# Patient Record
Sex: FEMALE | Race: WHITE | NOT HISPANIC OR LATINO | Employment: PART TIME | URBAN - METROPOLITAN AREA
[De-identification: names, ages, dates, MRNs, and addresses within clinical notes are randomized per-mention and may not be internally consistent; named-entity substitution may affect disease eponyms.]

---

## 2018-01-10 NOTE — RESULT NOTES
Verified Results  (1) LIPID PANEL FASTING W DIRECT LDL REFLEX 06Apr2016 11:10AM Chanda Dre Order Number: [id number]      Triglyceride:         Normal              <150 mg/dl       Borderline High    150-199 mg/dl       High               200-499 mg/dl       Very High          >499 mg/dl  Cholesterol:         Desirable        <200 mg/dl      Borderline High  200-239 mg/dl      High             >239 mg/dl  HDL Cholesterol:        High    >59 mg/dL      Low     <41 mg/dL  LDL Cholesterol:        Optimal          <100 mg/dl         Near Optimal     100-129 mg/dl        Above Optimal          Borderline High   130-159 mg/dl          High              160-189 mg/dl          Very High        >189 mg/dl  LDL CALCULATED:    This screening LDL is a calculated result  It does not have the accuracy of the Direct Measured LDL in the monitoring of patients with hyperlipidemia and/or statin therapy  Direct Measure LDL (BQN550) must be ordered separately in these patients  Test Name Result Flag Reference   CHOLESTEROL 167 mg/dL     LDL CHOLESTEROL CALCULATED 84 mg/dL  0-100   TRIGLYCERIDES 120 mg/dL  <=150   Specimen collection should occur prior to N-Acetylcysteine or Metamizole administration due to the potential for falsely depressed results  HDL,DIRECT 59 mg/dL  40-60     (1) COMPREHENSIVE METABOLIC PANEL [id number] 14:53GC Pradipramesh Erickson Order Number: [id number]      National Kidney Disease Education Program recommendations are as follows:  GFR calculation is accurate only with a steady state creatinine  Chronic Kidney disease less than 60 ml/min/1 73 sq  meters  Kidney failure less than 15 ml/min/1 73 sq  meters  Test Name Result Flag Reference   GLUCOSE,RANDM 86 mg/dL     If the patient is fasting, the ADA then defines impaired fasting glucose as > 100 mg/dL and diabetes as > or equal to 123 mg/dL     SODIUM 141 mmol/L  136-145   POTASSIUM 4 1 mmol/L  3 5-5 3   CHLORIDE 106 mmol/L 100-108   CARBON DIOXIDE 29 mmol/L  21-32   ANION GAP (CALC) 6 mmol/L  4-13   BLOOD UREA NITROGEN 14 mg/dL  5-25   CREATININE 0 74 mg/dL  0 60-1 30   Standardized to IDMS reference method   CALCIUM 10 2 mg/dL H 8 3-10 1   BILI, TOTAL 1 00 mg/dL  0 20-1 00   ALK PHOSPHATAS 92 U/L     ALT (SGPT) 27 U/L  12-78   AST(SGOT) 16 U/L  5-45   ALBUMIN 4 1 g/dL  3 5-5 0   TOTAL PROTEIN 7 5 g/dL  6 4-8 2   eGFR Non-African American      >60 0 ml/min/1 73sq m

## 2018-01-12 NOTE — RESULT NOTES
Message   Pap and HPV negative  Will recheck in 5 years      Verified Results  (1) THIN PREP PAP WITH IMAGING [id number] 12:43PM Dinah Guerrero     Test Name Result Flag Reference   LAB AP CASE REPORT (Report)     Gynecologic Cytology Report            Case: [id number]                  Authorizing Provider: Carol Butler MD     Collected:      05/11/2016 1243        First Screen:     Michelle Walls, CT Received:      05/13/2016 1243        Specimen:  LIQUID-BASED PAP, SCREENING, Cervix   HPV HIGH RISK RESULT (Report)     HPV, High Risk: HPV NEG, HPV16 NEG, HPV18 NEG      Other High Risk HPV Negative, HPV 16 Negative, HPV 18 Negative  HPV types: 16,18,31,33,35,39,45,51,52,56,58,59,66 and 68 DNA are undetectable or below the pre-set threshold  [employer]?s FDA approved Rosalio 4800 is utilized with strict adherence to the ?s instruction  manual to test for the presence of High-Risk HPV DNA, as well as HPV 16 and HPV 18  This instrument  has been validated by our laboratory and/or by the   A negative result does not preclude the presence of HPV infection because results depend on adequate  specimen collection, absence of inhibitors and sufficient DNA to be detected  Additionally, HPV negative  results are not intended to prevent women from proceeding to colposcopy if clinically warranted  Positive HPV test results indicate the presence of any one or more of the high risk types, but since patients  are often co-infected with low-risk types it does not rule out the presence of low-risk types in patients  with mixed infections  LAB AP GYN PRIMARY INTERPRETATION      Negative for intraepithelial lesion or malignancy   LAB AP GYN SPECIMEN ADEQUACY      Satisfactory for evaluation  Endocervical/transformation zone component present     LAB AP GYN ADDITIONAL INFORMATION (Report)     [employer]'s FDA approved ,  and ThinPrep Imaging System are   utilized with strict adherence to the 's instruction manual to   prepare gynecologic and non-gynecologic cytology specimens for the   production of ThinPrep slides as well as for gynecologic ThinPrep imaging  These processes have been validated by our laboratory and/or by the     The Pap test is not a diagnostic procedure and should not be used as the   sole means to detect cervical cancer  It is only a screening procedure to   aid in the detection of cervical cancer and its precursors  Both   false-negative and false-positive results have been experienced  Your   patient's test result should be interpreted in this context together with   the history and clinical findings  LAB AP LMP          Plan  Screening for cervical cancer    · (1) THIN PREP PAP WITH IMAGING ; every 5 years;  Last [id number]; Status:Active

## 2018-02-16 ENCOUNTER — OFFICE VISIT (OUTPATIENT)
Dept: FAMILY MEDICINE CLINIC | Facility: CLINIC | Age: 63
End: 2018-02-16
Payer: COMMERCIAL

## 2018-02-16 VITALS
BODY MASS INDEX: 26.75 KG/M2 | SYSTOLIC BLOOD PRESSURE: 124 MMHG | WEIGHT: 151 LBS | OXYGEN SATURATION: 98 % | HEART RATE: 62 BPM | DIASTOLIC BLOOD PRESSURE: 84 MMHG | HEIGHT: 63 IN | TEMPERATURE: 98.9 F

## 2018-02-16 DIAGNOSIS — F17.200 CURRENT EVERY DAY SMOKER: ICD-10-CM

## 2018-02-16 DIAGNOSIS — Z91.09 ENVIRONMENTAL ALLERGIES: ICD-10-CM

## 2018-02-16 DIAGNOSIS — Z12.31 VISIT FOR SCREENING MAMMOGRAM: ICD-10-CM

## 2018-02-16 DIAGNOSIS — E78.00 HIGH CHOLESTEROL: Primary | ICD-10-CM

## 2018-02-16 PROCEDURE — 99214 OFFICE O/P EST MOD 30 MIN: CPT | Performed by: FAMILY MEDICINE

## 2018-02-16 RX ORDER — FLUTICASONE PROPIONATE 50 MCG
2 SPRAY, SUSPENSION (ML) NASAL DAILY
COMMUNITY
Start: 2015-03-23

## 2018-02-16 RX ORDER — LATANOPROST 50 UG/ML
SOLUTION/ DROPS OPHTHALMIC
COMMUNITY
Start: 2018-01-22 | End: 2018-07-25 | Stop reason: ALTCHOICE

## 2018-02-16 RX ORDER — ATORVASTATIN CALCIUM 20 MG/1
1 TABLET, FILM COATED ORAL DAILY
COMMUNITY
Start: 2014-08-26 | End: 2018-02-16 | Stop reason: SDUPTHER

## 2018-02-16 RX ORDER — ATORVASTATIN CALCIUM 20 MG/1
20 TABLET, FILM COATED ORAL DAILY
Qty: 90 TABLET | Refills: 1 | Status: SHIPPED | OUTPATIENT
Start: 2018-02-16 | End: 2018-09-12 | Stop reason: SDUPTHER

## 2018-02-16 NOTE — PATIENT INSTRUCTIONS

## 2018-02-16 NOTE — PROGRESS NOTES
Assessment/Plan:     Diagnoses and all orders for this visit:    High cholesterol  -     atorvastatin (LIPITOR) 20 mg tablet; Take 1 tablet (20 mg total) by mouth daily  -     Comprehensive metabolic panel; Future  -     Lipid Panel with Direct LDL reflex; Future    Current every day smoker    Visit for screening mammogram  -     Mammo screening bilateral w cad; Future    Environmental allergies    Other orders  -     Discontinue: atorvastatin (LIPITOR) 20 mg tablet; Take 1 tablet by mouth daily  -     fluticasone (FLONASE) 50 mcg/act nasal spray; 2 sprays into each nostril daily  -     latanoprost (XALATAN) 0 005 % ophthalmic solution;       Update labs  Mammogram ordered  Declined colonoscopy  Declined Lung cancer CT screening  Counseled to quit smoking  Discussed options  She is not ready  Follow up yearly, sooner if needed  Subjective:      Patient ID: Verenice Johns is a 63 y o  female  She is here to get her cholesterol checked  She ran out of her Atorvastatin 1 month ago  She has not had labs in almost 2 years  She is a smoker, she states she would like to quit but is under stress -  being treated for carcinoid tumor  She also would like to get a mammogram   Had cologuard 2 years ago per patient  Does not want colonoscopy  Allergies - stable on Flonase  Hyperlipidemia   This is a chronic problem  The current episode started more than 1 year ago  She has no history of diabetes  Pertinent negatives include no chest pain, myalgias or shortness of breath  The following portions of the patient's history were reviewed and updated as appropriate: allergies, current medications, past family history, past medical history, past social history, past surgical history and problem list     Review of Systems   Constitutional: Negative for activity change, appetite change, chills, fatigue, fever and unexpected weight change     HENT: Negative for congestion, ear discharge, ear pain, postnasal drip, sinus pressure and sore throat  Eyes: Negative for discharge and visual disturbance  Respiratory: Negative for cough, shortness of breath and wheezing  Cardiovascular: Negative for chest pain, palpitations and leg swelling  Gastrointestinal: Negative for abdominal pain, constipation, diarrhea, nausea and vomiting  Endocrine: Negative for cold intolerance, heat intolerance, polydipsia and polyuria  Genitourinary: Negative for difficulty urinating and frequency  Musculoskeletal: Negative for arthralgias, back pain, joint swelling and myalgias  Skin: Negative for rash  Neurological: Negative for dizziness, weakness, light-headedness, numbness and headaches  Hematological: Negative for adenopathy  Psychiatric/Behavioral: Negative for behavioral problems, confusion, dysphoric mood, sleep disturbance and suicidal ideas  The patient is not nervous/anxious  Objective:      /84   Pulse 62   Temp 98 9 °F (37 2 °C)   Ht 5' 2 5" (1 588 m)   Wt 68 5 kg (151 lb)   SpO2 98%   BMI 27 18 kg/m²          Physical Exam   Constitutional: She is oriented to person, place, and time  She appears well-developed and well-nourished  No distress  HENT:   Head: Normocephalic and atraumatic  Eyes: Conjunctivae are normal  Pupils are equal, round, and reactive to light  Neck: Neck supple  Cardiovascular: Normal rate, regular rhythm and normal heart sounds  Exam reveals no gallop and no friction rub  No murmur heard  Pulmonary/Chest: Effort normal and breath sounds normal  No respiratory distress  She has no wheezes  She has no rales  She exhibits no tenderness  Musculoskeletal: Normal range of motion  She exhibits no edema  Neurological: She is alert and oriented to person, place, and time  Skin: Skin is warm and dry  No rash noted  She is not diaphoretic  Psychiatric: She has a normal mood and affect   Her behavior is normal  Judgment and thought content normal  Nursing note and vitals reviewed

## 2018-04-19 ENCOUNTER — TELEPHONE (OUTPATIENT)
Dept: FAMILY MEDICINE CLINIC | Facility: CLINIC | Age: 63
End: 2018-04-19

## 2018-04-19 ENCOUNTER — APPOINTMENT (OUTPATIENT)
Dept: LAB | Facility: CLINIC | Age: 63
End: 2018-04-19
Payer: COMMERCIAL

## 2018-04-19 DIAGNOSIS — E83.52 HYPERCALCEMIA: ICD-10-CM

## 2018-04-19 DIAGNOSIS — E78.00 HIGH CHOLESTEROL: ICD-10-CM

## 2018-04-19 DIAGNOSIS — E34.9 ELEVATED CALCITONIN LEVEL: Primary | ICD-10-CM

## 2018-04-19 DIAGNOSIS — E83.52 HYPERCALCEMIA: Primary | ICD-10-CM

## 2018-04-19 DIAGNOSIS — R79.89 ELEVATED PTHRP LEVEL: ICD-10-CM

## 2018-04-19 LAB
ALBUMIN SERPL BCP-MCNC: 3.9 G/DL (ref 3.5–5)
ALP SERPL-CCNC: 94 U/L (ref 46–116)
ALT SERPL W P-5'-P-CCNC: 29 U/L (ref 12–78)
ANION GAP SERPL CALCULATED.3IONS-SCNC: 5 MMOL/L (ref 4–13)
AST SERPL W P-5'-P-CCNC: 19 U/L (ref 5–45)
BILIRUB SERPL-MCNC: 0.65 MG/DL (ref 0.2–1)
BUN SERPL-MCNC: 14 MG/DL (ref 5–25)
CALCIUM ALBUM COR SERPL-MCNC: 10.4 MG/DL (ref 8.3–10.1)
CALCIUM SERPL-MCNC: 10.3 MG/DL (ref 8.3–10.1)
CHLORIDE SERPL-SCNC: 109 MMOL/L (ref 100–108)
CHOLEST SERPL-MCNC: 154 MG/DL (ref 50–200)
CO2 SERPL-SCNC: 29 MMOL/L (ref 21–32)
CREAT SERPL-MCNC: 0.76 MG/DL (ref 0.6–1.3)
GFR SERPL CREATININE-BSD FRML MDRD: 86 ML/MIN/1.73SQ M
GLUCOSE P FAST SERPL-MCNC: 86 MG/DL (ref 65–99)
HDLC SERPL-MCNC: 65 MG/DL (ref 40–60)
LDLC SERPL CALC-MCNC: 71 MG/DL (ref 0–100)
POTASSIUM SERPL-SCNC: 4.4 MMOL/L (ref 3.5–5.3)
PROT SERPL-MCNC: 7.5 G/DL (ref 6.4–8.2)
PTH-INTACT SERPL-MCNC: 110 PG/ML (ref 18.4–80.1)
SODIUM SERPL-SCNC: 143 MMOL/L (ref 136–145)
TRIGL SERPL-MCNC: 90 MG/DL

## 2018-04-19 PROCEDURE — 80061 LIPID PANEL: CPT

## 2018-04-19 PROCEDURE — 83970 ASSAY OF PARATHORMONE: CPT

## 2018-04-19 PROCEDURE — 36415 COLL VENOUS BLD VENIPUNCTURE: CPT

## 2018-04-19 PROCEDURE — 80053 COMPREHEN METABOLIC PANEL: CPT

## 2018-07-25 ENCOUNTER — OFFICE VISIT (OUTPATIENT)
Dept: ENDOCRINOLOGY | Facility: CLINIC | Age: 63
End: 2018-07-25
Payer: COMMERCIAL

## 2018-07-25 VITALS
SYSTOLIC BLOOD PRESSURE: 116 MMHG | HEIGHT: 63 IN | DIASTOLIC BLOOD PRESSURE: 80 MMHG | BODY MASS INDEX: 27.64 KG/M2 | HEART RATE: 66 BPM | WEIGHT: 156 LBS

## 2018-07-25 DIAGNOSIS — E83.52 HYPERCALCEMIA: ICD-10-CM

## 2018-07-25 DIAGNOSIS — Z78.0 POST-MENOPAUSAL: ICD-10-CM

## 2018-07-25 DIAGNOSIS — R79.89 ELEVATED PTHRP LEVEL: ICD-10-CM

## 2018-07-25 DIAGNOSIS — E21.3 HYPERPARATHYROIDISM (HCC): Primary | ICD-10-CM

## 2018-07-25 DIAGNOSIS — F17.200 CURRENT EVERY DAY SMOKER: ICD-10-CM

## 2018-07-25 PROCEDURE — 99244 OFF/OP CNSLTJ NEW/EST MOD 40: CPT | Performed by: INTERNAL MEDICINE

## 2018-07-25 NOTE — ASSESSMENT & PLAN NOTE
Counseled on smoking sensation-discussed increased risk of bone loss, smoking as risk factor for osteopenia/osteoporosis

## 2018-07-25 NOTE — LETTER
July 25, 2018     Oneal Mccall MD  [address]  [address]    Patient: Toi Fitting   YOB: 1955   Date of Visit: 7/25/2018       Dear Dr Дмитрий Peacock: Thank you for referring Carinenelda Schuler to me for evaluation  Below are my notes for this consultation  If you have questions, please do not hesitate to call me  I look forward to following your patient along with you  Sincerely,        Josephine Hoover MD        CC: No Recipients  Josephine Hoover MD  7/25/2018  1:08 PM  Sign at close encounter   Toi Fitting 66 y o  female MRN: [id number]    Encounter: [id number]      Assessment/Plan     Problem List Items Addressed This Visit     Current every day smoker     Counseled on smoking sensation-discussed increased risk of bone loss, smoking as risk factor for osteopenia/osteoporosis         Hyperparathyroidism ([facility] [address] ) - Primary     Appears to be primary hyperparathyroidism as calcium has been mildly elevated since at least 2016-for now I am going to repeat calcium,, PTH, will also check fossae as well as vitamin-D 25 hydroxy and 24 hours urine calcium  I am also going to set her up for a 4 site DEXA scan    Further workup and management will depend on the results of these labs and imaging         Relevant Orders    Vitamin D 25 hydroxy Lab Collect    T4, free Lab Collect    TSH, 3rd generation Lab Collect    Basic metabolic panel Lab Collect    Phosphorus Lab Collect    Albumin Lab Collect    Calcium, urine, 24 hour Lab Collect    Creatinine, urine, 24 hour Lab Collect    DXA bone density spine hip and pelvis    Hypercalcemia     Mildly elevated since at least 2016-labs as ordered         Relevant Orders    Vitamin D 25 hydroxy Lab Collect    T4, free Lab Collect    TSH, 3rd generation Lab Collect    Basic metabolic panel Lab Collect    Phosphorus Lab Collect    Albumin Lab Collect    Calcium, urine, 24 hour Lab Collect    Creatinine, urine, 24 hour Lab Collect    DXA bone density spine hip and pelvis    Post-menopausal    Relevant Orders    DXA bone density spine hip and pelvis      Other Visit Diagnoses     Elevated PTHrP level ([facility] [address] )            CC:   Hypercalcemia/hyperparathyroidism    History of Present Illness     HPI:  71-year-old woman referred here for evaluation of hypercalcemia and hyperparathyroidism  Hypercalcemia -initially noted on labs from 2016  No history of kidney stones , no history of fragility fractures  Was on MVI - stopped a few months back - no on any vitamin D supplementations   No significantly dairy intake   No polyuria , c/o polydipsia , occasional constipation   No FH of hypercalcemia ,      Review of Systems   Constitutional: Positive for fatigue  Negative for unexpected weight change  Eyes: Negative for visual disturbance  Respiratory: Positive for cough and shortness of breath  Cardiovascular: Negative for palpitations and leg swelling  Gastrointestinal: Positive for constipation  Negative for diarrhea, nausea and vomiting  Endocrine: Positive for polydipsia  Negative for polyuria  Musculoskeletal: Positive for arthralgias  Negative for gait problem and myalgias  Skin: Negative for color change, pallor and rash  Neurological: Negative for weakness  Psychiatric/Behavioral: Positive for sleep disturbance  All other systems reviewed and are negative        Historical Information   Past Medical History:   Diagnosis Date    Hyperlipidemia     last assessed - 2016     Past Surgical History:   Procedure Laterality Date     SECTION       Social History   History   Alcohol Use No     History   Drug Use No     History   Smoking Status    Current Every Day Smoker   Smokeless Tobacco    Never Used     Family History:   Family History   Problem Relation Age of Onset    Cancer Mother     Cancer Father     Cancer Sister        Meds/Allergies   Current Outpatient Prescriptions   Medication Sig Dispense Refill    atorvastatin (LIPITOR) 20 mg tablet Take 1 tablet (20 mg total) by mouth daily 90 tablet 1    fluticasone (FLONASE) 50 mcg/act nasal spray 2 sprays into each nostril daily      Travoprost (TRAVATAN OP) Apply to eye       No current facility-administered medications for this visit  Allergies   Allergen Reactions    Sulfa Antibiotics        Objective   Vitals: Blood pressure 116/80, pulse 66, height 5' 3" (1 6 m), weight 70 8 kg (156 lb)  Physical Exam   Constitutional: She is oriented to person, place, and time  She appears well-developed and well-nourished  No distress  HENT:   Head: Normocephalic and atraumatic  Mouth/Throat: No oropharyngeal exudate  Eyes: Conjunctivae and EOM are normal  No scleral icterus  Neck: Normal range of motion  Neck supple  No thyromegaly present  Cardiovascular: Normal rate, regular rhythm and normal heart sounds  No murmur heard  Pulmonary/Chest: Effort normal and breath sounds normal  No respiratory distress  She has no wheezes  She has no rales  Abdominal: Soft  Bowel sounds are normal  She exhibits no distension  There is no tenderness  There is no rebound  Musculoskeletal: Normal range of motion  She exhibits no edema or deformity  Lymphadenopathy:     She has no cervical adenopathy  Neurological: She is alert and oriented to person, place, and time  Skin: Skin is warm and dry  No rash noted  No erythema  Psychiatric: She has a normal mood and affect  Her behavior is normal  Judgment and thought content normal        The history was obtained from the review of the chart, patient      Lab Results:   Lab Results   Component Value Date/Time    Sodium 143 04/19/2018 08:22 AM    Potassium 4 4 04/19/2018 08:22 AM    Chloride 109 (H) 04/19/2018 08:22 AM    CO2 29 04/19/2018 08:22 AM    BUN 14 04/19/2018 08:22 AM    Creatinine 0 76 04/19/2018 08:22 AM    Glucose, Fasting 86 04/19/2018 08:22 AM    Calcium 10 3 (H) 04/19/2018 08:22 AM    Anion Gap 5 04/19/2018 08:22 AM eGFR 86 04/19/2018 08:22 AM     0 (H) 04/19/2018 08:22 AM           Portions of the record may have been created with voice recognition software  Occasional wrong word or "sound a like" substitutions may have occurred due to the inherent limitations of voice recognition software  Read the chart carefully and recognize, using context, where substitutions have occurred

## 2018-07-25 NOTE — ASSESSMENT & PLAN NOTE
Appears to be primary hyperparathyroidism as calcium has been mildly elevated since at least 2016-for now I am going to repeat calcium,, PTH, will also check fossae as well as vitamin-D 25 hydroxy and 24 hours urine calcium  I am also going to set her up for a 4 site DEXA scan    Further workup and management will depend on the results of these labs and imaging

## 2018-07-25 NOTE — PROGRESS NOTES
Pedrito Matson 66 y o  female MRN: [id number]    Encounter: [id number]      Assessment/Plan     Problem List Items Addressed This Visit     Current every day smoker     Counseled on smoking sensation-discussed increased risk of bone loss, smoking as risk factor for osteopenia/osteoporosis         Hyperparathyroidism ([facility] [address] ) - Primary     Appears to be primary hyperparathyroidism as calcium has been mildly elevated since at least 2016-for now I am going to repeat calcium,, PTH, will also check fossae as well as vitamin-D 25 hydroxy and 24 hours urine calcium  I am also going to set her up for a 4 site DEXA scan  Further workup and management will depend on the results of these labs and imaging         Relevant Orders    Vitamin D 25 hydroxy Lab Collect    T4, free Lab Collect    TSH, 3rd generation Lab Collect    Basic metabolic panel Lab Collect    Phosphorus Lab Collect    Albumin Lab Collect    Calcium, urine, 24 hour Lab Collect    Creatinine, urine, 24 hour Lab Collect    DXA bone density spine hip and pelvis    Hypercalcemia     Mildly elevated since at least 2016-labs as ordered         Relevant Orders    Vitamin D 25 hydroxy Lab Collect    T4, free Lab Collect    TSH, 3rd generation Lab Collect    Basic metabolic panel Lab Collect    Phosphorus Lab Collect    Albumin Lab Collect    Calcium, urine, 24 hour Lab Collect    Creatinine, urine, 24 hour Lab Collect    DXA bone density spine hip and pelvis    Post-menopausal    Relevant Orders    DXA bone density spine hip and pelvis      Other Visit Diagnoses     Elevated PTHrP level ([facility])            CC:   Hypercalcemia/hyperparathyroidism    History of Present Illness     HPI:  82-year-old woman referred here for evaluation of hypercalcemia and hyperparathyroidism    Hypercalcemia -initially noted on labs from 2016  No history of kidney stones , no history of fragility fractures  Was on MVI - stopped a few months back - no on any vitamin D supplementations   No significantly dairy intake   No polyuria , c/o polydipsia , occasional constipation   No FH of hypercalcemia ,      Review of Systems   Constitutional: Positive for fatigue  Negative for unexpected weight change  Eyes: Negative for visual disturbance  Respiratory: Positive for cough and shortness of breath  Cardiovascular: Negative for palpitations and leg swelling  Gastrointestinal: Positive for constipation  Negative for diarrhea, nausea and vomiting  Endocrine: Positive for polydipsia  Negative for polyuria  Musculoskeletal: Positive for arthralgias  Negative for gait problem and myalgias  Skin: Negative for color change, pallor and rash  Neurological: Negative for weakness  Psychiatric/Behavioral: Positive for sleep disturbance  All other systems reviewed and are negative  Historical Information   Past Medical History:   Diagnosis Date    Hyperlipidemia     last assessed - 2016     Past Surgical History:   Procedure Laterality Date     SECTION       Social History   History   Alcohol Use No     History   Drug Use No     History   Smoking Status    Current Every Day Smoker   Smokeless Tobacco    Never Used     Family History:   Family History   Problem Relation Age of Onset    Cancer Mother     Cancer Father     Cancer Sister        Meds/Allergies   Current Outpatient Prescriptions   Medication Sig Dispense Refill    atorvastatin (LIPITOR) 20 mg tablet Take 1 tablet (20 mg total) by mouth daily 90 tablet 1    fluticasone (FLONASE) 50 mcg/act nasal spray 2 sprays into each nostril daily      Travoprost (TRAVATAN OP) Apply to eye       No current facility-administered medications for this visit  Allergies   Allergen Reactions    Sulfa Antibiotics        Objective   Vitals: Blood pressure 116/80, pulse 66, height 5' 3" (1 6 m), weight 70 8 kg (156 lb)  Physical Exam   Constitutional: She is oriented to person, place, and time   She appears well-developed and well-nourished  No distress  HENT:   Head: Normocephalic and atraumatic  Mouth/Throat: No oropharyngeal exudate  Eyes: Conjunctivae and EOM are normal  No scleral icterus  Neck: Normal range of motion  Neck supple  No thyromegaly present  Cardiovascular: Normal rate, regular rhythm and normal heart sounds  No murmur heard  Pulmonary/Chest: Effort normal and breath sounds normal  No respiratory distress  She has no wheezes  She has no rales  Abdominal: Soft  Bowel sounds are normal  She exhibits no distension  There is no tenderness  There is no rebound  Musculoskeletal: Normal range of motion  She exhibits no edema or deformity  Lymphadenopathy:     She has no cervical adenopathy  Neurological: She is alert and oriented to person, place, and time  Skin: Skin is warm and dry  No rash noted  No erythema  Psychiatric: She has a normal mood and affect  Her behavior is normal  Judgment and thought content normal        The history was obtained from the review of the chart, patient  Lab Results:   Lab Results   Component Value Date/Time    Sodium 143 04/19/2018 08:22 AM    Potassium 4 4 04/19/2018 08:22 AM    Chloride 109 (H) 04/19/2018 08:22 AM    CO2 29 04/19/2018 08:22 AM    BUN 14 04/19/2018 08:22 AM    Creatinine 0 76 04/19/2018 08:22 AM    Glucose, Fasting 86 04/19/2018 08:22 AM    Calcium 10 3 (H) 04/19/2018 08:22 AM    Anion Gap 5 04/19/2018 08:22 AM    eGFR 86 04/19/2018 08:22 AM     0 (H) 04/19/2018 08:22 AM           Portions of the record may have been created with voice recognition software  Occasional wrong word or "sound a like" substitutions may have occurred due to the inherent limitations of voice recognition software  Read the chart carefully and recognize, using context, where substitutions have occurred

## 2018-09-12 DIAGNOSIS — E78.00 HIGH CHOLESTEROL: ICD-10-CM

## 2018-09-13 RX ORDER — ATORVASTATIN CALCIUM 20 MG/1
TABLET, FILM COATED ORAL
Qty: 90 TABLET | Refills: 1 | Status: SHIPPED | OUTPATIENT
Start: 2018-09-13 | End: 2019-06-25 | Stop reason: SDUPTHER

## 2018-10-01 ENCOUNTER — HOSPITAL ENCOUNTER (OUTPATIENT)
Dept: RADIOLOGY | Facility: MEDICAL CENTER | Age: 63
Discharge: HOME | End: 2018-10-01
Payer: COMMERCIAL

## 2018-10-01 DIAGNOSIS — E83.52 HYPERCALCEMIA: ICD-10-CM

## 2018-10-01 DIAGNOSIS — E21.3 HYPERPARATHYROIDISM (HCC): ICD-10-CM

## 2018-10-01 DIAGNOSIS — Z78.0 POST-MENOPAUSAL: ICD-10-CM

## 2018-10-01 PROCEDURE — 77080 DXA BONE DENSITY AXIAL: CPT

## 2018-10-02 ENCOUNTER — TELEPHONE (OUTPATIENT)
Dept: ENDOCRINOLOGY | Facility: CLINIC | Age: 63
End: 2018-10-02

## 2018-10-12 ENCOUNTER — LAB (OUTPATIENT)
Dept: LAB | Facility: CLINIC | Age: 63
End: 2018-10-12
Payer: COMMERCIAL

## 2018-10-12 DIAGNOSIS — E83.52 HYPERCALCEMIA: ICD-10-CM

## 2018-10-12 DIAGNOSIS — E21.3 HYPERPARATHYROIDISM (HCC): ICD-10-CM

## 2018-10-12 LAB
25(OH)D3 SERPL-MCNC: 28 NG/ML (ref 30–100)
ALBUMIN SERPL BCP-MCNC: 3.7 G/DL (ref 3.5–5)
ANION GAP SERPL CALCULATED.3IONS-SCNC: 4 MMOL/L (ref 4–13)
BUN SERPL-MCNC: 16 MG/DL (ref 5–25)
CALCIUM SERPL-MCNC: 9.7 MG/DL (ref 8.3–10.1)
CHLORIDE SERPL-SCNC: 107 MMOL/L (ref 100–108)
CO2 SERPL-SCNC: 30 MMOL/L (ref 21–32)
CREAT SERPL-MCNC: 0.73 MG/DL (ref 0.6–1.3)
GFR SERPL CREATININE-BSD FRML MDRD: 89 ML/MIN/1.73SQ M
GLUCOSE P FAST SERPL-MCNC: 69 MG/DL (ref 65–99)
PHOSPHATE SERPL-MCNC: 2.9 MG/DL (ref 2.3–4.1)
POTASSIUM SERPL-SCNC: 4.2 MMOL/L (ref 3.5–5.3)
SODIUM SERPL-SCNC: 141 MMOL/L (ref 136–145)
T4 FREE SERPL-MCNC: 1.02 NG/DL (ref 0.76–1.46)
TSH SERPL DL<=0.05 MIU/L-ACNC: 0.64 UIU/ML (ref 0.36–3.74)

## 2018-10-12 PROCEDURE — 84100 ASSAY OF PHOSPHORUS: CPT

## 2018-10-12 PROCEDURE — 83970 ASSAY OF PARATHORMONE: CPT

## 2018-10-12 PROCEDURE — 82040 ASSAY OF SERUM ALBUMIN: CPT

## 2018-10-12 PROCEDURE — 36415 COLL VENOUS BLD VENIPUNCTURE: CPT

## 2018-10-12 PROCEDURE — 84443 ASSAY THYROID STIM HORMONE: CPT

## 2018-10-12 PROCEDURE — 80048 BASIC METABOLIC PNL TOTAL CA: CPT

## 2018-10-12 PROCEDURE — 84439 ASSAY OF FREE THYROXINE: CPT

## 2018-10-12 PROCEDURE — 82306 VITAMIN D 25 HYDROXY: CPT

## 2018-10-15 ENCOUNTER — TELEPHONE (OUTPATIENT)
Dept: ENDOCRINOLOGY | Facility: CLINIC | Age: 63
End: 2018-10-15

## 2018-10-15 DIAGNOSIS — E21.3 HYPERPARATHYROIDISM (HCC): Primary | ICD-10-CM

## 2018-10-15 LAB — PTH-INTACT SERPL-MCNC: 73.8 PG/ML (ref 18.4–80.1)

## 2018-10-15 NOTE — TELEPHONE ENCOUNTER
----- Message from Blanca Villasenor MD sent at 10/14/2018  1:31 PM EDT -----  Please check with lab if they can add PTH to the labs - will discuss the labs on upcoming visit

## 2018-10-19 ENCOUNTER — APPOINTMENT (OUTPATIENT)
Dept: LAB | Facility: CLINIC | Age: 63
End: 2018-10-19
Payer: COMMERCIAL

## 2018-10-19 DIAGNOSIS — E83.52 HYPERCALCEMIA: ICD-10-CM

## 2018-10-19 DIAGNOSIS — E21.3 HYPERPARATHYROIDISM (HCC): ICD-10-CM

## 2018-10-19 PROCEDURE — 82340 ASSAY OF CALCIUM IN URINE: CPT

## 2018-10-19 PROCEDURE — 82570 ASSAY OF URINE CREATININE: CPT

## 2018-10-20 LAB
CALCIUM 24H UR-MCNC: 144.5 MG/24 HRS (ref 42–353)
CREAT 24H UR-MRATE: 1 G/24HR (ref 0.6–1.8)
SPECIMEN VOL UR: 1700 ML
SPECIMEN VOL UR: 1700 ML

## 2018-11-09 ENCOUNTER — OFFICE VISIT (OUTPATIENT)
Dept: ENDOCRINOLOGY | Facility: CLINIC | Age: 63
End: 2018-11-09
Payer: COMMERCIAL

## 2018-11-09 VITALS
WEIGHT: 159.4 LBS | HEART RATE: 57 BPM | DIASTOLIC BLOOD PRESSURE: 80 MMHG | SYSTOLIC BLOOD PRESSURE: 130 MMHG | BODY MASS INDEX: 28.24 KG/M2

## 2018-11-09 DIAGNOSIS — E83.52 HYPERCALCEMIA: ICD-10-CM

## 2018-11-09 DIAGNOSIS — M85.88 OSTEOPENIA OF SPINE: ICD-10-CM

## 2018-11-09 DIAGNOSIS — E21.3 HYPERPARATHYROIDISM (HCC): Primary | ICD-10-CM

## 2018-11-09 PROBLEM — M85.80 OSTEOPENIA: Status: ACTIVE | Noted: 2018-11-09

## 2018-11-09 PROCEDURE — 99213 OFFICE O/P EST LOW 20 MIN: CPT | Performed by: NURSE PRACTITIONER

## 2018-11-09 RX ORDER — TIMOLOL MALEATE 5 MG/ML
SOLUTION/ DROPS OPHTHALMIC
COMMUNITY
Start: 2018-10-25

## 2018-11-09 NOTE — PATIENT INSTRUCTIONS
Vitamin D3 2,000 unit daily         Osteopenia   WHAT YOU NEED TO KNOW:   What is osteopenia? Osteopenia is a condition that causes bone loss and low bone mineral density (BMD)  Low BMD can weaken your bones and increase your risk for fractures  Osteopenia does not cause signs or symptoms  You may not know you have it until you break a bone  Osteopenia must be managed or treated so it does not worsen and become osteoporosis  Osteoporosis is a serious condition that causes bones to become weak, brittle, and easily fractured  What increases my risk for osteopenia? Your risk increases as you age and lose bone  The following may also increase your risk:  · Lack of weight-bearing exercise, or being bedridden    · Loss of testosterone (in men), or loss of estrogen, such as from menopause (in women)    · Family history of osteopenia or osteoporosis    · Alcohol abuse or smoking cigarettes    · Long-term use of a steroid medicine or an anticonvulsant medicine    · An eating disorder, such as anorexia    · Not enough calcium and vitamin D  How is osteopenia diagnosed and treated? · A bone scan  may be used to measure your bone density (thickness) and bone mass (amount)  You may need a bone scan if you are older than 65 years or you are in menopause  A bone scan may be used if you are younger than 72 years and at increased risk for fractures  A dual-energy x-ray absorptiometry (DXA) is a type of bone scan that measures the mineral content in your spine, hip, or forearm  DXA will give a number, called a T-score, based on how much bone mineral you have  The T-score shows your risk for fracture  · Treatment  depends on your risk for fracture  If your risk is low, you may only need to make exercise, nutrition, and other lifestyle changes to manage osteopenia  The changes can help prevent more bone mineral loss and reduce your risk for fractures   If your risk is high, you may be given medicines to help strengthen your bones, prevent bone breakdown, or increase bone formation  What can I do to manage or prevent osteopenia? · Exercise as directed  Do weight-bearing exercises, such as brisk walking, dancing, or yoga  Weight-bearing exercises help build or maintain bone  Exercises such as swimming and bike riding are non-weight-bearing and will not build or maintain bone  Weightlifting also helps strengthen bones and build muscle  Extra muscle can help protect your bones  Your healthcare provider may recommend weightlifting 3 times per week as part of your exercise routine  · Increase calcium and vitamin D as directed  Calcium and vitamin D work together to help build bone  The body deposits calcium into the bones until about age 27  You will then need to get enough calcium and vitamin D to maintain what your bones are storing  Your healthcare provider may tell you to eat more dairy products, such as milk and cheese, for calcium  Spinach, salmon, and dried beans are also good sources of calcium  Cereal, bread, and orange juice may be fortified with vitamin D  You also get vitamin D from exposure to sunlight  Your healthcare provider may also suggest a calcium or vitamin D supplement  Do not take supplements unless directed  · Limit or do not drink alcohol as directed  Alcohol can take calcium from your bones and increase your risk for fractures  Ask your healthcare provider if it is safe for you to drink alcohol  Women should limit alcohol to 1 drink per day  Men should limit alcohol to 2 drinks per day  A drink of alcohol is 12 ounces of beer, 5 ounces of wine, or 1½ ounces of liquor  · Do not smoke  Nicotine can damage blood vessels and make it more difficult to manage your osteopenia  Smoking also affects bone density and increases your risk for bone fractures  Do not use e-cigarettes or smokeless tobacco in place of cigarettes or to help you quit  They still contain nicotine   Ask your healthcare provider for information if you currently smoke and need help quitting  Ask your healthcare provider for information if you need help quitting  · Prevent falls  Decrease your risk for falling by removing items from the floor and removing loose carpets  Turn the lights on where you will be walking  CARE AGREEMENT:   You have the right to help plan your care  Learn about your health condition and how it may be treated  Discuss treatment options with your caregivers to decide what care you want to receive  You always have the right to refuse treatment  The above information is an  only  It is not intended as medical advice for individual conditions or treatments  Talk to your doctor, nurse or pharmacist before following any medical regimen to see if it is safe and effective for you  © 2017 [facility] Information is for End User's use only and may not be sold, redistributed or otherwise used for commercial purposes  All illustrations and images included in CareNotes® are the copyrighted property of [employer]  or [employer]

## 2018-11-09 NOTE — PROGRESS NOTES
Established Patient Progress Note       Chief Complaint   Patient presents with    Hyperparathyroidism        History of Present Illness:     Danielle Gonzales is a 66 y o  female with a history of hyperparathyroidism, hypercalcemia, and osteopenia  For the hyperparathyroidism she recently had further workup for primary hyperparathyroidism and hypercalcemia  Her most recent PTH has normalized to 73 8 and correct calcium has improved to 9 94  Phosphorus was normal and slightly low vitamin d at 28  Her dexa scan showed osteopenia in the left hip and lumbar spine  She denies history of fragility fracture, nephrolithiasis, n/v, constipation, or peptic ulcer  She is not currently vitamin and obtains calcium through her diet  Patient Active Problem List   Diagnosis    High cholesterol    Current every day smoker    Environmental allergies    Visit for screening mammogram    [facility]par[facility]roidism ([facility] [address] )    Hypercalcemia    Post-menopausal    Osteopenia      Past Medical History:   Diagnosis Date    Hyperlipidemia     last assessed - 2016      Past Surgical History:   Procedure Laterality Date     SECTION        Family History   Problem Relation Age of Onset    Cancer Mother     Cancer Father     Cancer Sister      Social History   Substance Use Topics    Smoking status: Current Every Day Smoker    Smokeless tobacco: Never Used    Alcohol use No     Allergies   Allergen Reactions    Sulfa Antibiotics        Current Outpatient Prescriptions:     atorvastatin (LIPITOR) 20 mg tablet, TAKE ONE TABLET BY MOUTH EVERY DAY, Disp: 90 tablet, Rfl: 1    fluticasone (FLONASE) 50 mcg/act nasal spray, 2 sprays into each nostril daily, Disp: , Rfl:     timolol (TIMOPTIC) 0 5 % ophthalmic solution, , Disp: , Rfl:     Review of Systems   Constitutional: Negative for chills and fever  HENT: Negative for sore throat and trouble swallowing  Eyes: Negative for visual disturbance     Respiratory: Negative for shortness of breath  Cardiovascular: Negative for chest pain and palpitations  Gastrointestinal: Negative for abdominal pain, constipation and diarrhea  Endocrine: Negative for cold intolerance, heat intolerance, polydipsia, polyphagia and polyuria  Genitourinary: Negative for frequency  Musculoskeletal: Negative for arthralgias and myalgias  Skin: Negative for rash  Neurological: Negative for dizziness and tremors  Hematological: Negative for adenopathy  Psychiatric/Behavioral: Negative for sleep disturbance  All other systems reviewed and are negative  Physical Exam:  Body mass index is 28 24 kg/m²  /80   Pulse 57   Wt 72 3 kg (159 lb 6 4 oz)   BMI 28 24 kg/m²    Wt Readings from Last 3 Encounters:   11/09/18 72 3 kg (159 lb 6 4 oz)   07/25/18 70 8 kg (156 lb)   02/16/18 68 5 kg (151 lb)       Physical Exam   Constitutional: She is oriented to person, place, and time  She appears well-developed and well-nourished  No distress  HENT:   Head: Normocephalic and atraumatic  Eyes: Pupils are equal, round, and reactive to light  Conjunctivae are normal    Neck: Normal range of motion  Neck supple  No thyromegaly present  Cardiovascular: Normal rate, regular rhythm and normal heart sounds  Pulmonary/Chest: Effort normal and breath sounds normal  No respiratory distress  She has no wheezes  She has no rales  Abdominal: Soft  Bowel sounds are normal  She exhibits no distension  There is no tenderness  Musculoskeletal: Normal range of motion  She exhibits no edema  Neurological: She is alert and oriented to person, place, and time  Skin: Skin is warm and dry  Psychiatric: She has a normal mood and affect  Vitals reviewed        Labs:     Component      Latest Ref Rng & Units 10/19/2018   24 HR URINE VOLUME      mL 1,700   CALCIUM 24 HOUR URINE      42 - 353 mg/24 hrs 144 5   CREATININE 24 HOUR UR      0 6 - 1 8 g/24Hr 1 0   TOTAL URINE VOLUME      ml 1,700 Component      Latest Ref Rng & Units 4/19/2018 10/12/2018   Sodium      136 - 145 mmol/L 143 141   Potassium      3 5 - 5 3 mmol/L 4 4 4 2   Chloride      100 - 108 mmol/L 109 (H) 107   CO2      21 - 32 mmol/L 29 30   Anion Gap      4 - 13 mmol/L 5 4   BUN      5 - 25 mg/dL 14 16   Creatinine      0 60 - 1 30 mg/dL 0 76 0 73   GLUCOSE FASTING      65 - 99 mg/dL 86 69   Calcium      8 3 - 10 1 mg/dL 10 3 (H) 9 7   CORRECTED CALCIUM      8 3 - 10 1 mg/dL 10 4 (H)    AST      5 - 45 U/L 19    ALT      12 - 78 U/L 29    Alkaline Phosphatase      46 - 116 U/L 94    Total Protein      6 4 - 8 2 g/dL 7 5    Albumin      3 5 - 5 0 g/dL 3 9 3 7   TOTAL BILIRUBIN      0 20 - 1 00 mg/dL 0 65    eGFR      ml/min/1 73sq m 86 89   PARATHYROID HORMONE      18 4 - 80 1 pg/mL 110 0 (H) 73 8   Vit D, 25-Hydroxy      30 0 - 100 0 ng/mL  28 0 (L)   Phosphorus      2 3 - 4 1 mg/dL  2 9       CENTRAL  DXA SCAN     CLINICAL HISTORY:   66year old post-menopausal  female risk factors include previous smoking  Hyperparathyroidism  Osteoporosis screening      TECHNIQUE: Bone densitometry was performed using a Horizon A bone densitometer  Regions of interest appear properly placed  There are no obvious fractures or other confounding variables which could limit the study  Degenerative changes of the lumbar   spine and hip  This will falsely elevate the bone mineral densities in these regions       COMPARISON:  None      RESULTS:   LUMBAR SPINE:  L1-L4:  BMD 0 806 gm/cm2  T-score -2 2  Z-score -0 7     LUMBAR SPINE L1, L2 and L4 (average) :   BMD 0 768 gm/sq-cm  T-score is -2 4   Z-score is -0 9            LEFT TOTAL HIP:  BMD 0 897 gm/cm2  T-score -0 4  Z-score 0 7     LEFT FEMORAL NECK:  BMD 0 742 gm/cm2  T-score -1 0  Z-score 0 4     LEFT FOREARM :  BMD 0 652 gm/sq-cm,  T-score is  -0 7  Z-score is  0 7          IMPRESSION:  1    Based on the [facility] classification, the T-score of -2 4 in the lumbar spine is consistent with low bone mineral density  2   The 10 year risk of hip fracture is 1 % with the 10 year risk of major osteoporotic fracture being 7  % as calculated by the WHO fracture risk assessment tool (FRAX)  The current NOF guidelines recommend treating patients with FRAX 10 year risk   score of >3% for hip fracture and >20% for major osteoporotic fracture  3   Any secondary causes of low bone mineral density should be excluded prior to treatment, if clinically indicated  4   A daily intake of at least 1200 mg calcium and 800 - 1000 IU of Vitamin D, as well as weight bearing and muscle strengthening exercise, fall prevention and avoidance of tobacco and excessive alcohol intake as basic preventive measures are suggested  Impression & Plan:    Problem List Items Addressed This Visit     Hyperparathyroidism ([facility] [address] ) - Primary     PTH, calcium, and phosphorus normal  Vitamin d slightly low  Will continue to monitor for now  Relevant Orders    PTH, intact Lab Collect Lab Collect    Vitamin D 25 hydroxy Lab Collect    Phosphorus Lab Collect    Calcium Lab Collect    Albumin Lab Collect    Hypercalcemia     Calcium normal, will continue to monitor for now         Relevant Orders    Calcium Lab Collect    Albumin Lab Collect    Osteopenia     DEXA scan showed osteopenia of left hip and lumbar spine  Vitamin d low  Start vitamin d3 2,000 units daily  Repeat dexa scan in 2 years and vitamin d in 6 months  Educated on falls prevention and importance of weight bearing exercise                  Orders Placed This Encounter   Procedures    PTH, intact Lab Collect Lab Collect     Standing Status:   Future     Standing Expiration Date:   11/9/2019    Vitamin D 25 hydroxy Lab Collect     Standing Status:   Future     Standing Expiration Date:   11/9/2019    Phosphorus Lab Collect     Standing Status:   Future     Standing Expiration Date:   11/9/2019    Calcium Lab Collect     Standing Status:   Future     Standing Expiration Date:   11/9/2019    Albumin Lab Collect     Standing Status:   Future     Standing Expiration Date:   11/9/2019       Patient Instructions   Vitamin D3 2,000 unit daily         Osteopenia   WHAT YOU NEED TO KNOW:   What is osteopenia? Osteopenia is a condition that causes bone loss and low bone mineral density (BMD)  Low BMD can weaken your bones and increase your risk for fractures  Osteopenia does not cause signs or symptoms  You may not know you have it until you break a bone  Osteopenia must be managed or treated so it does not worsen and become osteoporosis  Osteoporosis is a serious condition that causes bones to become weak, brittle, and easily fractured  What increases my risk for osteopenia? Your risk increases as you age and lose bone  The following may also increase your risk:  · Lack of weight-bearing exercise, or being bedridden    · Loss of testosterone (in men), or loss of estrogen, such as from menopause (in women)    · Family history of osteopenia or osteoporosis    · Alcohol abuse or smoking cigarettes    · Long-term use of a steroid medicine or an anticonvulsant medicine    · An eating disorder, such as anorexia    · Not enough calcium and vitamin D  How is osteopenia diagnosed and treated? · A bone scan  may be used to measure your bone density (thickness) and bone mass (amount)  You may need a bone scan if you are older than 65 years or you are in menopause  A bone scan may be used if you are younger than 72 years and at increased risk for fractures  A dual-energy x-ray absorptiometry (DXA) is a type of bone scan that measures the mineral content in your spine, hip, or forearm  DXA will give a number, called a T-score, based on how much bone mineral you have  The T-score shows your risk for fracture  · Treatment  depends on your risk for fracture  If your risk is low, you may only need to make exercise, nutrition, and other lifestyle changes to manage osteopenia   The changes can help prevent more bone mineral loss and reduce your risk for fractures  If your risk is high, you may be given medicines to help strengthen your bones, prevent bone breakdown, or increase bone formation  What can I do to manage or prevent osteopenia? · Exercise as directed  Do weight-bearing exercises, such as brisk walking, dancing, or yoga  Weight-bearing exercises help build or maintain bone  Exercises such as swimming and bike riding are non-weight-bearing and will not build or maintain bone  Weightlifting also helps strengthen bones and build muscle  Extra muscle can help protect your bones  Your healthcare provider may recommend weightlifting 3 times per week as part of your exercise routine  · Increase calcium and vitamin D as directed  Calcium and vitamin D work together to help build bone  The body deposits calcium into the bones until about age 27  You will then need to get enough calcium and vitamin D to maintain what your bones are storing  Your healthcare provider may tell you to eat more dairy products, such as milk and cheese, for calcium  Spinach, salmon, and dried beans are also good sources of calcium  Cereal, bread, and orange juice may be fortified with vitamin D  You also get vitamin D from exposure to sunlight  Your healthcare provider may also suggest a calcium or vitamin D supplement  Do not take supplements unless directed  · Limit or do not drink alcohol as directed  Alcohol can take calcium from your bones and increase your risk for fractures  Ask your healthcare provider if it is safe for you to drink alcohol  Women should limit alcohol to 1 drink per day  Men should limit alcohol to 2 drinks per day  A drink of alcohol is 12 ounces of beer, 5 ounces of wine, or 1½ ounces of liquor  · Do not smoke  Nicotine can damage blood vessels and make it more difficult to manage your osteopenia  Smoking also affects bone density and increases your risk for bone fractures   Do not use e-cigarettes or smokeless tobacco in place of cigarettes or to help you quit  They still contain nicotine  Ask your healthcare provider for information if you currently smoke and need help quitting  Ask your healthcare provider for information if you need help quitting  · Prevent falls  Decrease your risk for falling by removing items from the floor and removing loose carpets  Turn the lights on where you will be walking  CARE AGREEMENT:   You have the right to help plan your care  Learn about your health condition and how it may be treated  Discuss treatment options with your caregivers to decide what care you want to receive  You always have the right to refuse treatment  The above information is an  only  It is not intended as medical advice for individual conditions or treatments  Talk to your doctor, nurse or pharmacist before following any medical regimen to see if it is safe and effective for you  © 2017 [address] Information is for End User's use only and may not be sold, redistributed or otherwise used for commercial purposes  All illustrations and images included in CareNotes® are the copyrighted property of A [employer] A M [employer]  or Pavan Arboleda  Discussed with the patient and all questioned fully answered  She will call me if any problems arise  Follow-up appointment in 6 months       Counseled patient on diagnostic results, prognosis, risk and benefit of treatment options, instruction for management, importance of treatment compliance, Risk  factor reduction and impressions      Nisreen Randhawa [address]

## 2018-11-12 NOTE — ASSESSMENT & PLAN NOTE
DEXA scan showed osteopenia of left hip and lumbar spine  Vitamin d low  Start vitamin d3 2,000 units daily  Repeat dexa scan in 2 years and vitamin d in 6 months  Educated on falls prevention and importance of weight bearing exercise

## 2019-03-21 ENCOUNTER — OFFICE VISIT (OUTPATIENT)
Dept: FAMILY MEDICINE CLINIC | Facility: CLINIC | Age: 64
End: 2019-03-21
Payer: COMMERCIAL

## 2019-03-21 VITALS
HEIGHT: 63 IN | OXYGEN SATURATION: 97 % | SYSTOLIC BLOOD PRESSURE: 120 MMHG | TEMPERATURE: 97.9 F | DIASTOLIC BLOOD PRESSURE: 76 MMHG | WEIGHT: 158 LBS | HEART RATE: 73 BPM | BODY MASS INDEX: 28 KG/M2

## 2019-03-21 DIAGNOSIS — J01.00 ACUTE NON-RECURRENT MAXILLARY SINUSITIS: Primary | ICD-10-CM

## 2019-03-21 PROCEDURE — 3008F BODY MASS INDEX DOCD: CPT | Performed by: FAMILY MEDICINE

## 2019-03-21 PROCEDURE — 99213 OFFICE O/P EST LOW 20 MIN: CPT | Performed by: FAMILY MEDICINE

## 2019-03-21 RX ORDER — BROMPHENIRAMINE MALEATE, PSEUDOEPHEDRINE HYDROCHLORIDE, AND DEXTROMETHORPHAN HYDROBROMIDE 2; 30; 10 MG/5ML; MG/5ML; MG/5ML
5 SYRUP ORAL 4 TIMES DAILY PRN
Qty: 120 ML | Refills: 0 | Status: SHIPPED | OUTPATIENT
Start: 2019-03-21 | End: 2020-02-20 | Stop reason: ALTCHOICE

## 2019-03-21 RX ORDER — AZITHROMYCIN 250 MG/1
TABLET, FILM COATED ORAL
Qty: 6 TABLET | Refills: 0 | Status: SHIPPED | OUTPATIENT
Start: 2019-03-21 | End: 2019-03-25

## 2019-03-21 NOTE — PROGRESS NOTES
Irasema Tiwari 1955 female MRN: [id number]    Acute Visit        ASSESSMENT/PLAN  Diagnoses and all orders for this visit:    Acute non-recurrent maxillary sinusitis  -     azithromycin (ZITHROMAX) 250 mg tablet; Take 2 tablets today then 1 tablet daily x 4 days  -     brompheniramine-pseudoephedrine-DM 30-2-10 MG/5ML syrup; Take 5 mL by mouth 4 (four) times a day as needed for congestion or cough              No future appointments  SUBJECTIVE  CC: URI (Patient seen in office today for cold symptoms - started a few days ok but last night it "hit patient hard" per patient  Symptoms inculde); Cough; Earache; Nasal Drainage; and Generalized Body Aches       Cough, congestion, sore throat x 1 week  Taking Mucinex and Flonase with minimal improvement  No fevers  Irasema Tiwari is a 66 y o  female who presented for an acute visit complaining of  Review of Systems   Constitutional: Negative for activity change, appetite change, chills and fever  HENT: Positive for congestion, postnasal drip, sinus pressure, sinus pain and sore throat  Eyes: Negative  Respiratory: Positive for cough  Negative for shortness of breath and wheezing          Historical Information   The patient history was reviewed as follows:  Past Medical History:   Diagnosis Date    Hyperlipidemia     last assessed - 2016     Past Surgical History:   Procedure Laterality Date     SECTION       Family History   Problem Relation Age of Onset    Cancer Mother     Cancer Father     Cancer Sister       Social History   Social History     Substance and Sexual Activity   Alcohol Use No     Social History     Substance and Sexual Activity   Drug Use No     Social History     Tobacco Use   Smoking Status Current Every Day Smoker   Smokeless Tobacco Never Used       Medications:   Meds/Allergies   Current Outpatient Medications   Medication Sig Dispense Refill    atorvastatin (LIPITOR) 20 mg tablet TAKE ONE TABLET BY MOUTH EVERY DAY 90 tablet 1    fluticasone (FLONASE) 50 mcg/act nasal spray 2 sprays into each nostril daily      timolol (TIMOPTIC) 0 5 % ophthalmic solution       azithromycin (ZITHROMAX) 250 mg tablet Take 2 tablets today then 1 tablet daily x 4 days 6 tablet 0    brompheniramine-pseudoephedrine-DM 30-2-10 MG/5ML syrup Take 5 mL by mouth 4 (four) times a day as needed for congestion or cough 120 mL 0     No current facility-administered medications for this visit  Allergies   Allergen Reactions    Sulfa Antibiotics        OBJECTIVE  Vitals:   Vitals:    03/21/19 1456   BP: 120/76   Pulse: 73   Temp: 97 9 °F (36 6 °C)   SpO2: 97%   Weight: 71 7 kg (158 lb)   Height: 5' 3" (1 6 m)       Invasive Devices          None          Physical Exam   Constitutional: She is oriented to person, place, and time  She appears well-developed and well-nourished  No distress  HENT:   Right Ear: Hearing, tympanic membrane, external ear and ear canal normal    Left Ear: Hearing, tympanic membrane, external ear and ear canal normal    Nose: Mucosal edema present  Right sinus exhibits maxillary sinus tenderness  Left sinus exhibits maxillary sinus tenderness  Mouth/Throat: Uvula is midline  No oropharyngeal exudate or posterior oropharyngeal erythema  PNgtt   Eyes: Pupils are equal, round, and reactive to light  Conjunctivae are normal    Cardiovascular: Normal rate and normal heart sounds  Exam reveals no gallop and no friction rub  No murmur heard  Pulmonary/Chest: Effort normal and breath sounds normal  No respiratory distress  She has no wheezes  She has no rales  Lymphadenopathy:     She has no cervical adenopathy  Neurological: She is alert and oriented to person, place, and time  Skin: Skin is warm  No rash noted  Psychiatric: She has a normal mood and affect  Her behavior is normal  Judgment and thought content normal    Nursing note and vitals reviewed         Lab:  I have personally reviewed all pertinent results

## 2019-06-25 DIAGNOSIS — E78.00 HIGH CHOLESTEROL: ICD-10-CM

## 2019-06-25 RX ORDER — ATORVASTATIN CALCIUM 20 MG/1
TABLET, FILM COATED ORAL
Qty: 90 TABLET | Refills: 1 | Status: SHIPPED | OUTPATIENT
Start: 2019-06-25 | End: 2020-02-10

## 2019-07-22 ENCOUNTER — OFFICE VISIT (OUTPATIENT)
Dept: FAMILY MEDICINE CLINIC | Facility: CLINIC | Age: 64
End: 2019-07-22
Payer: COMMERCIAL

## 2019-07-22 VITALS
HEIGHT: 63 IN | HEART RATE: 82 BPM | TEMPERATURE: 98.6 F | WEIGHT: 158.6 LBS | BODY MASS INDEX: 28.1 KG/M2 | RESPIRATION RATE: 18 BRPM | OXYGEN SATURATION: 97 % | SYSTOLIC BLOOD PRESSURE: 116 MMHG | DIASTOLIC BLOOD PRESSURE: 74 MMHG

## 2019-07-22 DIAGNOSIS — H02.843 SWELLING OF RIGHT EYELID: Primary | ICD-10-CM

## 2019-07-22 PROBLEM — Z12.31 VISIT FOR SCREENING MAMMOGRAM: Status: RESOLVED | Noted: 2018-02-16 | Resolved: 2019-07-22

## 2019-07-22 PROBLEM — J01.00 ACUTE NON-RECURRENT MAXILLARY SINUSITIS: Status: RESOLVED | Noted: 2019-03-21 | Resolved: 2019-07-22

## 2019-07-22 PROCEDURE — 99213 OFFICE O/P EST LOW 20 MIN: CPT | Performed by: NURSE PRACTITIONER

## 2019-07-22 PROCEDURE — 3008F BODY MASS INDEX DOCD: CPT | Performed by: NURSE PRACTITIONER

## 2019-07-22 RX ORDER — METHYLPREDNISOLONE 4 MG/1
TABLET ORAL
Qty: 21 EACH | Refills: 0 | Status: SHIPPED | OUTPATIENT
Start: 2019-07-22 | End: 2020-02-10 | Stop reason: ALTCHOICE

## 2019-07-22 RX ORDER — CEPHALEXIN 500 MG/1
500 CAPSULE ORAL EVERY 8 HOURS SCHEDULED
Qty: 15 CAPSULE | Refills: 0 | Status: SHIPPED | OUTPATIENT
Start: 2019-07-22 | End: 2019-07-27

## 2019-07-22 NOTE — PROGRESS NOTES
Assessment/Plan:    No problem-specific Assessment & Plan notes found for this encounter  Diagnoses and all orders for this visit:    Swelling of right eyelid  Comments: Will give medrol dose pack and also cover for cellulitis with keflex x 5 days   Orders:  -     cephalexin (KEFLEX) 500 mg capsule; Take 1 capsule (500 mg total) by mouth every 8 (eight) hours for 5 days  -     methylPREDNISolone 4 MG tablet therapy pack; Use as directed on package    Other orders  -     Cholecalciferol (EQL VITAMIN D3) 2000 units CAPS; Take by mouth daily          Subjective:      Patient ID: Jay Haines is a 60 y o  female  Pt here today with complaints of R eye pain and swelling and redness  This started Saturday and has become worse  Her eyelid is swollen, worse on the upper eyelid  She was working in her garden and not sure if she was working around [employer] [employer]  It is not her eyeball, it is her eyelid  No drainage, no foreign body sensation, no vision changes, no pain with eye movements  She states the eyelid is itchy and painful  The following portions of the patient's history were reviewed and updated as appropriate:   She  has a past medical history of Hyperlipidemia  She   Patient Active Problem List    Diagnosis Date Noted    Swelling of right eyelid 2019    Osteopenia 2018    Hyperparathyroidism ([facility] [address] ) 2018    Hypercalcemia 2018    Post-menopausal 2018    Current every day smoker 2018    Environmental allergies 2018    High cholesterol 2014     She  has a past surgical history that includes  section  Her family history includes Cancer in her father, mother, and sister  She  reports that she has been smoking  She has never used smokeless tobacco  She reports that she does not drink alcohol or use drugs    Current Outpatient Medications on File Prior to Visit   Medication Sig    atorvastatin (LIPITOR) 20 mg tablet TAKE ONE TABLET BY MOUTH EVERY DAY    Cholecalciferol (EQL VITAMIN D3) 2000 units CAPS Take by mouth daily    fluticasone (FLONASE) 50 mcg/act nasal spray 2 sprays into each nostril daily    timolol (TIMOPTIC) 0 5 % ophthalmic solution     brompheniramine-pseudoephedrine-DM 30-2-10 MG/5ML syrup Take 5 mL by mouth 4 (four) times a day as needed for congestion or cough (Patient not taking: Reported on 7/22/2019)     No current facility-administered medications on file prior to visit  She is allergic to sulfa antibiotics       Review of Systems   Constitutional: Negative for activity change, appetite change, chills, diaphoresis, fatigue, fever and unexpected weight change  HENT: Negative for congestion, ear pain, hearing loss, postnasal drip, sinus pressure, sinus pain, sneezing and sore throat  Eyes: Positive for pain  Negative for redness and visual disturbance  Respiratory: Negative for cough and shortness of breath  Cardiovascular: Negative for chest pain and leg swelling  Gastrointestinal: Negative for abdominal pain, diarrhea, nausea and vomiting  Musculoskeletal: Negative for arthralgias  Neurological: Negative for dizziness and light-headedness  Psychiatric/Behavioral: Negative for behavioral problems and dysphoric mood  Objective:      /74 (BP Location: Left arm, Patient Position: Sitting, Cuff Size: Adult)   Pulse 82   Temp 98 6 °F (37 °C) (Tympanic)   Resp 18   Ht 5' 3" (1 6 m)   Wt 71 9 kg (158 lb 9 6 oz)   SpO2 97%   BMI 28 09 kg/m²          Physical Exam   Constitutional: She is oriented to person, place, and time  Vital signs are normal  She appears well-developed and well-nourished  No distress  HENT:   Head: Normocephalic and atraumatic  Eyes: Pupils are equal, round, and reactive to light  Conjunctivae and EOM are normal    R upper eyelid swelling, erythema and tenderness  No vesicles or pustules  Neck: Normal range of motion  No thyromegaly present     Cardiovascular: Normal rate, regular rhythm, normal heart sounds and intact distal pulses  No murmur heard  Pulmonary/Chest: Effort normal and breath sounds normal  No respiratory distress  She has no wheezes  Abdominal: Soft  Bowel sounds are normal    Musculoskeletal: Normal range of motion  Neurological: She is alert and oriented to person, place, and time  Skin: Skin is warm and dry  Psychiatric: She has a normal mood and affect  BMI Counseling: Body mass index is 28 09 kg/m²  Discussed the patient's BMI with her  The BMI is above average  BMI counseling and education was provided to the patient  Nutrition recommendations include 3-5 servings of fruits/vegetables daily  Exercise recommendations include exercising 3-5 times per week

## 2019-07-22 NOTE — PROGRESS NOTES
Assessment/Plan:    No problem-specific Assessment & Plan notes found for this encounter  {Assess/PlanSmartLinks:52624}      Subjective:      Patient ID: Rg Julio is a 60 y o  female  HPI    {Common ambulatory SmartLinks:45523}    Review of Systems      Objective: There were no vitals taken for this visit           Physical Exam

## 2020-01-31 ENCOUNTER — TELEPHONE (OUTPATIENT)
Dept: FAMILY MEDICINE CLINIC | Facility: CLINIC | Age: 65
End: 2020-01-31

## 2020-01-31 DIAGNOSIS — Z12.31 ENCOUNTER FOR SCREENING MAMMOGRAM FOR BREAST CANCER: Primary | ICD-10-CM

## 2020-02-08 DIAGNOSIS — E78.00 HIGH CHOLESTEROL: ICD-10-CM

## 2020-02-10 DIAGNOSIS — E78.00 HIGH CHOLESTEROL: Primary | ICD-10-CM

## 2020-02-10 RX ORDER — ATORVASTATIN CALCIUM 20 MG/1
TABLET, FILM COATED ORAL
Qty: 90 TABLET | Refills: 0 | Status: SHIPPED | OUTPATIENT
Start: 2020-02-10 | End: 2020-05-21

## 2020-02-20 ENCOUNTER — OFFICE VISIT (OUTPATIENT)
Dept: FAMILY MEDICINE CLINIC | Facility: CLINIC | Age: 65
End: 2020-02-20
Payer: COMMERCIAL

## 2020-02-20 DIAGNOSIS — F43.21 GRIEF: Primary | ICD-10-CM

## 2020-02-20 DIAGNOSIS — F41.9 ANXIETY: ICD-10-CM

## 2020-02-20 PROBLEM — H02.843 SWELLING OF RIGHT EYELID: Status: RESOLVED | Noted: 2019-07-22 | Resolved: 2020-02-20

## 2020-02-20 PROCEDURE — 99213 OFFICE O/P EST LOW 20 MIN: CPT | Performed by: FAMILY MEDICINE

## 2020-02-20 RX ORDER — ESCITALOPRAM OXALATE 5 MG/1
5 TABLET ORAL DAILY
Qty: 30 TABLET | Refills: 5 | Status: SHIPPED | OUTPATIENT
Start: 2020-02-20 | End: 2020-03-19 | Stop reason: SDUPTHER

## 2020-02-20 RX ORDER — LORAZEPAM 0.5 MG/1
0.5 TABLET ORAL EVERY 8 HOURS PRN
Qty: 30 TABLET | Refills: 0 | Status: SHIPPED | OUTPATIENT
Start: 2020-02-20 | End: 2020-04-22 | Stop reason: SDUPTHER

## 2020-02-20 NOTE — PROGRESS NOTES
Assessment/Plan:       Problem List Items Addressed This Visit        Other    Grief - Primary    Relevant Medications    escitalopram (LEXAPRO) 5 mg tablet    LORazepam (ATIVAN) 0 5 mg tablet    Anxiety    Relevant Medications    escitalopram (LEXAPRO) 5 mg tablet    LORazepam (ATIVAN) 0 5 mg tablet          Long discussion about grief and depression  Discussed normal stages of grief  She wants to start taking medication  Start Lexapro 5 mg daily  We discussed this can take several weeks to start working  She can use Ativan as needed to help her sleep or when feeling anxious  We discussed this is not intended for long term use  Discussed it can be habit forming and addictive  She was advised to seek counseling  Sh worries about the co-pay for a therapist  I advised her to call her insurance company mental health # and find out the cost  She will continue following up with the therapist with hospice and also with her   Subjective:      Patient ID: Emanuel Medrano is a 60 y o  female  60year old here for grief  She lost her  2 weeks ago from carcinoid tumor  He was on hospice after he was hospitalized  She says he had cancer for 12 years and she was his primary caretaker  She is struggling with grief, feeling lonely, crying  She says she tried to clean out his stuff and couldn't do it  She says she wakes up feeling sad because he is not there  She says she is having trouble sleeping  Says there is a hole that he left in her life  She has 2 sons, 1 lives with her  The other son is going through a separation  She works at [employer] but is on a break from work until the end of the month  She is following up with a counselor through hospice  She is also planning to talk with her   The following portions of the patient's history were reviewed and updated as appropriate:   She  has a past medical history of Depression and Hyperlipidemia    She   Patient Active Problem List Diagnosis Date Noted    Grief 2020    Anxiety 2020    Osteopenia 2018    Hyperpar[facility]roidism ([facility] [address] ) 2018    Hypercalcemia 2018    Post-menopausal 2018    Current every day smoker 2018    Environmental allergies 2018    High cholesterol 2014     She  has a past surgical history that includes  section  Her family history includes Cancer in her father, mother, and sister  She  reports that she has been smoking  She has been smoking about 0 50 packs per day  She has never used smokeless tobacco  She reports that she does not drink alcohol or use drugs  Current Outpatient Medications   Medication Sig Dispense Refill    atorvastatin (LIPITOR) 20 mg tablet TAKE ONE TABLET BY MOUTH EVERY DAY 90 tablet 0    Cholecalciferol (EQL VITAMIN D3) 2000 units CAPS Take by mouth daily      escitalopram (LEXAPRO) 5 mg tablet Take 1 tablet (5 mg total) by mouth daily 30 tablet 5    fluticasone (FLONASE) 50 mcg/act nasal spray 2 sprays into each nostril daily      LORazepam (ATIVAN) 0 5 mg tablet Take 1 tablet (0 5 mg total) by mouth every 8 (eight) hours as needed for anxiety 30 tablet 0    timolol (TIMOPTIC) 0 5 % ophthalmic solution        No current facility-administered medications for this visit  Current Outpatient Medications on File Prior to Visit   Medication Sig    atorvastatin (LIPITOR) 20 mg tablet TAKE ONE TABLET BY MOUTH EVERY DAY    Cholecalciferol (EQL VITAMIN D3) 2000 units CAPS Take by mouth daily    fluticasone (FLONASE) 50 mcg/act nasal spray 2 sprays into each nostril daily    timolol (TIMOPTIC) 0 5 % ophthalmic solution     [DISCONTINUED] brompheniramine-pseudoephedrine-DM 30-2-10 MG/5ML syrup Take 5 mL by mouth 4 (four) times a day as needed for congestion or cough (Patient not taking: Reported on 2019)     No current facility-administered medications on file prior to visit        She is allergic to sulfa antibiotics       Review of Systems   Respiratory: Negative  Cardiovascular: Negative  Neurological: Negative  Psychiatric/Behavioral: Positive for dysphoric mood and sleep disturbance  Negative for suicidal ideas  All other systems reviewed and are negative  Objective: There were no vitals taken for this visit  Physical Exam   Constitutional: She is oriented to person, place, and time  She appears well-developed and well-nourished  HENT:   Head: Normocephalic and atraumatic  Pulmonary/Chest: Effort normal  No respiratory distress  Neurological: She is oriented to person, place, and time  Psychiatric: Her behavior is normal  Judgment and thought content normal  She exhibits a depressed mood  tearful   Nursing note and vitals reviewed

## 2020-03-11 ENCOUNTER — APPOINTMENT (OUTPATIENT)
Dept: LAB | Facility: CLINIC | Age: 65
End: 2020-03-11
Payer: COMMERCIAL

## 2020-03-11 DIAGNOSIS — E78.00 HIGH CHOLESTEROL: ICD-10-CM

## 2020-03-11 LAB
ALBUMIN SERPL BCP-MCNC: 3.8 G/DL (ref 3.5–5)
ALP SERPL-CCNC: 97 U/L (ref 46–116)
ALT SERPL W P-5'-P-CCNC: 22 U/L (ref 12–78)
ANION GAP SERPL CALCULATED.3IONS-SCNC: 2 MMOL/L (ref 4–13)
AST SERPL W P-5'-P-CCNC: 12 U/L (ref 5–45)
BILIRUB SERPL-MCNC: 0.71 MG/DL (ref 0.2–1)
BUN SERPL-MCNC: 16 MG/DL (ref 5–25)
CALCIUM ALBUM COR SERPL-MCNC: 10.6 MG/DL (ref 8.3–10.1)
CALCIUM SERPL-MCNC: 10.4 MG/DL (ref 8.3–10.1)
CHLORIDE SERPL-SCNC: 109 MMOL/L (ref 100–108)
CHOLEST SERPL-MCNC: 169 MG/DL (ref 50–200)
CO2 SERPL-SCNC: 30 MMOL/L (ref 21–32)
CREAT SERPL-MCNC: 0.75 MG/DL (ref 0.6–1.3)
GFR SERPL CREATININE-BSD FRML MDRD: 86 ML/MIN/1.73SQ M
GLUCOSE P FAST SERPL-MCNC: 96 MG/DL (ref 65–99)
HDLC SERPL-MCNC: 57 MG/DL
LDLC SERPL CALC-MCNC: 84 MG/DL (ref 0–100)
POTASSIUM SERPL-SCNC: 4.3 MMOL/L (ref 3.5–5.3)
PROT SERPL-MCNC: 7.2 G/DL (ref 6.4–8.2)
SODIUM SERPL-SCNC: 141 MMOL/L (ref 136–145)
TRIGL SERPL-MCNC: 142 MG/DL

## 2020-03-11 PROCEDURE — 80061 LIPID PANEL: CPT

## 2020-03-11 PROCEDURE — 36415 COLL VENOUS BLD VENIPUNCTURE: CPT

## 2020-03-11 PROCEDURE — 80053 COMPREHEN METABOLIC PANEL: CPT

## 2020-03-12 DIAGNOSIS — E83.52 SERUM CALCIUM ELEVATED: Primary | ICD-10-CM

## 2020-03-17 ENCOUNTER — APPOINTMENT (OUTPATIENT)
Dept: LAB | Facility: CLINIC | Age: 65
End: 2020-03-17
Payer: COMMERCIAL

## 2020-03-17 DIAGNOSIS — E83.52 SERUM CALCIUM ELEVATED: ICD-10-CM

## 2020-03-17 LAB
CA-I BLD-SCNC: 1.37 MMOL/L (ref 1.12–1.32)
PTH-INTACT SERPL-MCNC: 88 PG/ML (ref 18.4–80.1)

## 2020-03-17 PROCEDURE — 36415 COLL VENOUS BLD VENIPUNCTURE: CPT

## 2020-03-17 PROCEDURE — 82330 ASSAY OF CALCIUM: CPT

## 2020-03-17 PROCEDURE — 83970 ASSAY OF PARATHORMONE: CPT

## 2020-03-19 ENCOUNTER — OFFICE VISIT (OUTPATIENT)
Dept: FAMILY MEDICINE CLINIC | Facility: CLINIC | Age: 65
End: 2020-03-19
Payer: COMMERCIAL

## 2020-03-19 VITALS
OXYGEN SATURATION: 94 % | BODY MASS INDEX: 27.54 KG/M2 | HEIGHT: 63 IN | HEART RATE: 70 BPM | WEIGHT: 155.4 LBS | SYSTOLIC BLOOD PRESSURE: 122 MMHG | TEMPERATURE: 98.6 F | DIASTOLIC BLOOD PRESSURE: 80 MMHG

## 2020-03-19 DIAGNOSIS — F43.21 GRIEF: ICD-10-CM

## 2020-03-19 DIAGNOSIS — E83.52 HYPERCALCEMIA: ICD-10-CM

## 2020-03-19 DIAGNOSIS — F41.9 ANXIETY: ICD-10-CM

## 2020-03-19 DIAGNOSIS — Z12.39 SCREENING FOR BREAST CANCER: Primary | ICD-10-CM

## 2020-03-19 DIAGNOSIS — E21.3 HYPERPARATHYROIDISM (HCC): ICD-10-CM

## 2020-03-19 PROCEDURE — 99214 OFFICE O/P EST MOD 30 MIN: CPT | Performed by: FAMILY MEDICINE

## 2020-03-19 PROCEDURE — 3008F BODY MASS INDEX DOCD: CPT | Performed by: FAMILY MEDICINE

## 2020-03-19 RX ORDER — ESCITALOPRAM OXALATE 10 MG/1
10 TABLET ORAL DAILY
Qty: 30 TABLET | Refills: 5 | Status: SHIPPED | OUTPATIENT
Start: 2020-03-19 | End: 2020-04-22 | Stop reason: ALTCHOICE

## 2020-03-19 NOTE — ASSESSMENT & PLAN NOTE
Increase Lexapro to 10 mg daily  Continue p r n  Ativan  Discussed this is not intended for daily use    Will do a follow-up phone call in 1 month

## 2020-03-19 NOTE — PROGRESS NOTES
Assessment/Plan:       Problem List Items Addressed This Visit        Endocrine    Hyperparathyroidism ([address] )       Other    Hypercalcemia    Grief    Relevant Medications    escitalopram (LEXAPRO) 10 mg tablet    Anxiety     Increase Lexapro to 10 mg daily  Continue p r n  Ativan  Discussed this is not intended for daily use  Will do a follow-up phone call in 1 month         Relevant Medications    escitalopram (LEXAPRO) 10 mg tablet      Other Visit Diagnoses     Screening for breast cancer    -  Primary    Relevant Orders    Mammo diagnostic bilateral w cad            Subjective:      Patient ID: Hermila Dempsey is a 60 y o  female  She is here for follow up on anxiety  She was started on Lexapro 5 mg, tolerating it well  She does not think this is helping  She is taking Ativan about 3 times per week  She is still grieving her  who passed away 5 weeks ago  She is lonely  She is talking to a hospice nurse weekly  She declines therapy  She had labs done which showed an elevated calcium level  She has had this since at least 5 years  She had seen endocrinology in the past, most recently in 2018  Her notes were reviewed  She did have a DEXA scan  She had no follow-up planned  She does not take any calcium supplements  She denies any history of kidney stones  The following portions of the patient's history were reviewed and updated as appropriate:   She  has a past medical history of Depression and Hyperlipidemia  She   Patient Active Problem List    Diagnosis Date Noted    Grief 2020    Anxiety 2020    Osteopenia 2018    Hyperparathyroidism ([address] ) 2018    Hypercalcemia 2018    Post-menopausal 2018    Current every day smoker 2018    Environmental allergies 2018    High cholesterol 2014     She  has a past surgical history that includes  section    Her family history includes Cancer in her father, mother, and sister  She  reports that she has been smoking  She has been smoking about 0 50 packs per day  She has never used smokeless tobacco  She reports that she does not drink alcohol or use drugs  Current Outpatient Medications   Medication Sig Dispense Refill    atorvastatin (LIPITOR) 20 mg tablet TAKE ONE TABLET BY MOUTH EVERY DAY 90 tablet 0    Cholecalciferol (EQL VITAMIN D3) 2000 units CAPS Take by mouth daily      escitalopram (LEXAPRO) 10 mg tablet Take 1 tablet (10 mg total) by mouth daily 30 tablet 5    LORazepam (ATIVAN) 0 5 mg tablet Take 1 tablet (0 5 mg total) by mouth every 8 (eight) hours as needed for anxiety 30 tablet 0    timolol (TIMOPTIC) 0 5 % ophthalmic solution       fluticasone (FLONASE) 50 mcg/act nasal spray 2 sprays into each nostril daily       No current facility-administered medications for this visit  Current Outpatient Medications on File Prior to Visit   Medication Sig    atorvastatin (LIPITOR) 20 mg tablet TAKE ONE TABLET BY MOUTH EVERY DAY    Cholecalciferol (EQL VITAMIN D3) 2000 units CAPS Take by mouth daily    LORazepam (ATIVAN) 0 5 mg tablet Take 1 tablet (0 5 mg total) by mouth every 8 (eight) hours as needed for anxiety    timolol (TIMOPTIC) 0 5 % ophthalmic solution     [DISCONTINUED] escitalopram (LEXAPRO) 5 mg tablet Take 1 tablet (5 mg total) by mouth daily    fluticasone (FLONASE) 50 mcg/act nasal spray 2 sprays into each nostril daily     No current facility-administered medications on file prior to visit  She is allergic to sulfa antibiotics       Review of Systems   Constitutional: Negative  Negative for activity change  Respiratory: Negative for chest tightness and shortness of breath  Cardiovascular: Negative for chest pain and leg swelling  Neurological: Negative for headaches  Psychiatric/Behavioral: Positive for dysphoric mood and sleep disturbance  The patient is nervous/anxious            Objective:      /80   Pulse 70   Temp 98 6 °F (37 °C) (Tympanic)   Ht 5' 3" (1 6 m)   Wt 70 5 kg (155 lb 6 4 oz)   SpO2 94%   BMI 27 53 kg/m²          Physical Exam   Constitutional: She is oriented to person, place, and time  She appears well-developed and well-nourished  No distress  HENT:   Head: Normocephalic and atraumatic  Eyes: Pupils are equal, round, and reactive to light  Conjunctivae are normal    Neck: Neck supple  Cardiovascular: Normal rate, regular rhythm and normal heart sounds  Exam reveals no gallop and no friction rub  No murmur heard  Pulmonary/Chest: Effort normal and breath sounds normal  No respiratory distress  She has no wheezes  She has no rales  She exhibits no tenderness  Musculoskeletal: Normal range of motion  She exhibits no edema  Neurological: She is alert and oriented to person, place, and time  Skin: Skin is warm and dry  No rash noted  She is not diaphoretic  Psychiatric: Her behavior is normal  Judgment and thought content normal  She exhibits a depressed mood  tearful   Nursing note and vitals reviewed

## 2020-04-22 ENCOUNTER — TELEPHONE (OUTPATIENT)
Dept: FAMILY MEDICINE CLINIC | Facility: CLINIC | Age: 65
End: 2020-04-22

## 2020-04-22 DIAGNOSIS — F41.9 ANXIETY: ICD-10-CM

## 2020-04-22 DIAGNOSIS — F43.21 GRIEF: ICD-10-CM

## 2020-04-22 RX ORDER — LORAZEPAM 0.5 MG/1
0.5 TABLET ORAL EVERY 8 HOURS PRN
Qty: 30 TABLET | Refills: 0 | Status: SHIPPED | OUTPATIENT
Start: 2020-04-22 | End: 2020-04-22 | Stop reason: SDUPTHER

## 2020-04-22 RX ORDER — LORAZEPAM 0.5 MG/1
0.5 TABLET ORAL EVERY 8 HOURS PRN
Qty: 30 TABLET | Refills: 0 | Status: SHIPPED | OUTPATIENT
Start: 2020-04-22 | End: 2020-07-13 | Stop reason: SDUPTHER

## 2020-04-23 RX ORDER — LORAZEPAM 0.5 MG/1
0.5 TABLET ORAL EVERY 8 HOURS PRN
Qty: 30 TABLET | Refills: 0 | OUTPATIENT
Start: 2020-04-23

## 2020-05-21 DIAGNOSIS — E78.00 HIGH CHOLESTEROL: ICD-10-CM

## 2020-05-21 RX ORDER — ATORVASTATIN CALCIUM 20 MG/1
TABLET, FILM COATED ORAL
Qty: 90 TABLET | Refills: 0 | Status: SHIPPED | OUTPATIENT
Start: 2020-05-21 | End: 2020-08-27

## 2020-07-13 DIAGNOSIS — F41.9 ANXIETY: ICD-10-CM

## 2020-07-13 DIAGNOSIS — F43.21 GRIEF: ICD-10-CM

## 2020-07-13 RX ORDER — LORAZEPAM 0.5 MG/1
0.5 TABLET ORAL EVERY 8 HOURS PRN
Qty: 30 TABLET | Refills: 0 | Status: SHIPPED | OUTPATIENT
Start: 2020-07-13 | End: 2020-10-19 | Stop reason: SDUPTHER

## 2020-08-27 DIAGNOSIS — E78.00 HIGH CHOLESTEROL: ICD-10-CM

## 2020-08-27 RX ORDER — ATORVASTATIN CALCIUM 20 MG/1
TABLET, FILM COATED ORAL
Qty: 90 TABLET | Refills: 0 | Status: SHIPPED | OUTPATIENT
Start: 2020-08-27 | End: 2020-11-27

## 2020-10-19 DIAGNOSIS — F43.21 GRIEF: ICD-10-CM

## 2020-10-19 DIAGNOSIS — F41.9 ANXIETY: ICD-10-CM

## 2020-10-19 RX ORDER — LORAZEPAM 0.5 MG/1
0.5 TABLET ORAL EVERY 8 HOURS PRN
Qty: 30 TABLET | Refills: 0 | Status: SHIPPED | OUTPATIENT
Start: 2020-10-19 | End: 2021-01-06

## 2020-11-27 DIAGNOSIS — E78.00 HIGH CHOLESTEROL: ICD-10-CM

## 2020-11-27 RX ORDER — ATORVASTATIN CALCIUM 20 MG/1
TABLET, FILM COATED ORAL
Qty: 90 TABLET | Refills: 0 | Status: SHIPPED | OUTPATIENT
Start: 2020-11-27 | End: 2021-02-25

## 2020-12-26 ENCOUNTER — OFFICE VISIT (OUTPATIENT)
Dept: URGENT CARE | Facility: CLINIC | Age: 65
End: 2020-12-26
Payer: COMMERCIAL

## 2020-12-26 ENCOUNTER — APPOINTMENT (OUTPATIENT)
Dept: RADIOLOGY | Facility: CLINIC | Age: 65
End: 2020-12-26
Payer: COMMERCIAL

## 2020-12-26 VITALS
OXYGEN SATURATION: 98 % | RESPIRATION RATE: 18 BRPM | HEART RATE: 55 BPM | BODY MASS INDEX: 26.58 KG/M2 | DIASTOLIC BLOOD PRESSURE: 82 MMHG | HEIGHT: 63 IN | SYSTOLIC BLOOD PRESSURE: 146 MMHG | TEMPERATURE: 97.8 F | WEIGHT: 150 LBS

## 2020-12-26 DIAGNOSIS — M25.551 RIGHT HIP PAIN: Primary | ICD-10-CM

## 2020-12-26 DIAGNOSIS — M25.551 RIGHT HIP PAIN: ICD-10-CM

## 2020-12-26 PROCEDURE — 99203 OFFICE O/P NEW LOW 30 MIN: CPT | Performed by: FAMILY MEDICINE

## 2020-12-26 PROCEDURE — S9088 SERVICES PROVIDED IN URGENT: HCPCS | Performed by: FAMILY MEDICINE

## 2020-12-26 PROCEDURE — 73502 X-RAY EXAM HIP UNI 2-3 VIEWS: CPT

## 2020-12-29 ENCOUNTER — APPOINTMENT (OUTPATIENT)
Dept: RADIOLOGY | Facility: CLINIC | Age: 65
End: 2020-12-29
Payer: COMMERCIAL

## 2020-12-29 ENCOUNTER — OFFICE VISIT (OUTPATIENT)
Dept: OBGYN CLINIC | Facility: CLINIC | Age: 65
End: 2020-12-29
Payer: COMMERCIAL

## 2020-12-29 VITALS
HEIGHT: 63 IN | DIASTOLIC BLOOD PRESSURE: 72 MMHG | WEIGHT: 151 LBS | BODY MASS INDEX: 26.75 KG/M2 | SYSTOLIC BLOOD PRESSURE: 112 MMHG

## 2020-12-29 DIAGNOSIS — M16.11 PRIMARY OSTEOARTHRITIS OF ONE HIP, RIGHT: Primary | ICD-10-CM

## 2020-12-29 DIAGNOSIS — M54.9 BACK PAIN, UNSPECIFIED BACK LOCATION, UNSPECIFIED BACK PAIN LATERALITY, UNSPECIFIED CHRONICITY: ICD-10-CM

## 2020-12-29 DIAGNOSIS — M25.551 RIGHT HIP PAIN: ICD-10-CM

## 2020-12-29 DIAGNOSIS — M25.551 PAIN IN RIGHT HIP: ICD-10-CM

## 2020-12-29 PROCEDURE — 3008F BODY MASS INDEX DOCD: CPT | Performed by: ORTHOPAEDIC SURGERY

## 2020-12-29 PROCEDURE — 99203 OFFICE O/P NEW LOW 30 MIN: CPT | Performed by: ORTHOPAEDIC SURGERY

## 2020-12-29 PROCEDURE — 72100 X-RAY EXAM L-S SPINE 2/3 VWS: CPT

## 2020-12-29 RX ORDER — MELOXICAM 15 MG/1
15 TABLET ORAL DAILY
Qty: 30 TABLET | Refills: 1 | Status: SHIPPED | OUTPATIENT
Start: 2020-12-29 | End: 2021-01-26 | Stop reason: SDUPTHER

## 2021-01-06 ENCOUNTER — EVALUATION (OUTPATIENT)
Dept: PHYSICAL THERAPY | Age: 66
End: 2021-01-06
Payer: COMMERCIAL

## 2021-01-06 DIAGNOSIS — F43.21 GRIEF: ICD-10-CM

## 2021-01-06 DIAGNOSIS — M25.551 PAIN IN RIGHT HIP: Primary | ICD-10-CM

## 2021-01-06 DIAGNOSIS — F41.9 ANXIETY: ICD-10-CM

## 2021-01-06 DIAGNOSIS — M16.11 PRIMARY OSTEOARTHRITIS OF ONE HIP, RIGHT: ICD-10-CM

## 2021-01-06 PROCEDURE — 97110 THERAPEUTIC EXERCISES: CPT | Performed by: PHYSICAL THERAPIST

## 2021-01-06 PROCEDURE — 97140 MANUAL THERAPY 1/> REGIONS: CPT | Performed by: PHYSICAL THERAPIST

## 2021-01-06 PROCEDURE — 97161 PT EVAL LOW COMPLEX 20 MIN: CPT | Performed by: PHYSICAL THERAPIST

## 2021-01-06 RX ORDER — LORAZEPAM 0.5 MG/1
TABLET ORAL
Qty: 30 TABLET | Refills: 0 | Status: SHIPPED | OUTPATIENT
Start: 2021-01-06 | End: 2021-03-25

## 2021-01-06 NOTE — PROGRESS NOTES
PT Evaluation     Today's date: 2021  Patient name: Kerline Beckman  : 1955  MRN: [id number]  Referring provider: Ronaldo Martínez DO  Dx:   Encounter Diagnosis     ICD-10-CM    1  Pain in right hip  M25 551 Ambulatory referral to Physical Therapy   2  Primary osteoarthritis of one hip, right  M16 11 Ambulatory referral to Physical Therapy       Start Time: 1445  Stop Time: 1545  Total time in clinic (min): 60 minutes    Assessment  Assessment details: Kerline Beckman is a 63 y o  female who presents with pain, decreased strength, decreased ROM, decreased joint mobility, ambulatory dysfunction and postural  dysfunction  Due to these impairments, Patient has difficulty performing a/iadls and work-related activities  Patient's clinical presentation is consistent with their referring diagnosis of right hip OA  Patient would benefit from skilled physical therapy to address their aforementioned impairments, improve their level of function and to improve their overall quality of life  Impairments: abnormal gait, abnormal muscle tone, abnormal or restricted ROM, abnormal movement, activity intolerance, impaired balance, impaired physical strength, lacks appropriate home exercise program, pain with function, weight-bearing intolerance, poor posture  and poor body mechanics  Understanding of Dx/Px/POC: good   Prognosis: good    Goals  ST-3 WEEKS  1  Decrease pain by 2 points on VAS at its worst   2   Increase ROM by > 5 deg in all deficients planes  3   Increase LE by 1/2 MMT grade in all deficient planes  LT-6 WEEKS  1  Patient to be independent with a/iadls increased standing and walking tolerance and pain free work  2  Increase functional activities for leisure and home activities to previous LOF    3  Independent with HEP and/or fitness program     Plan  Patient would benefit from: skilled physical therapy  Planned modality interventions: cryotherapy, electrical stimulation/Russian stimulation, thermotherapy: hydrocollator packs and unattended electrical stimulation  Planned therapy interventions: activity modification, behavior modification, body mechanics training, aquatic therapy, flexibility, functional ROM exercises, home exercise program, IADL retraining, joint mobilization, manual therapy, neuromuscular re-education, patient education, postural training, strengthening, stretching, therapeutic activities and therapeutic exercise  Frequency: 2x week (2-3x week)  Duration in weeks: 12  Plan of Care beginning date: 2021  Plan of Care expiration date: 2021  Treatment plan discussed with: patient        Subjective Evaluation    History of Present Illness  Date of onset: 2020  Mechanism of injury: Right hip pain secondary to unknown, complains of right hip pain xrays show OA to right hip, complains of right hip stiffness and pain using Mobic, increased pain after working all day at [employer]          Not a recurrent problem   Quality of life: good    Pain  Current pain ratin  At best pain ratin  At worst pain ratin  Quality: throbbing and radiating  Relieving factors: medications  Aggravating factors: standing, walking and stair climbing  Progression: improved    Social Support  Steps to enter house: yes  Stairs in house: yes   L[facility] in: [facility]  Lives with: adult children      Diagnostic Tests  X-ray: abnormal  Patient Goals  Patient goals for therapy: decreased pain, increased motion, increased strength and independence with ADLs/IADLs          Objective     Static Posture     Thoracic Spine  Hyperkyphosis      Active Range of Motion   Cervical/Thoracic Spine       Thoracic    Extension:  Restriction level: moderate    Lumbar   Extension: 20 degrees   Left Hip   Flexion: 20 degrees   External rotation (90/90): 45 degrees   Internal rotation (90/90): 30 degrees     Right Hip   Flexion: 110 degrees   External rotation (90/90): 35 degrees   Internal rotation (90/90): 15 degrees Strength/Myotome Testing     Right Hip   Planes of Motion   Flexion: 4  Extension: 4  Abduction: 4  Adduction: 4+  External rotation: 4-  Internal rotation: 4-    Tests     Right Hip   Positive AVERY and LULY  Modified Steven: Positive  Ambulation     Observational Gait   Gait: antalgic   Decreased walking speed and stride length       Functional Assessment        Single Leg Stance   Left: 10 seconds  Right: 9 seconds      Flowsheet Rows      Most Recent Value   PT/OT G-Codes   Current Score  59   Projected Score  74   Assessment Type  Evaluation   G code set  Mobility: Walking & Moving Around   Mobility: Walking and Moving Around Current Status ()  CJ   Mobility: Walking and Moving Around Goal Status ()  CI             Precautions: depression    Manuals 1//6                         MT right hip 15                                      Neuro Re-Ed                                                                                                        Ther Ex             NU STEP 5 min            Bridge  20x            Hip add ball 20x            Hip abd band 20x            Ball slides 20x            LAQ 4/30            Stand hip abd 20x            HEP 5 min            Ther Activity                                       Gait Training                                       Modalities

## 2021-01-13 ENCOUNTER — OFFICE VISIT (OUTPATIENT)
Dept: PHYSICAL THERAPY | Age: 66
End: 2021-01-13
Payer: COMMERCIAL

## 2021-01-13 DIAGNOSIS — M25.551 PAIN IN RIGHT HIP: Primary | ICD-10-CM

## 2021-01-13 DIAGNOSIS — M16.11 PRIMARY OSTEOARTHRITIS OF ONE HIP, RIGHT: ICD-10-CM

## 2021-01-13 PROCEDURE — 97110 THERAPEUTIC EXERCISES: CPT

## 2021-01-13 NOTE — PROGRESS NOTES
Daily Note     Today's date: 2021  Patient name: Soto Willis  : 1955  MRN: [id number]  Referring provider: Clyde Brandt DO  Dx:   Encounter Diagnosis     ICD-10-CM    1  Pain in right hip  M25 551    2  Primary osteoarthritis of one hip, right  M16 11        Start Time: 1430  Stop Time: 1515  Total time in clinic (min): 45 minutes    Subjective: Reports 4/10 pain at her R hip  At end of session patient expressed concerns with stress at home and having to do a lot of physical work to keep everything in order since her  has passed  Patient did not exhibit signs of self harm, just grief  Did recommend reaching out to her PCP for recommendations for possible counseling and requested she contact us if she needed help doing so  Patient states she was in a support group that had closed down secondary to COVID-19 restrictions  Recommended patient reach out for possible virtual support groups and patient agreed she would if she felt she needed to do so  Also reports being able to get help from neighbors and her sisters  Objective: See treatment diary below      Assessment: Tolerated treatment well  Provided patient with additional exercises to perform at home secondary to a high copay  Recommended patient try the exercises and if anything gives her increased pain to hold on those activities  Specific instructions for SLR were given to perform only 5 reps at a time and to perform in a pain free range as she has expressed difficulty lifting her leg  Suggested patient try the HEP for two weeks and then return for a f/u with her primary PT to ensure she is performing her exercises properly and to make modifications or progressions as needed  Patient was in agreement  Patient would benefit from continued PT  Plan: Continue per plan of care        Precautions: depression    Manuals                         MT right hip 15 15'                                     Neuro Re-Ed Ther Ex             NU STEP 5 min 8 min           Bridge  20x 30x           Hip add ball 20x 20x           Hip abd band 20x 20x           Ball slides 20x 20x           SLR  x5           LAQ 4/30 4# 30x           Stand hip abd 20x 20x           Stand hip ext   20x           HR   20x           Stand marching  20x                                                  HEP 5 min            Ther Activity                                       Gait Training                                       Modalities

## 2021-01-26 ENCOUNTER — OFFICE VISIT (OUTPATIENT)
Dept: OBGYN CLINIC | Facility: CLINIC | Age: 66
End: 2021-01-26
Payer: COMMERCIAL

## 2021-01-26 VITALS
DIASTOLIC BLOOD PRESSURE: 83 MMHG | HEIGHT: 63 IN | SYSTOLIC BLOOD PRESSURE: 121 MMHG | BODY MASS INDEX: 26.58 KG/M2 | WEIGHT: 150 LBS | HEART RATE: 72 BPM

## 2021-01-26 DIAGNOSIS — M25.551 PAIN IN RIGHT HIP: ICD-10-CM

## 2021-01-26 DIAGNOSIS — M16.11 PRIMARY OSTEOARTHRITIS OF ONE HIP, RIGHT: ICD-10-CM

## 2021-01-26 PROCEDURE — 99213 OFFICE O/P EST LOW 20 MIN: CPT | Performed by: ORTHOPAEDIC SURGERY

## 2021-01-26 PROCEDURE — 3008F BODY MASS INDEX DOCD: CPT | Performed by: ORTHOPAEDIC SURGERY

## 2021-01-26 RX ORDER — MELOXICAM 15 MG/1
15 TABLET ORAL DAILY
Qty: 30 TABLET | Refills: 0 | Status: SHIPPED | OUTPATIENT
Start: 2021-01-26 | End: 2021-04-01

## 2021-01-26 NOTE — PROGRESS NOTES
Patient Name:  Ethan Nassar  MRN:  [id number]    Assessment & Plan     1  Pain in right hip  -     meloxicam (MOBIC) 15 mg tablet; Take 1 tablet (15 mg total) by mouth daily    2  Primary osteoarthritis of one hip, right  -     meloxicam (MOBIC) 15 mg tablet; Take 1 tablet (15 mg total) by mouth daily        26-year-old female with symptomatic right hip osteoarthritis  Patient has had significant improvement with physical therapy and adherence to home exercise with administration of meloxicam 15 mg daily  Patient would like to continue physical therapy once a month that she has a high co-pay  She does have significant amount of exercises at home to perform  New prescription of Mobic has been sent to patient's pharmacy and instructed to discontinue any additional NSAIDs with administration of Mobic  At this time, patient can be seen on an as-needed basis secondary to significant improvement  If in the future patient continues to have right hip/groin pain, can consider a fluoroscopic guided cortisone injection at that time  History of the Present Illness   Ethan Nassar is a 63 y o  female with right hip osteoarthritis  Patient has been attending physical therapy and performing home exercises as prescribed  The patient reports that approximately 80-90% improvement of right hip pain, range of motion and strength  She also has been taking meloxicam 15 mg daily without additional and said administration  Patient is overall happy with outcome of physical therapy  She does still experience some pain at night with right side-lying position  Review of Systems     Review of Systems   Constitutional: Negative for chills and fever  HENT: Negative for congestion  Respiratory: Negative for cough, chest tightness and shortness of breath  Cardiovascular: Negative for chest pain and palpitations  Gastrointestinal: Negative for abdominal pain     Endocrine: Negative for cold intolerance and heat intolerance  Musculoskeletal: Positive for arthralgias and back pain  Neurological: Negative for syncope  Psychiatric/Behavioral: Negative for confusion  The patient is nervous/anxious  Physical Exam     /83   Pulse 72   Ht 5' 3" (1 6 m)   Wt 68 kg (150 lb)   BMI 26 57 kg/m²       Right hip:  Passive range of motion to 100 degrees flexion, 50 abduction, 30 external rotation, 15 internal rotation  Patient can perform straight leg raise with minimal groin pain  Negative logroll, equivocal Stinchfield, negative AVERY, negative FADDIR  Full sensation present with light touch throughout right lower extremity, extremity warm and well perfused  Data Review     I have personally reviewed pertinent films in PACS, and my interpretation follows      No new imaging needed    Social History     Tobacco Use    Smoking status: Current Every Day Smoker     Packs/day: 0 50    Smokeless tobacco: Never Used   Substance Use Topics    Alcohol use: No    Drug use: No           Procedures   None    Nas Early PA-C

## 2021-01-27 ENCOUNTER — OFFICE VISIT (OUTPATIENT)
Dept: PHYSICAL THERAPY | Age: 66
End: 2021-01-27
Payer: COMMERCIAL

## 2021-01-27 DIAGNOSIS — M16.11 PRIMARY OSTEOARTHRITIS OF ONE HIP, RIGHT: ICD-10-CM

## 2021-01-27 DIAGNOSIS — M25.551 PAIN IN RIGHT HIP: Primary | ICD-10-CM

## 2021-01-27 PROCEDURE — 97110 THERAPEUTIC EXERCISES: CPT | Performed by: PHYSICAL THERAPIST

## 2021-01-27 NOTE — PROGRESS NOTES
Daily Note     Today's date: 2021  Patient name: Emanuel Medrano  : 1955  MRN: [id number]  Referring provider: Maribell Merida DO  Dx:   Encounter Diagnosis     ICD-10-CM    1  Pain in right hip  M25 551    2  Primary osteoarthritis of one hip, right  M16 11        Start Time: 1510  Stop Time: 1535  Total time in clinic (min): 25 minutes    Subjective: walking much better      Objective: See treatment diary below      Assessment: Tolerated treatment well  Patient demonstrated fatigue post treatment, exhibited good technique with therapeutic exercises and would benefit from continued PT, increased HEP, still with decreased hip IR/ER      Plan: Progress treatment as tolerated    Wean towards HEP     Precautions: depression    Manuals                        MT right hip 15 15' 15'                                    Neuro Re-Ed                                                                                                        Ther Ex             NU STEP 5 min 8 min 8 min          Bridge  20x 30x 30x          Hip add ball 20x 20x 30x          Hip abd band 20x 20x 30x          Ball slides 20x 20x 30x          SLR  x5 10x          LAQ 4/30 4# 30x 4/30          Stand hip abd 20x 20x 3/30          Stand hip ext   20x 3/30          HR   20x 30x          Stand marching  20x 3/30                                                 HEP 5 min            Ther Activity                                       Gait Training                                       Modalities

## 2021-02-25 DIAGNOSIS — E78.00 HIGH CHOLESTEROL: ICD-10-CM

## 2021-02-25 RX ORDER — ATORVASTATIN CALCIUM 20 MG/1
TABLET, FILM COATED ORAL
Qty: 90 TABLET | Refills: 0 | Status: SHIPPED | OUTPATIENT
Start: 2021-02-25 | End: 2021-05-12 | Stop reason: SDUPTHER

## 2021-03-24 DIAGNOSIS — F41.9 ANXIETY: ICD-10-CM

## 2021-03-24 DIAGNOSIS — F43.21 GRIEF: ICD-10-CM

## 2021-03-25 RX ORDER — LORAZEPAM 0.5 MG/1
TABLET ORAL
Qty: 30 TABLET | Refills: 0 | Status: SHIPPED | OUTPATIENT
Start: 2021-03-25 | End: 2021-09-13 | Stop reason: SDUPTHER

## 2021-04-01 ENCOUNTER — OFFICE VISIT (OUTPATIENT)
Dept: FAMILY MEDICINE CLINIC | Facility: CLINIC | Age: 66
End: 2021-04-01
Payer: COMMERCIAL

## 2021-04-01 VITALS
DIASTOLIC BLOOD PRESSURE: 82 MMHG | WEIGHT: 146.4 LBS | BODY MASS INDEX: 25.94 KG/M2 | TEMPERATURE: 97.3 F | HEIGHT: 63 IN | HEART RATE: 68 BPM | OXYGEN SATURATION: 97 % | SYSTOLIC BLOOD PRESSURE: 132 MMHG

## 2021-04-01 DIAGNOSIS — F41.9 ANXIETY: ICD-10-CM

## 2021-04-01 DIAGNOSIS — Z79.899 BENZODIAZEPINE CONTRACT EXISTS: ICD-10-CM

## 2021-04-01 DIAGNOSIS — F17.200 CURRENT EVERY DAY SMOKER: ICD-10-CM

## 2021-04-01 DIAGNOSIS — F33.1 MODERATE EPISODE OF RECURRENT MAJOR DEPRESSIVE DISORDER (HCC): Primary | ICD-10-CM

## 2021-04-01 PROBLEM — F43.21 GRIEF: Status: RESOLVED | Noted: 2020-02-20 | Resolved: 2021-04-01

## 2021-04-01 PROCEDURE — 3725F SCREEN DEPRESSION PERFORMED: CPT | Performed by: FAMILY MEDICINE

## 2021-04-01 PROCEDURE — 3008F BODY MASS INDEX DOCD: CPT | Performed by: FAMILY MEDICINE

## 2021-04-01 PROCEDURE — 99214 OFFICE O/P EST MOD 30 MIN: CPT | Performed by: FAMILY MEDICINE

## 2021-04-01 RX ORDER — SERTRALINE HYDROCHLORIDE 25 MG/1
25 TABLET, FILM COATED ORAL DAILY
Qty: 30 TABLET | Refills: 1 | Status: SHIPPED | OUTPATIENT
Start: 2021-04-01 | End: 2021-05-12 | Stop reason: SDUPTHER

## 2021-04-01 NOTE — PROGRESS NOTES
Assessment/Plan:       Problem List Items Addressed This Visit        Other    Current every day smoker    Anxiety    Relevant Orders    Ambulatory referral to [facility]    Benzodiazepine contract exists      Other Visit Diagnoses     Moderate episode of recurrent major depressive disorder (HCC)    -  Primary    Relevant Medications    sertraline (ZOLOFT) 25 mg tablet    Other Relevant Orders    Ambulatory referral to [facility] discussed with patient regarding depression/anxiety  Agrees to start Zoloft 25 mg daily  Discussed common side effects  Discussed risks of Ativan use  PDMP verified  Signed contract, UDS collected  Refer to counseling  F/U 4 weeks    Subjective:      Patient ID: Janusz Lombardo is a 63 y o  female  63year old here for a check up  She has not been seen in a year  She has anxiety related to multiple life stressors  She lost her  a year ago  Her son is going through a divorce, her daughter is having work related issues  She is working which says is going well  She is very tearful today in the office  She has a high score on her depression screen, 19 pts    She is currently only on Ativan  She says she takes it at night to help her sleep when she has to go to work the next day  She also takes it during the day sometimes and feeling panicked  She says she does not take it every day  We had tried her on Lexapro last year but she said it did not help  She has not been in counseling  She previously was seeing a counselor through hospice after her  passed  She exercises everyday - for PT  She says she tries to eat healthy    She is a current smoker  Says she has been smoking more lately due to the anxiety  She says it is the only way she can cope  Denies alcohol  The following portions of the patient's history were reviewed and updated as appropriate:   She  has a past medical history of Depression and Hyperlipidemia    She   Patient Active Problem List    Diagnosis Date Noted    Benzodiazepine contract exists 2021    Anxiety 2020    Osteopenia 2018    Hyperparathyroidism ([facility] [address] ) 2018    Hypercalcemia 2018    Post-menopausal 2018    Current every day smoker 2018    Environmental allergies 2018    High cholesterol 2014     She  has a past surgical history that includes  section  Her family history includes Cancer in her father, mother, and sister  She  reports that she has been smoking  She has been smoking about 0 50 packs per day  She has never used smokeless tobacco  She reports that she does not drink alcohol or use drugs  Current Outpatient Medications   Medication Sig Dispense Refill    atorvastatin (LIPITOR) 20 mg tablet TAKE ONE TABLET BY MOUTH EVERY DAY 90 tablet 0    Cholecalciferol (EQL VITAMIN D3) 2000 units CAPS Take by mouth daily      fluticasone (FLONASE) 50 mcg/act nasal spray 2 sprays into each nostril daily      LORazepam (ATIVAN) 0 5 mg tablet TAKE ONE TABLET BY MOUTH EVERY 8 HOURS AS NEEDED FOR ANXIETY 30 tablet 0    sertraline (ZOLOFT) 25 mg tablet Take 1 tablet (25 mg total) by mouth daily 30 tablet 1    timolol (TIMOPTIC) 0 5 % ophthalmic solution        No current facility-administered medications for this visit  Current Outpatient Medications on File Prior to Visit   Medication Sig    atorvastatin (LIPITOR) 20 mg tablet TAKE ONE TABLET BY MOUTH EVERY DAY    Cholecalciferol (EQL VITAMIN D3) 2000 units CAPS Take by mouth daily    fluticasone (FLONASE) 50 mcg/act nasal spray 2 sprays into each nostril daily    LORazepam (ATIVAN) 0 5 mg tablet TAKE ONE TABLET BY MOUTH EVERY 8 HOURS AS NEEDED FOR ANXIETY    timolol (TIMOPTIC) 0 5 % ophthalmic solution     [DISCONTINUED] meloxicam (MOBIC) 15 mg tablet Take 1 tablet (15 mg total) by mouth daily     No current facility-administered medications on file prior to visit        She is allergic to sulfa antibiotics       Review of Systems   Constitutional: Negative  Negative for activity change, appetite change, chills, fatigue, fever and unexpected weight change  HENT: Negative for congestion, ear discharge, ear pain, postnasal drip, sinus pressure and sore throat  Eyes: Negative for discharge and visual disturbance  Respiratory: Negative  Negative for cough, shortness of breath and wheezing  Cardiovascular: Negative  Negative for chest pain, palpitations and leg swelling  Gastrointestinal: Negative for abdominal pain, constipation, diarrhea, nausea and vomiting  Endocrine: Negative for cold intolerance, heat intolerance, polydipsia and polyuria  Genitourinary: Negative for difficulty urinating and frequency  Musculoskeletal: Negative for arthralgias, back pain, joint swelling and myalgias  Skin: Negative for rash  Neurological: Negative for dizziness, weakness, light-headedness, numbness and headaches  Hematological: Negative for adenopathy  Psychiatric/Behavioral: Positive for dysphoric mood and sleep disturbance  Negative for behavioral problems, confusion, self-injury and suicidal ideas  The patient is nervous/anxious  Objective:      /82   Pulse 68   Temp (!) 97 3 °F (36 3 °C)   Ht 5' 3" (1 6 m)   Wt 66 4 kg (146 lb 6 4 oz)   SpO2 97%   BMI 25 93 kg/m²          Physical Exam  Vitals signs and nursing note reviewed  Constitutional:       General: She is not in acute distress  Appearance: Normal appearance  She is not ill-appearing, toxic-appearing or diaphoretic  HENT:      Head: Normocephalic and atraumatic  Right Ear: External ear normal       Left Ear: External ear normal    Cardiovascular:      Rate and Rhythm: Normal rate and regular rhythm  Heart sounds: No murmur  No friction rub  Pulmonary:      Effort: Pulmonary effort is normal  No respiratory distress  Breath sounds: Normal breath sounds  No stridor   No wheezing, rhonchi or rales  Musculoskeletal:         General: No swelling  Right lower leg: No edema  Left lower leg: No edema  Neurological:      General: No focal deficit present  Mental Status: She is alert  Mental status is at baseline  Psychiatric:         Attention and Perception: Attention normal          Mood and Affect: Mood normal          Speech: Speech normal          Behavior: Behavior normal          Thought Content:  Thought content normal          Judgment: Judgment normal

## 2021-05-12 ENCOUNTER — OFFICE VISIT (OUTPATIENT)
Dept: FAMILY MEDICINE CLINIC | Facility: CLINIC | Age: 66
End: 2021-05-12
Payer: COMMERCIAL

## 2021-05-12 VITALS
OXYGEN SATURATION: 97 % | BODY MASS INDEX: 26.51 KG/M2 | TEMPERATURE: 97.8 F | SYSTOLIC BLOOD PRESSURE: 124 MMHG | HEART RATE: 62 BPM | WEIGHT: 149.6 LBS | DIASTOLIC BLOOD PRESSURE: 82 MMHG | HEIGHT: 63 IN

## 2021-05-12 DIAGNOSIS — F33.1 MODERATE EPISODE OF RECURRENT MAJOR DEPRESSIVE DISORDER (HCC): ICD-10-CM

## 2021-05-12 DIAGNOSIS — Z11.59 ENCOUNTER FOR HEPATITIS C SCREENING TEST FOR LOW RISK PATIENT: Primary | ICD-10-CM

## 2021-05-12 DIAGNOSIS — E78.00 HIGH CHOLESTEROL: ICD-10-CM

## 2021-05-12 PROCEDURE — 99214 OFFICE O/P EST MOD 30 MIN: CPT | Performed by: FAMILY MEDICINE

## 2021-05-12 RX ORDER — ATORVASTATIN CALCIUM 20 MG/1
20 TABLET, FILM COATED ORAL DAILY
Qty: 90 TABLET | Refills: 3 | Status: SHIPPED | OUTPATIENT
Start: 2021-05-12 | End: 2021-09-13 | Stop reason: SDUPTHER

## 2021-05-12 NOTE — PROGRESS NOTES
Assessment/Plan:       Problem List Items Addressed This Visit        Other    High cholesterol    Relevant Medications    atorvastatin (LIPITOR) 20 mg tablet    Other Relevant Orders    Lipid Panel with Direct LDL reflex    Comprehensive metabolic panel      Other Visit Diagnoses     Encounter for hepatitis C screening test for low risk patient    -  Primary    Relevant Orders    Hepatitis C antibody    Moderate episode of recurrent major depressive disorder (HCC)        Relevant Medications    sertraline (ZOLOFT) 50 mg tablet          Increase Zoloft to 50 mg daily  Has appt with therapist tomorrow  Depression Screening Follow-up Plan: Patient's depression screening was positive with a PHQ-2 score of 6  Their PHQ-9 score was 16  Continue regular follow-up with their psychologist/therapist/psychiatrist who is managing their mental health condition(s)  Update labs     Subjective:      Patient ID: Jerrell Price is a 61 y o  female  61year old here for a recheck on depression  She has been depressed since losing her  about a year ago  She was started on Sertraline 25 mg daily  She is doing well on this has noticed improvement but still has quite a bit of symptoms  She has multiple life stressors  She is tearful in the office today  She is asking "isn't depression normal?"  PHQ 9 score was 19, now 16  She also wants to get a refill on her cholesterol medication  She is due for labs  She is on atorvastatin 20 mg daily      The following portions of the patient's history were reviewed and updated as appropriate:   She  has a past medical history of Depression and Hyperlipidemia    She   Patient Active Problem List    Diagnosis Date Noted    Benzodiazepine contract exists 04/01/2021    Anxiety 02/20/2020    Osteopenia 11/09/2018    Hyperparathyroidism ([address] ) 07/25/2018    Hypercalcemia 07/25/2018    Post-menopausal 07/25/2018    Current every day smoker 02/16/2018    Environmental allergies 2018    High cholesterol 2014     She  has a past surgical history that includes  section  Her family history includes Cancer in her father, mother, and sister  She  reports that she has been smoking  She has been smoking about 0 50 packs per day  She has never used smokeless tobacco  She reports that she does not drink alcohol or use drugs  Current Outpatient Medications   Medication Sig Dispense Refill    atorvastatin (LIPITOR) 20 mg tablet Take 1 tablet (20 mg total) by mouth daily 90 tablet 3    Cholecalciferol (EQL VITAMIN D3) 2000 units CAPS Take by mouth daily      fluticasone (FLONASE) 50 mcg/act nasal spray 2 sprays into each nostril daily      LORazepam (ATIVAN) 0 5 mg tablet TAKE ONE TABLET BY MOUTH EVERY 8 HOURS AS NEEDED FOR ANXIETY 30 tablet 0    sertraline (ZOLOFT) 50 mg tablet Take 1 tablet (50 mg total) by mouth daily 90 tablet 0    timolol (TIMOPTIC) 0 5 % ophthalmic solution        No current facility-administered medications for this visit  Current Outpatient Medications on File Prior to Visit   Medication Sig    Cholecalciferol (EQL VITAMIN D3) 2000 units CAPS Take by mouth daily    fluticasone (FLONASE) 50 mcg/act nasal spray 2 sprays into each nostril daily    LORazepam (ATIVAN) 0 5 mg tablet TAKE ONE TABLET BY MOUTH EVERY 8 HOURS AS NEEDED FOR ANXIETY    timolol (TIMOPTIC) 0 5 % ophthalmic solution     [DISCONTINUED] atorvastatin (LIPITOR) 20 mg tablet TAKE ONE TABLET BY MOUTH EVERY DAY    [DISCONTINUED] sertraline (ZOLOFT) 25 mg tablet Take 1 tablet (25 mg total) by mouth daily     No current facility-administered medications on file prior to visit  She is allergic to sulfa antibiotics       Review of Systems   Psychiatric/Behavioral: Positive for decreased concentration, dysphoric mood and sleep disturbance  Negative for agitation, behavioral problems, confusion, hallucinations, self-injury and suicidal ideas   The patient is not nervous/anxious and is not hyperactive  Objective:      /82   Pulse 62   Temp 97 8 °F (36 6 °C) (Temporal)   Ht 5' 3" (1 6 m)   Wt 67 9 kg (149 lb 9 6 oz)   SpO2 97%   BMI 26 50 kg/m²          Physical Exam  Vitals signs and nursing note reviewed  Constitutional:       General: She is not in acute distress  Appearance: Normal appearance  She is well-developed and normal weight  She is not ill-appearing, toxic-appearing or diaphoretic  HENT:      Head: Normocephalic and atraumatic  Right Ear: External ear normal       Left Ear: External ear normal    Pulmonary:      Effort: Pulmonary effort is normal  No respiratory distress  Neurological:      General: No focal deficit present  Mental Status: She is alert  Mental status is at baseline  Cranial Nerves: No cranial nerve deficit  Psychiatric:         Mood and Affect: Mood is depressed  Affect is tearful  Behavior: Behavior normal          Thought Content:  Thought content normal          Judgment: Judgment normal

## 2021-05-13 ENCOUNTER — SOCIAL WORK (OUTPATIENT)
Dept: BEHAVIORAL/MENTAL HEALTH CLINIC | Facility: CLINIC | Age: 66
End: 2021-05-13
Payer: COMMERCIAL

## 2021-05-13 DIAGNOSIS — F41.9 ANXIETY: Primary | ICD-10-CM

## 2021-05-13 DIAGNOSIS — F43.29 COMPLEX GRIEF DISORDER LASTING LONGER THAN 12 MONTHS: ICD-10-CM

## 2021-05-13 PROCEDURE — 90834 PSYTX W PT 45 MINUTES: CPT | Performed by: SOCIAL WORKER

## 2021-05-13 NOTE — PSYCH
Start time 1:00PM-1:45PM  Assessment/Plan: Initial visit for therapy for treatment of Anxiety and Grief     There are no diagnoses linked to this encounter  Subjective:  Maritza Santacruz shared that her  past away a year ago and she cannot get over it,  He passed from a 12 year mahan with cancer  Her younger son lives with her and her older son is going through a divorce  Her son was running around on his wife and he finally left her and is living with his girlfriend  His kids finally met his girlfriend last weekend  The kids are with her every Tuesday and Thursday and every other weekend  She states that her younger son out of no where flipped out on her ("he had a break down") and since then he has been okay  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  She works part time, she worked full time and since she went on widows social security she can only work part time  Her  was employed by the [employer]  She has met her son's girlfriend  Her daughter in law worked for her [employer] and she embezzled $68,000 and her sisters kid (nephew) and they pressed charges  Her daughter in law is with the  of her son's girlfriend  Discussion of coping skills she used during her husbands 12 year illness  She states her marriage was a loving marriage  He would cook for her and make her coffee for her so she would have it in the morning  They talked a lot  CBT was used to challenge thought distortions about her feeling bad about what her daughter in law did and did not talk to her sister  Covid has negatively impacted her daily functioning and her grieving process  She states she is lonely  When her son has the girls, he stays at her house but is going to start taking them to his apartment when he lives with his girlfriend  She wants to get out and do things  When asked what she wants to do she spoke of what she has been doing  JANUARY-7=17  PHQ-2= 8    PHQ-9=19  She sates the medicine her anxiety has lessened  She states that she was always there for her  and now no one is there for her  She is appropriately dressed in a casual manner and well groomed  Her mood is depressed as evidenced by her crying with her affect not being able to be assessed due to Covid masking  Thought process is logical and speech is coherent  Patient ID: Jose Davenport is a 61 y o  female  HPI    Review of Tomeka Stain reports that she is taking her medications as prescribed  Objective:  Junior Balderas appears to be suffering from prolonged grief which has been exacerbated by Covid  She has her kids as a support as well as her grandchildren  Her feeling responsible for the actions of others is an area of concern  Physical Exam  Mental Health: Junior Balderas denies any SI/HI or AH/VH

## 2021-06-03 ENCOUNTER — APPOINTMENT (OUTPATIENT)
Dept: LAB | Facility: CLINIC | Age: 66
End: 2021-06-03
Payer: COMMERCIAL

## 2021-06-03 DIAGNOSIS — Z11.59 ENCOUNTER FOR HEPATITIS C SCREENING TEST FOR LOW RISK PATIENT: ICD-10-CM

## 2021-06-03 DIAGNOSIS — E83.52 SERUM CALCIUM ELEVATED: ICD-10-CM

## 2021-06-03 DIAGNOSIS — E78.00 HIGH CHOLESTEROL: ICD-10-CM

## 2021-06-03 LAB
ALBUMIN SERPL BCP-MCNC: 4.1 G/DL (ref 3.5–5)
ALP SERPL-CCNC: 115 U/L (ref 46–116)
ALT SERPL W P-5'-P-CCNC: 30 U/L (ref 12–78)
ANION GAP SERPL CALCULATED.3IONS-SCNC: 3 MMOL/L (ref 4–13)
AST SERPL W P-5'-P-CCNC: 18 U/L (ref 5–45)
BILIRUB SERPL-MCNC: 0.66 MG/DL (ref 0.2–1)
BUN SERPL-MCNC: 13 MG/DL (ref 5–25)
CALCIUM SERPL-MCNC: 11.2 MG/DL (ref 8.3–10.1)
CHLORIDE SERPL-SCNC: 109 MMOL/L (ref 100–108)
CHOLEST SERPL-MCNC: 185 MG/DL (ref 50–200)
CO2 SERPL-SCNC: 29 MMOL/L (ref 21–32)
CREAT SERPL-MCNC: 0.73 MG/DL (ref 0.6–1.3)
GFR SERPL CREATININE-BSD FRML MDRD: 87 ML/MIN/1.73SQ M
GLUCOSE P FAST SERPL-MCNC: 82 MG/DL (ref 65–99)
HCV AB SER QL: NORMAL
HDLC SERPL-MCNC: 62 MG/DL
LDLC SERPL CALC-MCNC: 97 MG/DL (ref 0–100)
POTASSIUM SERPL-SCNC: 4.4 MMOL/L (ref 3.5–5.3)
PROT SERPL-MCNC: 7.6 G/DL (ref 6.4–8.2)
SODIUM SERPL-SCNC: 141 MMOL/L (ref 136–145)
TRIGL SERPL-MCNC: 130 MG/DL

## 2021-06-03 PROCEDURE — 83970 ASSAY OF PARATHORMONE: CPT

## 2021-06-03 PROCEDURE — 36415 COLL VENOUS BLD VENIPUNCTURE: CPT

## 2021-06-03 PROCEDURE — 80053 COMPREHEN METABOLIC PANEL: CPT

## 2021-06-03 PROCEDURE — 86803 HEPATITIS C AB TEST: CPT

## 2021-06-03 PROCEDURE — 80061 LIPID PANEL: CPT

## 2021-06-04 DIAGNOSIS — E83.52 SERUM CALCIUM ELEVATED: Primary | ICD-10-CM

## 2021-06-04 LAB — PTH-INTACT SERPL-MCNC: 85.3 PG/ML (ref 18.4–80.1)

## 2021-06-09 ENCOUNTER — OFFICE VISIT (OUTPATIENT)
Dept: FAMILY MEDICINE CLINIC | Facility: CLINIC | Age: 66
End: 2021-06-09
Payer: COMMERCIAL

## 2021-06-09 VITALS
TEMPERATURE: 97.3 F | DIASTOLIC BLOOD PRESSURE: 74 MMHG | HEIGHT: 63 IN | BODY MASS INDEX: 26.44 KG/M2 | OXYGEN SATURATION: 95 % | WEIGHT: 149.2 LBS | SYSTOLIC BLOOD PRESSURE: 118 MMHG | HEART RATE: 75 BPM

## 2021-06-09 DIAGNOSIS — F41.9 ANXIETY: Primary | ICD-10-CM

## 2021-06-09 DIAGNOSIS — F33.1 MODERATE EPISODE OF RECURRENT MAJOR DEPRESSIVE DISORDER (HCC): ICD-10-CM

## 2021-06-09 DIAGNOSIS — E21.3 HYPERPARATHYROIDISM (HCC): ICD-10-CM

## 2021-06-09 PROCEDURE — 3725F SCREEN DEPRESSION PERFORMED: CPT | Performed by: FAMILY MEDICINE

## 2021-06-09 PROCEDURE — 3008F BODY MASS INDEX DOCD: CPT | Performed by: FAMILY MEDICINE

## 2021-06-09 PROCEDURE — 99214 OFFICE O/P EST MOD 30 MIN: CPT | Performed by: FAMILY MEDICINE

## 2021-06-09 NOTE — PROGRESS NOTES
Assessment/Plan:       Problem List Items Addressed This Visit        Endocrine    Hyperparathyroidism ([facility] )     Refer to endo         Relevant Orders    Ambulatory referral to Endocrinology       Other    Anxiety - Primary    Moderate episode of recurrent major depressive disorder ([facility] )     She is improving  Stay on Zoloft 50 mg  Continue with therapy                 Depression Screening Follow-up Plan: Patient's depression screening was positive with a PHQ-2 score of   Their PHQ-9 score was   Continue regular follow-up with their psychologist/therapist/psychiatrist who is managing their mental health condition(s)  Subjective:      Patient ID: Viktoriya Mondragon is a 61 y o  female  61 yr old here for recheck on depression  She is on Zoloft 50 mg daily  She reports improvement in her symptoms  She denies any side effects  She is in therapy  She scored 10 points on the PHQ 9, this improved from 16 points, and originally was 19  Not suicidal     She had labs done  Her calcium was 11 2, PTH elevated  Has been elevated in the past going back to at least   She denies ever seeing an endocrinologist   Other labs reviewed and are WNL  She is on a statin & her lipids are well controlled      The following portions of the patient's history were reviewed and updated as appropriate:   She  has a past medical history of Depression and Hyperlipidemia  She   Patient Active Problem List    Diagnosis Date Noted    Moderate episode of recurrent major depressive disorder ([place] [id number] ) 2021    Benzodiazepine contract exists 2021    Anxiety 2020    Osteopenia 2018    Hyperparathyroidism ([facility] ) 2018    Hypercalcemia 2018    Post-menopausal 2018    Current every day smoker 2018    Environmental allergies 2018    High cholesterol 2014     She  has a past surgical history that includes  section    Her family history includes Cancer in her father, mother, and sister  She  reports that she has been smoking  She has been smoking about 0 50 packs per day  She has never used smokeless tobacco  She reports that she does not drink alcohol or use drugs  Current Outpatient Medications   Medication Sig Dispense Refill    atorvastatin (LIPITOR) 20 mg tablet Take 1 tablet (20 mg total) by mouth daily 90 tablet 3    Cholecalciferol (EQL VITAMIN D3) 2000 units CAPS Take by mouth daily      fluticasone (FLONASE) 50 mcg/act nasal spray 2 sprays into each nostril daily      LORazepam (ATIVAN) 0 5 mg tablet TAKE ONE TABLET BY MOUTH EVERY 8 HOURS AS NEEDED FOR ANXIETY 30 tablet 0    sertraline (ZOLOFT) 50 mg tablet Take 1 tablet (50 mg total) by mouth daily 90 tablet 0    timolol (TIMOPTIC) 0 5 % ophthalmic solution        No current facility-administered medications for this visit  Current Outpatient Medications on File Prior to Visit   Medication Sig    atorvastatin (LIPITOR) 20 mg tablet Take 1 tablet (20 mg total) by mouth daily    Cholecalciferol (EQL VITAMIN D3) 2000 units CAPS Take by mouth daily    fluticasone (FLONASE) 50 mcg/act nasal spray 2 sprays into each nostril daily    LORazepam (ATIVAN) 0 5 mg tablet TAKE ONE TABLET BY MOUTH EVERY 8 HOURS AS NEEDED FOR ANXIETY    sertraline (ZOLOFT) 50 mg tablet Take 1 tablet (50 mg total) by mouth daily    timolol (TIMOPTIC) 0 5 % ophthalmic solution      No current facility-administered medications on file prior to visit  She is allergic to sulfa antibiotics       Review of Systems   Constitutional: Negative for activity change  Respiratory: Negative for chest tightness and shortness of breath  Cardiovascular: Negative for chest pain and leg swelling  Neurological: Negative for headaches  Psychiatric/Behavioral: Positive for dysphoric mood and sleep disturbance  Negative for suicidal ideas  The patient is not nervous/anxious            Objective:      /74   Pulse 75   Temp (!) 97 3 °F (36 3 °C) (Temporal)   Ht 5' 3" (1 6 m)   Wt 67 7 kg (149 lb 3 2 oz)   SpO2 95%   BMI 26 43 kg/m²          Physical Exam  Vitals signs and nursing note reviewed  Constitutional:       General: She is not in acute distress  Appearance: Normal appearance  She is not ill-appearing, toxic-appearing or diaphoretic  HENT:      Head: Normocephalic and atraumatic  Right Ear: External ear normal       Left Ear: External ear normal    Cardiovascular:      Rate and Rhythm: Normal rate and regular rhythm  Heart sounds: No murmur  No friction rub  Pulmonary:      Effort: Pulmonary effort is normal  No respiratory distress  Breath sounds: Normal breath sounds  No stridor  No wheezing, rhonchi or rales  Musculoskeletal:         General: No swelling  Right lower leg: No edema  Left lower leg: No edema  Neurological:      General: No focal deficit present  Mental Status: She is alert  Mental status is at baseline  Psychiatric:         Attention and Perception: Attention normal          Mood and Affect: Mood normal          Speech: Speech normal          Behavior: Behavior normal          Thought Content:  Thought content normal          Judgment: Judgment normal

## 2021-06-11 ENCOUNTER — SOCIAL WORK (OUTPATIENT)
Dept: BEHAVIORAL/MENTAL HEALTH CLINIC | Facility: CLINIC | Age: 66
End: 2021-06-11
Payer: COMMERCIAL

## 2021-06-11 DIAGNOSIS — F33.1 MDD (MAJOR DEPRESSIVE DISORDER), RECURRENT EPISODE, MODERATE (HCC): Primary | ICD-10-CM

## 2021-06-11 PROCEDURE — 90834 PSYTX W PT 45 MINUTES: CPT | Performed by: SOCIAL WORKER
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
## 2021-07-09 ENCOUNTER — SOCIAL WORK (OUTPATIENT)
Dept: BEHAVIORAL/MENTAL HEALTH CLINIC | Facility: CLINIC | Age: 66
End: 2021-07-09
Payer: COMMERCIAL

## 2021-07-09 DIAGNOSIS — F33.1 MDD (MAJOR DEPRESSIVE DISORDER), RECURRENT EPISODE, MODERATE (HCC): Primary | ICD-10-CM

## 2021-07-09 PROCEDURE — 90834 PSYTX W PT 45 MINUTES: CPT | Performed by: SOCIAL WORKER
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
## 2021-07-20 ENCOUNTER — VBI (OUTPATIENT)
Dept: ADMINISTRATIVE | Facility: OTHER | Age: 66
End: 2021-07-20

## 2021-08-11 ENCOUNTER — CONSULT (OUTPATIENT)
Dept: ENDOCRINOLOGY | Facility: CLINIC | Age: 66
End: 2021-08-11
Payer: COMMERCIAL

## 2021-08-11 VITALS
HEART RATE: 59 BPM | WEIGHT: 149.6 LBS | SYSTOLIC BLOOD PRESSURE: 110 MMHG | OXYGEN SATURATION: 99 % | BODY MASS INDEX: 26.51 KG/M2 | HEIGHT: 63 IN | DIASTOLIC BLOOD PRESSURE: 70 MMHG

## 2021-08-11 DIAGNOSIS — E21.3 HYPERPARATHYROIDISM (HCC): Primary | ICD-10-CM

## 2021-08-11 DIAGNOSIS — M85.88 OSTEOPENIA OF SPINE: ICD-10-CM

## 2021-08-11 PROCEDURE — 3008F BODY MASS INDEX DOCD: CPT | Performed by: NURSE PRACTITIONER

## 2021-08-11 PROCEDURE — 99244 OFF/OP CNSLTJ NEW/EST MOD 40: CPT | Performed by: NURSE PRACTITIONER

## 2021-08-11 NOTE — PATIENT INSTRUCTIONS
1  Drink more water  2  Get lab work done in the next few weeks  3  Get DEXA scan done  4  I'll let you know how everything looks and if we need to discuss treatment for osteoporosis

## 2021-08-11 NOTE — PROGRESS NOTES
New Patient Progress Note       Chief Complaint   Patient presents with    Hyperparathyroidism      Referred by Dr Brandi Bui MD     History of Present Illness:     Chas Webb is a 61 y o  female with HLD, Vit D deficiency, and hyperparathyroidism presenting to the office today to re-establish care  Patient had been seen by endocrinology in 2018 for a workup of hyperparathyroidism  At that time, she was found to be deficient in Vit D and DEXA scan demonstrated low bone density  24 hour urine was unremarkable  PTH and serum calcium normalized for a time  On last lab work from 6/3/2021, serum Ca+ was noted to be elevated to 11 2 mg/dL  Component      Latest Ref Rng & Units 2018 10/12/2018 3/11/2020 6/3/2021   Calcium      8 3 - 10 1 mg/dL 10 3 (H) 9 7 10 4 (H) 11 2 (H)     Symptoms of hypercalcemia:  Polydipsia/polyuria: -  Fatigue: +  H/o kidney stones: -  Flank pain/dysuria: -  Decreased appetite: -  Constipation: -  Bone pain: -  Fragility fractures: -  Osteoporosis: Low bone density    Ca+ consumption:  Supplements: Taking 2,000 iu of Vit D daily  Diet: Does not consume dairy  Does eat vegetables, legumes, and fish  Denies use of Calcium containing antacids    Patient states that she makes an effort to remain well hydrated but has not been eating or drinking as much lately due to depression  Her  passed away one year ago  She is seeing a counselor and taking an SSRI at present      Patient Active Problem List   Diagnosis    High cholesterol    Current every day smoker    Environmental allergies    [facility]    Hypercalcemia    Post-menopausal    Osteopenia    Anxiety    Benzodiazepine contract exists    Moderate episode of recurrent major depressive disorder ([facility] [address] )      Past Medical History:   Diagnosis Date    Depression     Hyperlipidemia     last assessed -       Past Surgical History:   Procedure Laterality Date     SECTION Family History   Problem Relation Age of Onset    Cancer Mother     Cancer Father     Cancer Sister      Social History     Tobacco Use    Smoking status: Current Every Day Smoker     Packs/day: 0 50    Smokeless tobacco: Never Used   Substance Use Topics    Alcohol use: No     Allergies   Allergen Reactions    Sulfa Antibiotics        Current Outpatient Medications:     atorvastatin (LIPITOR) 20 mg tablet, Take 1 tablet (20 mg total) by mouth daily, Disp: 90 tablet, Rfl: 3    Cholecalciferol (EQL VITAMIN D3) 2000 units CAPS, Take by mouth daily, Disp: , Rfl:     fluticasone (FLONASE) 50 mcg/act nasal spray, 2 sprays into each nostril daily, Disp: , Rfl:     LORazepam (ATIVAN) 0 5 mg tablet, TAKE ONE TABLET BY MOUTH EVERY 8 HOURS AS NEEDED FOR ANXIETY, Disp: 30 tablet, Rfl: 0    sertraline (ZOLOFT) 50 mg tablet, Take 1 tablet (50 mg total) by mouth daily, Disp: 90 tablet, Rfl: 0    timolol (TIMOPTIC) 0 5 % ophthalmic solution, , Disp: , Rfl:     Review of Systems   Constitutional: Positive for fatigue  Negative for activity change, appetite change and unexpected weight change  HENT: Negative for sore throat, trouble swallowing and voice change  Eyes: Negative for visual disturbance  Respiratory: Negative for cough, chest tightness and shortness of breath  Cardiovascular: Negative for chest pain, palpitations and leg swelling  Gastrointestinal: Negative for constipation, diarrhea, nausea and vomiting  Endocrine: Negative for cold intolerance, heat intolerance, polydipsia and polyuria  Genitourinary: Negative for flank pain, frequency and hematuria  Musculoskeletal: Negative for arthralgias, back pain, gait problem, joint swelling and myalgias  Skin: Negative for wound  Allergic/Immunologic: Positive for environmental allergies  Negative for food allergies  Neurological: Negative for dizziness, weakness, light-headedness, numbness and headaches     Hematological: Does not bruise/bleed easily  Psychiatric/Behavioral: Positive for dysphoric mood  Negative for confusion, decreased concentration and sleep disturbance  The patient is nervous/anxious  Physical Exam:  Body mass index is 26 5 kg/m²  /70 (BP Location: Left arm, Cuff Size: Adult)   Pulse 59   Ht 5' 3" (1 6 m)   Wt 67 9 kg (149 lb 9 6 oz)   SpO2 99%   BMI 26 50 kg/m²    Wt Readings from Last 3 Encounters:   08/11/21 67 9 kg (149 lb 9 6 oz)   06/09/21 67 7 kg (149 lb 3 2 oz)   05/12/21 67 9 kg (149 lb 9 6 oz)       Physical Exam  Vitals reviewed  Constitutional:       General: She is not in acute distress  Appearance: She is well-developed  She is not ill-appearing  HENT:      Head: Normocephalic and atraumatic  Comments: Mask in place  Eyes:      Pupils: Pupils are equal, round, and reactive to light  Neck:      Thyroid: No thyromegaly  Cardiovascular:      Rate and Rhythm: Normal rate and regular rhythm  Pulses: Normal pulses  Heart sounds: Normal heart sounds  Pulmonary:      Effort: Pulmonary effort is normal       Breath sounds: Normal breath sounds  Abdominal:      General: Bowel sounds are normal  There is no distension  Palpations: Abdomen is soft  Tenderness: There is no abdominal tenderness  Musculoskeletal:         General: No tenderness or signs of injury  Cervical back: Normal range of motion and neck supple  Right lower leg: No edema  Left lower leg: No edema  Lymphadenopathy:      Cervical: No cervical adenopathy  Skin:     General: Skin is warm and dry  Capillary Refill: Capillary refill takes less than 2 seconds  Neurological:      Mental Status: She is alert and oriented to person, place, and time        Gait: Gait normal    Psychiatric:      Comments: Tearful, anxious         Labs:       Lab Results   Component Value Date    CREATININE 0 73 06/03/2021    CREATININE 0 75 03/11/2020    CREATININE 0 73 10/12/2018    BUN 13 06/03/2021     11/28/2014    K 4 4 06/03/2021     (H) 06/03/2021    CO2 29 06/03/2021     eGFR   Date Value Ref Range Status   06/03/2021 87 ml/min/1 73sq m Final       Lab Results   Component Value Date    CHOL 162 11/28/2014    HDL 62 06/03/2021    TRIG 130 06/03/2021       Lab Results   Component Value Date    ALT 30 06/03/2021    AST 18 06/03/2021    ALKPHOS 115 06/03/2021    BILITOT 0 80 11/28/2014       Lab Results   Component Value Date    FREET4 1 02 10/12/2018         Impression & Plan:    Problem List Items Addressed This Visit        Endocrine    Hyperparathyroidism ([facility] [address] ) - Primary     Reviewed pathophysiology of hyperparathyroidism  Reviewed various treatment options including surveillance, medication, and surgery  At this time, given that patient is asymptomatic, recommend continued surveillance  Impressed upon the patient the importance of remaining well hydrated  Repeat CMP, PTH, and Vit D  Check DEXA scan  Relevant Orders    Comprehensive metabolic panel Lab Collect    PTH, intact Lab Collect Lab Collect    Vitamin D 25 hydroxy Lab Collect    DXA bone density spine hip and pelvis       Musculoskeletal and Integument    Osteopenia     Check DEXA scan  Briefly discussed treatment options for osteoporosis  Should medication be warranted, we will discuss again after DEXA scan is reviewed  In the meantime, reviewed the importance of weight bearing exercise and fall prevention  Orders Placed This Encounter   Procedures    DXA bone density spine hip and pelvis     L forearm, please     Standing Status:   Future     Standing Expiration Date:   8/11/2025     Scheduling Instructions:      Please wear comfortable clothing with no metal buttons, zippers, snaps or an underwire bra  Do not take any calcium supplements 24 hours prior to your test  Please bring your insurance cards, a form of photo ID and a list of your medications with you   Arrive 5-10 minutes prior to your appointment time in order to register  Your study cannot be performed if you take your calcium supplement 24 hours before the scheduled Dexa scan examination  To schedule this appointment, please contact Central Scheduling at [phone]  Order Specific Question:   Reason for Exam:     Answer:   hypeparathyroidism, postmenopausal, smoker    Comprehensive metabolic panel Lab Collect     This is a patient instruction: Patient fasting for 8 hours or longer recommended  Standing Status:   Future     Standing Expiration Date:   8/11/2022    PTH, intact Lab Collect Lab Collect     Standing Status:   Future     Standing Expiration Date:   8/11/2022    Vitamin D 25 hydroxy Lab Collect     Standing Status:   Future     Standing Expiration Date:   8/11/2022       Patient Instructions   1  Drink more water  2  Get lab work done in the next few weeks  3  Get DEXA scan done  4  I'll let you know how everything looks and if we need to discuss treatment for osteoporosis  Discussed with the patient and all questioned fully answered  She will call me if any problems arise  Follow-up appointment in 6 months       Counseled patient on diagnostic results, prognosis, risk and benefit of treatment options, instruction for management, importance of treatment compliance, Risk  factor reduction and impressions      Jahaira Olivarez, ARMANDONP

## 2021-08-11 NOTE — ASSESSMENT & PLAN NOTE
Check DEXA scan  Briefly discussed treatment options for osteoporosis  Should medication be warranted, we will discuss again after DEXA scan is reviewed  In the meantime, reviewed the importance of weight bearing exercise and fall prevention

## 2021-08-11 NOTE — ASSESSMENT & PLAN NOTE
Reviewed pathophysiology of hyperparathyroidism  Reviewed various treatment options including surveillance, medication, and surgery  At this time, given that patient is asymptomatic, recommend continued surveillance  Impressed upon the patient the importance of remaining well hydrated  Repeat CMP, PTH, and Vit D  Check DEXA scan

## 2021-08-19 ENCOUNTER — SOCIAL WORK (OUTPATIENT)
Dept: BEHAVIORAL/MENTAL HEALTH CLINIC | Facility: CLINIC | Age: 66
End: 2021-08-19
Payer: COMMERCIAL

## 2021-08-19 DIAGNOSIS — F33.1 MODERATE EPISODE OF RECURRENT MAJOR DEPRESSIVE DISORDER (HCC): ICD-10-CM

## 2021-08-19 DIAGNOSIS — F33.1 MDD (MAJOR DEPRESSIVE DISORDER), RECURRENT EPISODE, MODERATE (HCC): Primary | ICD-10-CM

## 2021-08-19 PROCEDURE — 90834 PSYTX W PT 45 MINUTES: CPT | Performed by: SOCIAL WORKER
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
## 2021-09-13 ENCOUNTER — OFFICE VISIT (OUTPATIENT)
Dept: FAMILY MEDICINE CLINIC | Facility: CLINIC | Age: 66
End: 2021-09-13
Payer: COMMERCIAL

## 2021-09-13 VITALS
HEART RATE: 70 BPM | WEIGHT: 146 LBS | SYSTOLIC BLOOD PRESSURE: 118 MMHG | TEMPERATURE: 97.9 F | BODY MASS INDEX: 25.87 KG/M2 | DIASTOLIC BLOOD PRESSURE: 83 MMHG | HEIGHT: 63 IN | OXYGEN SATURATION: 98 %

## 2021-09-13 DIAGNOSIS — Z12.31 ENCOUNTER FOR SCREENING MAMMOGRAM FOR MALIGNANT NEOPLASM OF BREAST: ICD-10-CM

## 2021-09-13 DIAGNOSIS — E78.00 HIGH CHOLESTEROL: ICD-10-CM

## 2021-09-13 DIAGNOSIS — F33.1 MODERATE EPISODE OF RECURRENT MAJOR DEPRESSIVE DISORDER (HCC): ICD-10-CM

## 2021-09-13 DIAGNOSIS — F41.9 ANXIETY: ICD-10-CM

## 2021-09-13 DIAGNOSIS — Z00.00 ANNUAL PHYSICAL EXAM: Primary | ICD-10-CM

## 2021-09-13 PROCEDURE — 4004F PT TOBACCO SCREEN RCVD TLK: CPT | Performed by: FAMILY MEDICINE

## 2021-09-13 PROCEDURE — 3725F SCREEN DEPRESSION PERFORMED: CPT | Performed by: FAMILY MEDICINE

## 2021-09-13 PROCEDURE — 3008F BODY MASS INDEX DOCD: CPT | Performed by: FAMILY MEDICINE

## 2021-09-13 PROCEDURE — 99396 PREV VISIT EST AGE 40-64: CPT | Performed by: FAMILY MEDICINE

## 2021-09-13 RX ORDER — ATORVASTATIN CALCIUM 20 MG/1
20 TABLET, FILM COATED ORAL DAILY
Qty: 90 TABLET | Refills: 3 | Status: SHIPPED | OUTPATIENT
Start: 2021-09-13

## 2021-09-13 RX ORDER — LORAZEPAM 0.5 MG/1
0.5 TABLET ORAL EVERY 8 HOURS PRN
Qty: 30 TABLET | Refills: 0 | Status: SHIPPED | OUTPATIENT
Start: 2021-09-13

## 2021-09-13 NOTE — PROGRESS NOTES
[id number] [address] [place] [address]    NAME: Simon Major  AGE: 61 y o  SEX: female  : 1955     DATE: 2021     Assessment and Plan:     Problem List Items Addressed This Visit        Other    High cholesterol    Relevant Medications    atorvastatin (LIPITOR) 20 mg tablet    Anxiety    Relevant Medications    LORazepam (ATIVAN) 0 5 mg tablet    Moderate episode of recurrent major depressive disorder (HCC)    Relevant Medications    LORazepam (ATIVAN) 0 5 mg tablet      Other Visit Diagnoses     Annual physical exam    -  Primary    Encounter for screening mammogram for malignant neoplasm of breast        Relevant Orders    Mammo screening bilateral w cad          Immunizations and preventive care screenings were discussed with patient today  Appropriate education was printed on patient's after visit summary  Counseling:  Alcohol/drug use: discussed moderation in alcohol intake, the recommendations for healthy alcohol use, and avoidance of illicit drug use  Dental Health: discussed importance of regular tooth brushing, flossing, and dental visits  · Exercise: the importance of regular exercise/physical activity was discussed  Recommend exercise 3-5 times per week for at least 30 minutes  BMI Counseling: Body mass index is 25 86 kg/m²  The BMI is above normal  Nutrition recommendations include encouraging healthy choices of fruits and vegetables  Exercise recommendations include exercising 3-5 times per week  Return in about 6 months (around 3/13/2022) for Recheck  Chief Complaint:     Chief Complaint   Patient presents with    Annual Exam      History of Present Illness:     Adult Annual Physical   Patient here for a comprehensive physical exam  The patient reports no problems  Needs medication refills  Anxiety - on Sertraline and taking Lorazepam as needed  She is in therapy   She has a contract for Lorazepam  [facility] reviewed  Diet and Physical Activity  · Diet/Nutrition: well balanced diet  Fruits/veggies  · Exercise: walking  Depression Screening  PHQ-9 Depression Screening    PHQ-9:   Frequency of the following problems over the past two weeks:      Little interest or pleasure in doing things: 0 - not at all  Feeling down, depressed, or hopeless: 0 - not at all  Trouble falling or staying asleep, or sleeping too much: 0 - not at all  Feeling tired or having little energy: 0 - not at all  Poor appetite or overeatin - not at all  Feeling bad about yourself - or that you are a failure or have let yourself or your family down: 0 - not at all  Trouble concentrating on things, such as reading the newspaper or watching television: 0 - not at all  Moving or speaking so slowly that other people could have noticed  Or the opposite - being so fidgety or restless that you have been moving around a lot more than usual: 0 - not at all  Thoughts that you would be better off dead, or of hurting yourself in some way: 0 - not at all  PHQ-2 Score: 0  PHQ-9 Score: 0       General Health  · Sleep: sleeps poorly  Wakes up frequently  · Hearing: no issues  · Vision: goes for regular eye exams and wears glasses  · Dental: regular dental visits and brushes teeth twice daily  /GYN Health  · Patient is: postmenopausal  · Mammogram is due     CRC screening - FIT test sent to her by insurance      Review of Systems:     Review of Systems   Constitutional: Negative for activity change, appetite change, chills, fatigue, fever and unexpected weight change  HENT: Negative for congestion, ear discharge, ear pain, postnasal drip, sinus pressure and sore throat  Eyes: Negative for discharge and visual disturbance  Respiratory: Negative for cough, shortness of breath and wheezing  Cardiovascular: Negative for chest pain, palpitations and leg swelling     Gastrointestinal: Negative for abdominal pain, constipation, diarrhea, nausea and vomiting  Endocrine: Negative for cold intolerance, heat intolerance, polydipsia and polyuria  Genitourinary: Negative for difficulty urinating and frequency  Musculoskeletal: Negative for arthralgias, back pain, joint swelling and myalgias  Skin: Negative for rash  Neurological: Negative for dizziness, weakness, light-headedness, numbness and headaches  Hematological: Negative for adenopathy  Psychiatric/Behavioral: Positive for dysphoric mood and sleep disturbance  Negative for behavioral problems, confusion and suicidal ideas  The patient is nervous/anxious  Past Medical History:     Past Medical History:   Diagnosis Date    Depression     Hyperlipidemia     last assessed - 2016      Past Surgical History:     Past Surgical History:   Procedure Laterality Date     SECTION        Social History:     Social History     Socioeconomic History    Marital status: /Civil Union     Spouse name: None    Number of children: None    Years of education: None    Highest education level: None   Occupational History    None   Tobacco Use    Smoking status: Current Every Day Smoker     Packs/day: 0 50     Years: 30 00     Pack years: 15 00     Types: Cigarettes    Smokeless tobacco: Never Used   Vaping Use    Vaping Use: Never used   Substance and Sexual Activity    Alcohol use: No    Drug use: No    Sexual activity: None   Other Topics Concern    None   Social History Narrative    Stress at home     Social Determinants of Health     Financial Resource Strain:     Difficulty of Paying Living Expenses:    Food Insecurity:     Worried About Running Out of Food in the Last Year:     Ran Out of Food in the Last Year:    Transportation Needs:     Lack of Transportation (Medical):      Lack of Transportation (Non-Medical):    Physical Activity:     Days of Exercise per Week:     Minutes of Exercise per Session:    Stress:     Feeling of Stress :    Social Connections:     Frequency of Communication with Friends and Family:     Frequency of Social Gatherings with Friends and Family:     Attends Sabianism Services:     Active Member of Clubs or Organizations:     Attends Club or Organization Meetings:     Marital Status:    Intimate Partner Violence:     Fear of Current or Ex-Partner:     Emotionally Abused:     Physically Abused:     Sexually Abused:       Family History:     Family History   Problem Relation Age of Onset    Cancer Mother     Cancer Father     Cancer Sister       Current Medications:     Current Outpatient Medications   Medication Sig Dispense Refill    atorvastatin (LIPITOR) 20 mg tablet Take 1 tablet (20 mg total) by mouth daily 90 tablet 3    Cholecalciferol (EQL VITAMIN D3) 2000 units CAPS Take by mouth daily      fluticasone (FLONASE) 50 mcg/act nasal spray 2 sprays into each nostril daily      LORazepam (ATIVAN) 0 5 mg tablet Take 1 tablet (0 5 mg total) by mouth every 8 (eight) hours as needed for anxiety 30 tablet 0    sertraline (ZOLOFT) 50 mg tablet Take 1 tablet (50 mg total) by mouth daily 90 tablet 0    timolol (TIMOPTIC) 0 5 % ophthalmic solution        No current facility-administered medications for this visit  Allergies: Allergies   Allergen Reactions    Sulfa Antibiotics       Physical Exam:     /83   Pulse 70   Temp 97 9 °F (36 6 °C)   Ht 5' 3" (1 6 m)   Wt 66 2 kg (146 lb)   SpO2 98%   BMI 25 86 kg/m²     Physical Exam  Constitutional:       General: She is not in acute distress  Appearance: Normal appearance  She is well-developed  She is not ill-appearing, toxic-appearing or diaphoretic  HENT:      Head: Normocephalic and atraumatic  Right Ear: Tympanic membrane, ear canal and external ear normal       Left Ear: Tympanic membrane, ear canal and external ear normal       Mouth/Throat:      Pharynx: No oropharyngeal exudate  Eyes:      General: No scleral icterus          Right eye: No discharge  Left eye: No discharge  Conjunctiva/sclera: Conjunctivae normal       Pupils: Pupils are equal, round, and reactive to light  Neck:      Thyroid: No thyromegaly  Cardiovascular:      Rate and Rhythm: Normal rate and regular rhythm  Heart sounds: Normal heart sounds  No murmur heard  No friction rub  No gallop  Pulmonary:      Effort: Pulmonary effort is normal  No respiratory distress  Breath sounds: Normal breath sounds  No wheezing or rales  Chest:      Chest wall: No tenderness  Musculoskeletal:         General: No swelling  Lymphadenopathy:      Cervical: No cervical adenopathy  Skin:     General: Skin is warm  Findings: No rash  Neurological:      General: No focal deficit present  Mental Status: She is alert and oriented to person, place, and time  Mental status is at baseline  Cranial Nerves: No cranial nerve deficit  Psychiatric:         Mood and Affect: Mood is depressed  Affect is tearful  Behavior: Behavior normal          Thought Content:  Thought content normal          Judgment: Judgment normal           Aziza Heart MD  [address] [place] [address] 15

## 2021-09-13 NOTE — PATIENT INSTRUCTIONS

## 2021-09-16 ENCOUNTER — SOCIAL WORK (OUTPATIENT)
Dept: BEHAVIORAL/MENTAL HEALTH CLINIC | Facility: CLINIC | Age: 66
End: 2021-09-16
Payer: COMMERCIAL

## 2021-09-16 DIAGNOSIS — F41.9 ANXIETY: Primary | ICD-10-CM

## 2021-09-16 DIAGNOSIS — F33.1 MDD (MAJOR DEPRESSIVE DISORDER), RECURRENT EPISODE, MODERATE (HCC): ICD-10-CM

## 2021-09-16 PROCEDURE — 90834 PSYTX W PT 45 MINUTES: CPT | Performed by: SOCIAL WORKER

## 2021-09-16 NOTE — PSYCH
Start Time 1:00PM-1:45PM  Assessment/Plan: Therapy for moderate episode of recurrent major depressive disorder     There are no diagnoses linked to this encounter  Subjective:  Keith Chavez reports that she has been going shopping and eating out and has not met any people  She is trying to meet someone to do things with  She spoke of going to a fair with her son and granddaughters  She is going to go away with her sisters But there is still tension due to her daughter in law  She then stated that her brother passed away 08/31/2021, her brother was a year younger than her  She reports that she only takes her Ativan at night, as she needs to go to sleep as she still works part time  She feels that going to bed at night is the hardest thing as he is not there  She has tried using a body pillow and she hugs his urn every night and she said someone at work told her not to do that  Keith Chavez is appropriately dressed and groomed  Her mood is anxious and her affect cannot be assessed due to Covid masking  Her thought process is normal and speech is coherent and logical   She cried when she said that "life went so fast "  She treats everyone like they are her mother as she went for her CNA after her mother passed from cancer  She is getting out and she states that the [employer] runs half price on Wednesday's so she is getting out of the house, as she also like to go with her friend to the [employer]  She relived her 's illness  He did have a 12 year mahan with cancer  She was told by his doctor that it was not the cancer that killed him, it was the treatment  She was able to discuss that she is trying to go through things and get rid of things  She agrees to go to a [employer] dinner or breakfast where she can mingle and meet people  Support was offered throughout the session  Patient ID: Amarjit Baird is a 61 y o  female      HPI    Review of Giuliano Stevens reports taking her medications as prescribed,    Objective:  Jennifer Davis appears to be making some progress as evidenced by her getting out of the house  She is still grieving and it has been a year and a half  Mixed messages are sent as she kisses his urn every night but wants to meet someone else  Her kids and sisters are supportive       Physical Exam  Mental Health[de-identified] Jennifer Davis denies any SI/HI or AH/VH

## 2021-10-14 ENCOUNTER — SOCIAL WORK (OUTPATIENT)
Dept: BEHAVIORAL/MENTAL HEALTH CLINIC | Facility: CLINIC | Age: 66
End: 2021-10-14
Payer: COMMERCIAL

## 2021-10-14 DIAGNOSIS — F33.1 MDD (MAJOR DEPRESSIVE DISORDER), RECURRENT EPISODE, MODERATE (HCC): Primary | ICD-10-CM

## 2021-10-14 PROCEDURE — 90834 PSYTX W PT 45 MINUTES: CPT | Performed by: SOCIAL WORKER

## 2021-11-11 ENCOUNTER — TELEPHONE (OUTPATIENT)
Dept: ADMINISTRATIVE | Facility: OTHER | Age: 66
End: 2021-11-11

## 2021-11-11 ENCOUNTER — SOCIAL WORK (OUTPATIENT)
Dept: BEHAVIORAL/MENTAL HEALTH CLINIC | Facility: CLINIC | Age: 66
End: 2021-11-11
Payer: COMMERCIAL

## 2021-11-11 DIAGNOSIS — F41.9 ANXIETY: ICD-10-CM

## 2021-11-11 DIAGNOSIS — F33.1 MODERATE EPISODE OF RECURRENT MAJOR DEPRESSIVE DISORDER (HCC): ICD-10-CM

## 2021-11-11 DIAGNOSIS — F43.29 PROLONGED GRIEF REACTION: ICD-10-CM

## 2021-11-11 DIAGNOSIS — F33.1 MDD (MAJOR DEPRESSIVE DISORDER), RECURRENT EPISODE, MODERATE (HCC): Primary | ICD-10-CM

## 2021-11-11 PROCEDURE — 90834 PSYTX W PT 45 MINUTES: CPT | Performed by: SOCIAL WORKER

## 2021-11-15 ENCOUNTER — HOSPITAL ENCOUNTER (OUTPATIENT)
Dept: BONE DENSITY | Facility: HOSPITAL | Age: 66
Discharge: HOME | End: 2021-11-15
Payer: COMMERCIAL

## 2021-11-15 DIAGNOSIS — E21.3 HYPERPARATHYROIDISM (HCC): ICD-10-CM

## 2021-11-15 PROCEDURE — 77080 DXA BONE DENSITY AXIAL: CPT

## 2021-11-17 DIAGNOSIS — M81.8 OTHER OSTEOPOROSIS WITHOUT CURRENT PATHOLOGICAL FRACTURE: Primary | ICD-10-CM

## 2021-11-17 RX ORDER — ALENDRONATE SODIUM 70 MG/1
70 TABLET ORAL
Qty: 12 TABLET | Refills: 0 | Status: SHIPPED | OUTPATIENT
Start: 2021-11-17 | End: 2022-02-17 | Stop reason: SDUPTHER

## 2021-11-24 ENCOUNTER — APPOINTMENT (OUTPATIENT)
Dept: LAB | Facility: CLINIC | Age: 66
End: 2021-11-24
Payer: COMMERCIAL

## 2021-11-24 DIAGNOSIS — E21.3 HYPERPARATHYROIDISM (HCC): ICD-10-CM

## 2021-11-24 LAB
25(OH)D3 SERPL-MCNC: 38.9 NG/ML (ref 30–100)
ALBUMIN SERPL BCP-MCNC: 3.8 G/DL (ref 3.5–5)
ALP SERPL-CCNC: 104 U/L (ref 46–116)
ALT SERPL W P-5'-P-CCNC: 30 U/L (ref 12–78)
ANION GAP SERPL CALCULATED.3IONS-SCNC: 4 MMOL/L (ref 4–13)
AST SERPL W P-5'-P-CCNC: 17 U/L (ref 5–45)
BILIRUB SERPL-MCNC: 0.81 MG/DL (ref 0.2–1)
BUN SERPL-MCNC: 16 MG/DL (ref 5–25)
CALCIUM SERPL-MCNC: 11 MG/DL (ref 8.3–10.1)
CHLORIDE SERPL-SCNC: 109 MMOL/L (ref 100–108)
CO2 SERPL-SCNC: 27 MMOL/L (ref 21–32)
CREAT SERPL-MCNC: 0.72 MG/DL (ref 0.6–1.3)
GFR SERPL CREATININE-BSD FRML MDRD: 89 ML/MIN/1.73SQ M
GLUCOSE P FAST SERPL-MCNC: 83 MG/DL (ref 65–99)
POTASSIUM SERPL-SCNC: 4.1 MMOL/L (ref 3.5–5.3)
PROT SERPL-MCNC: 7.2 G/DL (ref 6.4–8.2)
PTH-INTACT SERPL-MCNC: 103.7 PG/ML (ref 18.4–80.1)
SODIUM SERPL-SCNC: 140 MMOL/L (ref 136–145)

## 2021-11-24 PROCEDURE — 82306 VITAMIN D 25 HYDROXY: CPT

## 2021-11-24 PROCEDURE — 36415 COLL VENOUS BLD VENIPUNCTURE: CPT

## 2021-11-24 PROCEDURE — 80053 COMPREHEN METABOLIC PANEL: CPT

## 2021-11-24 PROCEDURE — 83970 ASSAY OF PARATHORMONE: CPT

## 2021-11-29 ENCOUNTER — HOSPITAL ENCOUNTER (OUTPATIENT)
Dept: MAMMOGRAPHY | Facility: HOSPITAL | Age: 66
Discharge: HOME | End: 2021-11-29
Payer: COMMERCIAL

## 2021-11-29 VITALS — HEIGHT: 63 IN | WEIGHT: 146 LBS | BODY MASS INDEX: 25.87 KG/M2

## 2021-11-29 DIAGNOSIS — Z12.31 ENCOUNTER FOR SCREENING MAMMOGRAM FOR MALIGNANT NEOPLASM OF BREAST: ICD-10-CM

## 2021-11-29 PROCEDURE — 77067 SCR MAMMO BI INCL CAD: CPT

## 2021-11-29 PROCEDURE — 77063 BREAST TOMOSYNTHESIS BI: CPT

## 2021-12-16 ENCOUNTER — SOCIAL WORK (OUTPATIENT)
Dept: BEHAVIORAL/MENTAL HEALTH CLINIC | Facility: CLINIC | Age: 66
End: 2021-12-16
Payer: COMMERCIAL

## 2021-12-16 DIAGNOSIS — F43.29 GRIEF REACTION WITH PROLONGED BEREAVEMENT: ICD-10-CM

## 2021-12-16 DIAGNOSIS — F33.1 MODERATE EPISODE OF RECURRENT MAJOR DEPRESSIVE DISORDER (HCC): Primary | ICD-10-CM

## 2021-12-16 PROCEDURE — 90834 PSYTX W PT 45 MINUTES: CPT | Performed by: SOCIAL WORKER

## 2022-01-14 ENCOUNTER — VBI (OUTPATIENT)
Dept: ADMINISTRATIVE | Facility: OTHER | Age: 67
End: 2022-01-14

## 2022-01-20 ENCOUNTER — SOCIAL WORK (OUTPATIENT)
Dept: BEHAVIORAL/MENTAL HEALTH CLINIC | Facility: CLINIC | Age: 67
End: 2022-01-20
Payer: COMMERCIAL

## 2022-01-20 DIAGNOSIS — F41.9 ANXIETY: Primary | ICD-10-CM

## 2022-01-20 DIAGNOSIS — F33.1 MODERATE EPISODE OF RECURRENT MAJOR DEPRESSIVE DISORDER (HCC): ICD-10-CM

## 2022-01-20 PROCEDURE — 90834 PSYTX W PT 45 MINUTES: CPT | Performed by: SOCIAL WORKER

## 2022-01-20 NOTE — PSYCH
Start Time 2:00PM-2:45PM  Psychotherapy Provided: Individual Psychotherapy 45 minutes     Length of time in session: 45 minutes, follow up in  4  week    No diagnosis found  Anxiety and depression  Goals addressed in session: Goal 1       Pain:  N/A    none  0    Current suicide risk : Low     D: Jacquie Russell is worried about her granddaughter who is going to be 15 and she is going through a lot with the divorce  Her mother is facing charges from [address] from Debbi's nephew who she worked for  She has been seeing her granddaughters a little more often due to the younger one having basketball  She is missing her  and described the weather keeping her inside and isolated  She states that going to [employer] has been getting her out as well as her work  She says sometimes she gets overwhelmed at work and she is old school and she goes there to work  She states that she wishes she could work more but her arthritis is telling her no  She states that she is going on Medicare in May  She is unsure of whether to trust people or not  Jacquie Russell is hoping to find the best insurance plan for her  She states that her  allegedly won a couple of million that they wanted to deliver   She states that she is still grieving the loss of her   Grief counseling  A: Jacquie Russell appears to be making some progress as evidenced by her recognizing that she is getting better as she feels that she does not have her  in the front of her mind all of the time  P: Jacquie Russell will continue to go to [employer] and continue to keep in contact with her friends  Behavioral Health Treatment Plan [facility]: Diagnosis and Treatment Plan explained to Classie Pod relates understanding diagnosis and is agreeable to Treatment Plan   Yes

## 2022-02-16 NOTE — PROGRESS NOTES
Established Patient Progress Note       Chief Complaint   Patient presents with    Hyperparathyroidism        History of Present Illness:     Gallo Dasilva is a 61 y o  female with osteoporosis and primary hyperparathyroidism presenting to the office today for follow up  To review:    Patient had been seen by endocrinology in 2018 for a workup of hyperparathyroidism      At that time, she was found to be deficient in Vit D and DEXA scan demonstrated low bone density  24 hour urine was unremarkable  PTH and serum calcium normalized for a time  On last lab work from 6/3/2021, serum Ca+ was noted to be elevated to 11 2 mg/dL  Symptoms of hypercalcemia:  Polydipsia/polyuria: -  Fatigue: +  Anxiety/depression: + (multifactoral)  H/o kidney stones: -  Flank pain/dysuria: -  Decreased appetite: -  Constipation: -  Bone pain: -  Fragility fractures: -  Osteoporosis: +     Ca+ consumption: Does not consume dairy  Does eat vegetables, legumes, and fish  Denies use of Calcium containing antacids  Supplements: Taking 2,000 iu of Vit D daily    Drinking two bottles of water, one vitamin water, and fruit juice throughout the day  Today, patient is very anxious and tearful  It is the 2 year anniversary of the passing of her   She is following with a counselor  She will be seeing that provider tomorrow  Osteoporosis was diagnosed on 11/15/21 via DEXA scan  Patient opted to treat with 70 mg of alendronate weekly  DXA SCAN     CLINICAL HISTORY:  70-year-old postmenopausal female  OTHER RISK FACTORS:  Smoking history, hyperparathyroidism, takes SSRIs      PHARMACOLOGIC THERAPY FOR OSTEOPOROSIS:  None      TECHNIQUE: Bone densitometry was performed using a GE Lunar Prodigy   bone densitometer  Regions of interest appear properly placed       COMPARISON: There are no prior DXA studies performed on this unit for comparison   A prior study from 10/1/2018 was performed at a different facility and it is not possible to quantitatively compare BMD or to calculate a LSC between different facilities   without cross calibration         RESULTS:      LUMBAR SPINE L1-L4 :   BMD  0 840  gm/cm2   T-score -2 9         LEFT TOTAL HIP:   BMD: 0 914  gm/cm2   T-score: -0 7      LEFT FEMORAL NECK:   BMD: 0 840  gm/cm2   T score: -1 4      RIGHT TOTAL HIP:   BMD:  0 881  gm/cm2   T-score: -1 0     RIGHT FEMORAL NECK:   BMD:  0 842  gm/cm2  T score: -1 4      LEFT  FOREARM:    33% RADIUS BMD:  0 552  gm/cm2    T-score: -2 2      IMPRESSION:     1  Osteoporosis  [Based on the lumbar spine]     2  The 10 year risk of hip fracture is 1 5% with the 10 year risk of major osteoporotic fracture being 8 8% as calculated by the [facility]/WHO fracture risk assessment tool (FRAX)     3  The current NOF guidelines recommend treating patients with a T-score of -2 5 or less in the lumbar spine or hips, or in post-menopausal women and men over the age of 48 with low bone mass (osteopenia) and a FRAX 10 year risk score of >3% for hip   fracture and/or >20% for major osteoporotic fracture      4  The NOF recommends follow-up DXA in 1-2 years after initiating therapy for osteoporosis and every 2 years thereafter  More frequent evaluation is appropriate for patients with conditions associated with rapid bone loss, such as glucocorticoid   therapy  The interval between DXA screenings may be longer for individuals without major risk factors and initial T-score in the normal or upper low bone mass range        Patient Active Problem List   Diagnosis    High cholesterol    Current every day smoker    Environmental allergies    Hyperparathyroidism ([address] )    Hypercalcemia    Post-menopausal    Osteopenia    Anxiety    Benzodiazepine contract exists    Moderate episode of recurrent major depressive disorder ([address] )    Other osteoporosis without current pathological fracture      Past Medical History:   Diagnosis Date    Depression  Hyperlipidemia     last assessed - 2016      Past Surgical History:   Procedure Laterality Date     SECTION        Family History   Problem Relation Age of Onset    Cancer Mother     Cancer Father     Cancer Sister     Breast cancer Sister     No Known Problems Maternal Grandmother     No Known Problems Paternal Grandmother     No Known Problems Maternal Aunt     No Known Problems Maternal Aunt     No Known Problems Maternal Aunt     No Known Problems Paternal Aunt     No Known Problems Paternal Aunt      Social History     Tobacco Use    Smoking status: Current Every Day Smoker     Packs/day: 0 50     Years: 30 00     Pack years: 15 00     Types: Cigarettes    Smokeless tobacco: Never Used   Substance Use Topics    Alcohol use: No     Allergies   Allergen Reactions    Sulfa Antibiotics        Current Outpatient Medications:     alendronate (FOSAMAX) 70 mg tablet, Take 1 tablet (70 mg total) by mouth every 7 days, Disp: 12 tablet, Rfl: 2    atorvastatin (LIPITOR) 20 mg tablet, Take 1 tablet (20 mg total) by mouth daily, Disp: 90 tablet, Rfl: 3    Cholecalciferol (EQL VITAMIN D3) 2000 units CAPS, Take by mouth daily, Disp: , Rfl:     fluticasone (FLONASE) 50 mcg/act nasal spray, 2 sprays into each nostril daily, Disp: , Rfl:     LORazepam (ATIVAN) 0 5 mg tablet, Take 1 tablet (0 5 mg total) by mouth every 8 (eight) hours as needed for anxiety, Disp: 30 tablet, Rfl: 0    sertraline (ZOLOFT) 50 mg tablet, Take 1 tablet (50 mg total) by mouth daily, Disp: 90 tablet, Rfl: 0    timolol (TIMOPTIC) 0 5 % ophthalmic solution, , Disp: , Rfl:     Review of Systems   Constitutional: Positive for fatigue  Negative for activity change, appetite change and unexpected weight change  HENT: Negative for dental problem, sore throat, trouble swallowing and voice change  Eyes: Negative for visual disturbance  Respiratory: Negative for cough, chest tightness and shortness of breath  Cardiovascular: Negative for chest pain, palpitations and leg swelling  Gastrointestinal: Negative for constipation, diarrhea, nausea and vomiting  Endocrine: Negative for polydipsia, polyphagia and polyuria  Genitourinary: Positive for frequency (d/t increased fluid intake)  Musculoskeletal: Negative for arthralgias, back pain, gait problem, joint swelling and myalgias  Skin: Negative for wound  Allergic/Immunologic: Positive for environmental allergies  Neurological: Negative for dizziness, weakness, light-headedness, numbness and headaches  Hematological: Does not bruise/bleed easily  Psychiatric/Behavioral: Positive for decreased concentration and dysphoric mood  Negative for sleep disturbance  The patient is nervous/anxious  Physical Exam:  There is no height or weight on file to calculate BMI  /78   Pulse 72    Wt Readings from Last 3 Encounters:   11/29/21 66 2 kg (146 lb)   09/13/21 66 2 kg (146 lb)   08/11/21 67 9 kg (149 lb 9 6 oz)       Physical Exam  Vitals reviewed  Constitutional:       General: She is not in acute distress  Appearance: She is well-developed  She is not ill-appearing  HENT:      Head: Normocephalic and atraumatic  Comments: Mask in place  Eyes:      Pupils: Pupils are equal, round, and reactive to light  Neck:      Thyroid: No thyromegaly  Cardiovascular:      Rate and Rhythm: Normal rate and regular rhythm  Pulses: Normal pulses  Heart sounds: Normal heart sounds  Pulmonary:      Effort: Pulmonary effort is normal       Breath sounds: Normal breath sounds  Abdominal:      General: Bowel sounds are normal       Palpations: Abdomen is soft  Musculoskeletal:         General: No tenderness or signs of injury  Cervical back: Normal range of motion and neck supple  Right lower leg: No edema  Left lower leg: No edema  Lymphadenopathy:      Cervical: No cervical adenopathy     Skin:     General: Skin is warm and dry  Capillary Refill: Capillary refill takes less than 2 seconds  Neurological:      Mental Status: She is alert and oriented to person, place, and time  Gait: Gait normal    Psychiatric:         Mood and Affect: Mood normal          Behavior: Behavior normal       Comments: Tearful  Labs:       Lab Results   Component Value Date    CREATININE 0 72 11/24/2021    CREATININE 0 73 06/03/2021    CREATININE 0 75 03/11/2020    BUN 16 11/24/2021     11/28/2014    K 4 1 11/24/2021     (H) 11/24/2021    CO2 27 11/24/2021     eGFR   Date Value Ref Range Status   11/24/2021 89 ml/min/1 73sq m Final       Lab Results   Component Value Date    CHOL 162 11/28/2014    HDL 62 06/03/2021    TRIG 130 06/03/2021       Lab Results   Component Value Date    ALT 30 11/24/2021    AST 17 11/24/2021    ALKPHOS 104 11/24/2021    BILITOT 0 80 11/28/2014       Lab Results   Component Value Date    FREET4 1 02 10/12/2018         Impression & Plan:    Problem List Items Addressed This Visit        Endocrine    Hyperparathyroidism ([facility] [address] ) - Primary     Counseled patient on pathophysiology of hyperparathyroidism  We did discuss the possibility of completing a Sestamibi scan and discuss parathyroidectomy with surgical oncology in order to stop the cause of her osteoporosis  At this time, she will complete her labs next month after seeing her PCP  If serum Ca+ remains significantly elevated, will again encourage her to consider meeting with surgeons and discussing parathyroidectomy  In the meantime, continue to remain well hydrated  Continue fosamax for osteoporosis  Relevant Orders    Comprehensive metabolic panel Lab Collect    PTH, intact Lab Collect Lab Collect       Musculoskeletal and Integument    Other osteoporosis without current pathological fracture     Reviewed pathophysiology of osteoporosis and MOC of Fosamax  Continue current regimen  Continue Vit D supplements   Continue with weight bearing exercise  Reviewed fall precautions  Next DEXA scan due in November of 2023  Relevant Medications    alendronate (FOSAMAX) 70 mg tablet          Orders Placed This Encounter   Procedures    Comprehensive metabolic panel Lab Collect     This is a patient instruction: Patient fasting for 8 hours or longer recommended  Standing Status:   Future     Standing Expiration Date:   2/17/2023    PTH, intact Lab Collect Lab Collect     Standing Status:   Future     Standing Expiration Date:   2/17/2023       There are no Patient Instructions on file for this visit  Discussed with the patient and all questioned fully answered  She will call me if any problems arise  Follow-up appointment in 12 months       Counseled patient on diagnostic results, prognosis, risk and benefit of treatment options, instruction for management, importance of treatment compliance, Risk  factor reduction and impressions      JAVON Ni

## 2022-02-17 ENCOUNTER — OFFICE VISIT (OUTPATIENT)
Dept: ENDOCRINOLOGY | Facility: CLINIC | Age: 67
End: 2022-02-17
Payer: COMMERCIAL

## 2022-02-17 VITALS — DIASTOLIC BLOOD PRESSURE: 78 MMHG | SYSTOLIC BLOOD PRESSURE: 118 MMHG | HEART RATE: 72 BPM

## 2022-02-17 DIAGNOSIS — M81.8 OTHER OSTEOPOROSIS WITHOUT CURRENT PATHOLOGICAL FRACTURE: ICD-10-CM

## 2022-02-17 DIAGNOSIS — E21.3 HYPERPARATHYROIDISM (HCC): Primary | ICD-10-CM

## 2022-02-17 PROCEDURE — 99214 OFFICE O/P EST MOD 30 MIN: CPT | Performed by: NURSE PRACTITIONER

## 2022-02-17 RX ORDER — ALENDRONATE SODIUM 70 MG/1
70 TABLET ORAL
Qty: 12 TABLET | Refills: 2 | Status: SHIPPED | OUTPATIENT
Start: 2022-02-17

## 2022-02-17 NOTE — ASSESSMENT & PLAN NOTE
Reviewed pathophysiology of osteoporosis and MOC of Fosamax  Continue current regimen  Continue Vit D supplements  Continue with weight bearing exercise  Reviewed fall precautions  Next DEXA scan due in November of 2023

## 2022-02-17 NOTE — ASSESSMENT & PLAN NOTE
Counseled patient on pathophysiology of hyperparathyroidism  We did discuss the possibility of completing a Sestamibi scan and discuss parathyroidectomy with surgical oncology in order to stop the cause of her osteoporosis  At this time, she will complete her labs next month after seeing her PCP  If serum Ca+ remains significantly elevated, will again encourage her to consider meeting with surgeons and discussing parathyroidectomy  In the meantime, continue to remain well hydrated  Continue fosamax for osteoporosis

## 2022-02-18 ENCOUNTER — SOCIAL WORK (OUTPATIENT)
Dept: BEHAVIORAL/MENTAL HEALTH CLINIC | Facility: CLINIC | Age: 67
End: 2022-02-18
Payer: COMMERCIAL

## 2022-02-18 DIAGNOSIS — F41.9 ANXIETY: ICD-10-CM

## 2022-02-18 DIAGNOSIS — F33.1 MODERATE EPISODE OF RECURRENT MAJOR DEPRESSIVE DISORDER (HCC): Primary | ICD-10-CM

## 2022-02-18 PROCEDURE — 90834 PSYTX W PT 45 MINUTES: CPT | Performed by: SOCIAL WORKER

## 2022-02-18 NOTE — PSYCH
Start Time 1:00PM-1:45PM  Psychotherapy Provided: Individual Psychotherapy 45 minutes   Length of time in session: 45 minutes, follow up in 4  weeks    No diagnosis found  Moderate episode of moderate anxiety, and anxiety    Goals addressed in session: Goal 1     Pain:  None    none  0  Current suicide risk : Low   D: Jacquie Russell reports that it is difficult being home  She goes to the [employer] store and go through clothes  Her and her  were pretty much homebodies when he was alive so she does not have a big support system  She continues to work  She feels good when she is at work as she is busy and taking care of people  When she is at work she feels has a purpose  Jacquie Russell discussed that when her son has people over she likes having people in the house  She gave up on going through the "stuff" and getting rid of things  She has been going through clothing, cleaning  She cried when she spoke of her  and how their dog was "his baby "  She likes working outside  She states that her oldest granddaughter has such an attitude with her son  She cried when she spoke of her  making her coffee and making her dinner  We spoke of some of their travels and where they went  She states that she has an over active thyroid and she has to go for blood work  She cried when she states that she is so addicted to her cigarettes  She did ask one yue out on and they went out and she states that it was very nice  She does not think that he wants it to go any further  She states that the two of them talk on the phone for hours,  She is appropriately dressed in casual manner, and wearing glasses, being overweight  Her thought process is logical and speech is normal rate and volume  Her mood is dysthymic and affect cannot be assessed due to Covid masking  Support and reflective listening were used ,  DAGOBERTO Russell appears to be making some progress as evidenced by her spending time with another man that she is friends with   She has limited supports and continues to go to [employer] to try and build her support network  P: Erasmo aPlm will try and attend a [employer] function in the next month if available and follow up with this writer in one month  Behavioral Health Treatment Plan [address] [place] [address] [address]: Diagnosis and Treatment Plan explained to Mathieu Mathew relates understanding diagnosis and is agreeable to Treatment Plan   Yes

## 2022-02-23 DIAGNOSIS — F33.1 MODERATE EPISODE OF RECURRENT MAJOR DEPRESSIVE DISORDER (HCC): ICD-10-CM

## 2022-03-14 ENCOUNTER — OFFICE VISIT (OUTPATIENT)
Dept: FAMILY MEDICINE CLINIC | Facility: CLINIC | Age: 67
End: 2022-03-14
Payer: COMMERCIAL

## 2022-03-14 VITALS
DIASTOLIC BLOOD PRESSURE: 80 MMHG | WEIGHT: 148 LBS | HEART RATE: 71 BPM | TEMPERATURE: 98.5 F | BODY MASS INDEX: 26.22 KG/M2 | OXYGEN SATURATION: 96 % | SYSTOLIC BLOOD PRESSURE: 122 MMHG | HEIGHT: 63 IN

## 2022-03-14 DIAGNOSIS — Z79.899 BENZODIAZEPINE CONTRACT EXISTS: ICD-10-CM

## 2022-03-14 DIAGNOSIS — F41.9 ANXIETY: ICD-10-CM

## 2022-03-14 DIAGNOSIS — E21.3 HYPERPARATHYROIDISM (HCC): ICD-10-CM

## 2022-03-14 DIAGNOSIS — E78.00 HIGH CHOLESTEROL: Primary | ICD-10-CM

## 2022-03-14 PROCEDURE — 99214 OFFICE O/P EST MOD 30 MIN: CPT | Performed by: FAMILY MEDICINE

## 2022-03-14 PROCEDURE — 4004F PT TOBACCO SCREEN RCVD TLK: CPT | Performed by: FAMILY MEDICINE

## 2022-03-14 PROCEDURE — 3008F BODY MASS INDEX DOCD: CPT | Performed by: FAMILY MEDICINE

## 2022-03-14 NOTE — PROGRESS NOTES
Assessment/Plan:         Problem List Items Addressed This Visit        Endocrine    Hyperparathyroidism [facility])     Patient has repeat labs ordered by Joy which she will get done  We discuss parathyroid adenomas and treatment usually is surgical  She is aware and will get the labs done then possibly sestamibi scan and referral to Dr Vladimir Terrazas  Other    High cholesterol - Primary     Check labs  Continue statin  Relevant Orders    Lipid Panel with Direct LDL reflex    Anxiety     Stable on Sertraline  Rarely taking Ativan  She is in therapy  Benzodiazepine contract exists     Updated agreement today                 Subjective:      Patient ID: Saurabh Brock is a 61 y o  female  61year old here for a check up  Hyperparathyroid - sees endocrinology  She is due for repeat labs  She was very anxious because she was told she may be referred to Dr Vladimir Terrazas who is a surgical oncologist who took care of her   She has anxiety -is stable on Sertraline  Rarely takes Ativan  Due for contract  The following portions of the patient's history were reviewed and updated as appropriate:   Past Medical History:  She has a past medical history of Depression and Hyperlipidemia ,  _______________________________________________________________________  Medical Problems:  does not have any pertinent problems on file ,  _______________________________________________________________________  Past Surgical History:   has a past surgical history that includes  section  ,  _______________________________________________________________________  Family History:  family history includes Breast cancer in her sister; Cancer in her father, mother, and sister;  No Known Problems in her maternal aunt, maternal aunt, maternal aunt, maternal grandmother, paternal aunt, paternal aunt, and paternal grandmother ,  _______________________________________________________________________  Social History: reports that she has been smoking cigarettes  She has a 15 00 pack-year smoking history  She has never used smokeless tobacco  She reports that she does not drink alcohol and does not use drugs  ,  _______________________________________________________________________  Allergies:  is allergic to sulfa antibiotics     _______________________________________________________________________  Current Outpatient Medications   Medication Sig Dispense Refill    alendronate (FOSAMAX) 70 mg tablet Take 1 tablet (70 mg total) by mouth every 7 days 12 tablet 2    atorvastatin (LIPITOR) 20 mg tablet Take 1 tablet (20 mg total) by mouth daily 90 tablet 3    Cholecalciferol (EQL VITAMIN D3) 2000 units CAPS Take by mouth daily      fluticasone (FLONASE) 50 mcg/act nasal spray 2 sprays into each nostril daily      LORazepam (ATIVAN) 0 5 mg tablet Take 1 tablet (0 5 mg total) by mouth every 8 (eight) hours as needed for anxiety 30 tablet 0    sertraline (ZOLOFT) 50 mg tablet TAKE ONE TABLET BY MOUTH EVERY DAY 90 tablet 0    timolol (TIMOPTIC) 0 5 % ophthalmic solution        No current facility-administered medications for this visit      _______________________________________________________________________  Review of Systems   Constitutional: Negative for activity change  Respiratory: Negative for chest tightness and shortness of breath  Cardiovascular: Negative for chest pain and leg swelling  Neurological: Negative for headaches  Psychiatric/Behavioral: Positive for dysphoric mood  The patient is nervous/anxious  Objective:  Vitals:    03/14/22 1335   BP: 122/80   Pulse: 71   Temp: 98 5 °F (36 9 °C)   SpO2: 96%   Weight: 67 1 kg (148 lb)   Height: 5' 3" (1 6 m)     Body mass index is 26 22 kg/m²  Physical Exam  Vitals and nursing note reviewed  Constitutional:       General: She is not in acute distress  Appearance: Normal appearance  She is not ill-appearing, toxic-appearing or diaphoretic  HENT:      Head: Normocephalic and atraumatic  Right Ear: External ear normal       Left Ear: External ear normal    Neck:      Thyroid: No thyroid mass or thyromegaly  Cardiovascular:      Rate and Rhythm: Normal rate and regular rhythm  Heart sounds: No murmur heard  No friction rub  Pulmonary:      Effort: Pulmonary effort is normal  No respiratory distress  Breath sounds: Normal breath sounds  No stridor  No wheezing, rhonchi or rales  Musculoskeletal:         General: No swelling  Right lower leg: No edema  Left lower leg: No edema  Lymphadenopathy:      Cervical: No cervical adenopathy  Neurological:      General: No focal deficit present  Mental Status: She is alert  Mental status is at baseline  Psychiatric:         Attention and Perception: Attention normal          Mood and Affect: Mood normal  Affect is tearful  Speech: Speech normal          Behavior: Behavior normal          Thought Content:  Thought content normal          Judgment: Judgment normal

## 2022-03-14 NOTE — ASSESSMENT & PLAN NOTE
Patient has repeat labs ordered by Endo which she will get done  We discuss parathyroid adenomas and treatment usually is surgical  She is aware and will get the labs done then possibly sestamibi scan and referral to Dr Keira Guido

## 2022-03-24 ENCOUNTER — SOCIAL WORK (OUTPATIENT)
Dept: BEHAVIORAL/MENTAL HEALTH CLINIC | Facility: CLINIC | Age: 67
End: 2022-03-24
Payer: COMMERCIAL

## 2022-03-24 DIAGNOSIS — F33.1 MODERATE EPISODE OF RECURRENT MAJOR DEPRESSIVE DISORDER (HCC): Primary | ICD-10-CM

## 2022-03-24 PROCEDURE — 90834 PSYTX W PT 45 MINUTES: CPT | Performed by: SOCIAL WORKER

## 2022-03-24 NOTE — PSYCH
Start Time 1:00PM-1:45PM  Psychotherapy Provided: Individual Psychotherapy 45 minutes   Length of time in session: 45 minutes, follow up in 3-4 weeks    No diagnosis found  Moderate episode of recurrent m MDD and anxiety   Goals addressed in session: Goal 1   Pain: None    none  0  Current suicide risk : Low    D  Ramon Randolph reports that she has been painting a wrought iron fence at her home  She is trying to keep busy and she reports still being lonely  Her communication with her friend is ongoing  She is going to have thyroid surgery and the specialist she is going to is the same doctor who was her 's cancer doctor  She reports taking her anti depressant daily  She is not getting a good night sleep and since it is interrupted  She will rely on OTC sleep medication the nights before she works  She states that she continues to miss her  dearly especially when things are going wrong or she is going to bed  She kisses his picture every night  She has no desire to stop working even when she turns 74  She is trying to combine trips to save on gas  She is waiting for her income tax back from last year and she has to submit her taxes for this year  CBT was implemented to challenge her thought process as far as being alone or being lonely  She is appropriately dressed in a casual manner, wearing glasses and being well groomed  Her thought process is logical and speech is normal rate and normal volume  Her mood is dysthymic and affect cannot be assessed due to Covid masking  David Carbajal appears to be making some progress as evidenced by her active participation throughout the session  Her area of concern is her being lonely  Kseniamaritza Grettas will follow up in 3-4 weeks and she will engage in one social activity weekly until next visit  Behavioral Health Treatment Plan [facility]: Diagnosis and Treatment Plan explained to Jude Marinelli relates understanding diagnosis and is agreeable to Treatment Plan   Yes

## 2022-04-01 ENCOUNTER — APPOINTMENT (OUTPATIENT)
Dept: LAB | Facility: CLINIC | Age: 67
End: 2022-04-01
Payer: COMMERCIAL

## 2022-04-01 DIAGNOSIS — E21.3 HYPERPARATHYROIDISM (HCC): ICD-10-CM

## 2022-04-01 DIAGNOSIS — E78.00 HIGH CHOLESTEROL: ICD-10-CM

## 2022-04-01 LAB
ALBUMIN SERPL BCP-MCNC: 4.2 G/DL (ref 3.5–5)
ALP SERPL-CCNC: 61 U/L (ref 46–116)
ALT SERPL W P-5'-P-CCNC: 29 U/L (ref 12–78)
ANION GAP SERPL CALCULATED.3IONS-SCNC: 2 MMOL/L (ref 4–13)
AST SERPL W P-5'-P-CCNC: 11 U/L (ref 5–45)
BILIRUB SERPL-MCNC: 0.62 MG/DL (ref 0.2–1)
BUN SERPL-MCNC: 13 MG/DL (ref 5–25)
CALCIUM SERPL-MCNC: 10.7 MG/DL (ref 8.3–10.1)
CHLORIDE SERPL-SCNC: 110 MMOL/L (ref 100–108)
CHOLEST SERPL-MCNC: 167 MG/DL
CO2 SERPL-SCNC: 30 MMOL/L (ref 21–32)
CREAT SERPL-MCNC: 0.78 MG/DL (ref 0.6–1.3)
GFR SERPL CREATININE-BSD FRML MDRD: 80 ML/MIN/1.73SQ M
GLUCOSE P FAST SERPL-MCNC: 86 MG/DL (ref 65–99)
HDLC SERPL-MCNC: 68 MG/DL
LDLC SERPL CALC-MCNC: 80 MG/DL (ref 0–100)
POTASSIUM SERPL-SCNC: 4.5 MMOL/L (ref 3.5–5.3)
PROT SERPL-MCNC: 7.1 G/DL (ref 6.4–8.2)
PTH-INTACT SERPL-MCNC: 98 PG/ML (ref 18.4–80.1)
SODIUM SERPL-SCNC: 142 MMOL/L (ref 136–145)
TRIGL SERPL-MCNC: 97 MG/DL

## 2022-04-01 PROCEDURE — 36415 COLL VENOUS BLD VENIPUNCTURE: CPT

## 2022-04-01 PROCEDURE — 80061 LIPID PANEL: CPT

## 2022-04-01 PROCEDURE — 83970 ASSAY OF PARATHORMONE: CPT

## 2022-04-01 PROCEDURE — 80053 COMPREHEN METABOLIC PANEL: CPT

## 2022-04-21 ENCOUNTER — SOCIAL WORK (OUTPATIENT)
Dept: BEHAVIORAL/MENTAL HEALTH CLINIC | Facility: CLINIC | Age: 67
End: 2022-04-21

## 2022-04-21 DIAGNOSIS — F43.29 GRIEF REACTION WITH PROLONGED BEREAVEMENT: Primary | ICD-10-CM

## 2022-04-21 DIAGNOSIS — F41.9 ANXIETY: ICD-10-CM

## 2022-04-21 DIAGNOSIS — F33.1 MODERATE EPISODE OF RECURRENT MAJOR DEPRESSIVE DISORDER (HCC): ICD-10-CM

## 2022-04-21 PROCEDURE — 90834 PSYTX W PT 45 MINUTES: CPT | Performed by: SOCIAL WORKER

## 2022-04-21 NOTE — PSYCH
Start Time 12:05PM-12:50PM  Psychotherapy Provided: Individual Psychotherapy 45 minutes     Length of time in session: 45 minutes, follow up in 4-5 week    No diagnosis found  Depression and anxiety  Goals addressed in session: Goal 1   Pain:  None    none  0  Current suicide risk : Low   D  Tiara Alexandra states that she has nodules on her thyroid and she is going next Friday for the test and then they are scheduling surgery to remove the bad one  She states that she is still talking to her friend from [employer]  She states a few weeks ago he came over for coffee  She really likes this man and they have a lot in common  She knew his wife and he went to school with her   She worked on [employer] and then went to her son's house in [place]  Her son tried settling with his soon to be ex-wife  Her daughter in law is giving her son a hard time and wanted more child support  Her son is trying to get a house in [place] so the kids can stay in the same school district  Tiara Alexandra is appropriately dressed in a casual manner and well groomed, wearing glasses  Her mood is dysthymic and affect cannot be assessed due to Covid masking  Her thought process is logical and speech is normal rate and normal volume  She states that her smoking has gotten really bad  Her refrigerator broke and that made her miss her  even more  Grief counseling  She is going on vacation with her son and the kids to the [facility]  Mindfulness reviewed  Cata Cespedes  Appears to be struggling with her grief which becomes overwhelming at times  She has supports in her family  Margarito Gitelman will identify a good day and identify 3 things that are different from her bad days  She will follow up with this writer in 5 weeks  Behavioral Health Treatment Plan [facility]: Diagnosis and Treatment Plan explained to Erica Oviedo relates understanding diagnosis and is agreeable to Treatment Plan   Yes

## 2022-05-06 ENCOUNTER — HOSPITAL ENCOUNTER (OUTPATIENT)
Dept: NUCLEAR MEDICINE | Facility: HOSPITAL | Age: 67
Discharge: HOME | End: 2022-05-06
Payer: MEDICARE

## 2022-05-06 DIAGNOSIS — E21.3 HYPERPARATHYROIDISM (HCC): ICD-10-CM

## 2022-05-06 PROCEDURE — G1004 CDSM NDSC: HCPCS

## 2022-05-06 PROCEDURE — 78071 PARATHYRD PLANAR W/WO SUBTRJ: CPT

## 2022-05-06 PROCEDURE — A9500 TC99M SESTAMIBI: HCPCS

## 2022-05-12 ENCOUNTER — TELEPHONE (OUTPATIENT)
Dept: HEMATOLOGY ONCOLOGY | Facility: CLINIC | Age: 67
End: 2022-05-12

## 2022-05-13 ENCOUNTER — TELEPHONE (OUTPATIENT)
Dept: HEMATOLOGY ONCOLOGY | Facility: CLINIC | Age: 67
End: 2022-05-13

## 2022-05-13 ENCOUNTER — HOSPITAL ENCOUNTER (OUTPATIENT)
Dept: ULTRASOUND IMAGING | Facility: HOSPITAL | Age: 67
Discharge: HOME | End: 2022-05-13
Payer: MEDICARE

## 2022-05-13 DIAGNOSIS — E21.3 HYPERPARATHYROIDISM (HCC): ICD-10-CM

## 2022-05-13 PROCEDURE — 76536 US EXAM OF HEAD AND NECK: CPT

## 2022-05-13 NOTE — TELEPHONE ENCOUNTER
New Patient Intake Form   Patient Details:    Kiara Scanlon  1955    Appointment Information   Who is calling to schedule? patient   If not self, what is the caller's name? Please put name of [facility] nurse as well  n/a   DID YOU CONFIRM INSURANCE WITH PATIENT? yes   Referring provider Dr Janki Gr   What is the diagnosis? Hyperparathyroidism      Is there a confirmed tissue diagnosis?   no   Is there a biopsy ordered or pending? Please specify dates   no     Is patient aware of diagnosis? yes   Have you had any imaging or labs done? If yes, where? (If imaging done outside of [facility], please remind patient to bring a disk ) Yes labs on 4/1, imaging 5/6      If imaging done at outside facility, did you instruct patient to obtain discs and bring to visit? no   Have you been seen by another Oncologist/Hematologist?  If so, who and where? no   Are the records in [facility] or Care Everywhere? yes   Does the patient have records at another facility/hospital? no   If yes, Name of facility, city and state where facility is located  Did you instruct patient to have records faxed to [facility] and provide rightfax number? n/a   Preferred [place]   Is the patient willing to be seen by another provider? (This is for breast patients only) n/a     Did you send new patient paperwork? Email or mail?  Yes mailed to [phone]-   Miscellaneous Information: appt on 6/8 @ 215 with Dr Srinivasan Coreas at [facility]

## 2022-05-17 DIAGNOSIS — E04.1 THYROID NODULE: Primary | ICD-10-CM

## 2022-05-26 ENCOUNTER — SOCIAL WORK (OUTPATIENT)
Dept: BEHAVIORAL/MENTAL HEALTH CLINIC | Facility: CLINIC | Age: 67
End: 2022-05-26
Payer: MEDICARE

## 2022-05-26 DIAGNOSIS — F33.1 MODERATE EPISODE OF RECURRENT MAJOR DEPRESSIVE DISORDER (HCC): Primary | ICD-10-CM

## 2022-05-26 DIAGNOSIS — F43.29 GRIEF REACTION WITH PROLONGED BEREAVEMENT: ICD-10-CM

## 2022-05-26 DIAGNOSIS — F41.9 ANXIETY: ICD-10-CM

## 2022-05-26 PROCEDURE — 90834 PSYTX W PT 45 MINUTES: CPT | Performed by: SOCIAL WORKER

## 2022-05-26 NOTE — PSYCH
Start Time 11:55PM-12:40PM  Psychotherapy Provided: Individual Psychotherapy 45 minutes   Length of time in session: 45 minutes, follow up in 4 week    No diagnosis found  Goals addressed in session: Goal 1   Pain:  None    none  0  Current suicide risk : Low   D  Una Vaz reports that she has to have a biopsy of the nodules on her thyroid  She is nervous about this and she says she found a lump in her mouth  She states it getting worse  She was encouraged to make an appointment with either her PCP or her dentist to have it checked out  She is devastated about all of this and does not want to do it alone  She has no more bad news  She quit smoking  And it has been a couple of days now  She states that her nerves are not in a good place  Smoking does bother the lump in her mouth  She is trying to keep busy shopping with her sisters, her gentleman friend from [employer] came over for coffee,  She is going on vacation with her kids and her son took her out for her 74 birthday  She got her Medicare and her supplement  She does get her 's social security as she gets more under him as a   She is on the med passes so she  Is not lifting and pulling  Her son is helping out somewhat around the house with bills  She states that her [employer] friend wants to be just friends and nothing more  They talk to each other 1-2 times a week  She appreciates him as a friend  Her daughter in law has not gone to court yet and she is being greedy with wanting money from her son  Una Vaz is appropriately dressed in a casual manner, wearing glasses, beng well groomed and being overweight  Her mood is dysthymic and affect cannot be assessed due to Covid masking  Thought process is logical and speech is normal rate and normal volume  She was offered support and encouraged to use her natural supports as necessary  She continues to grieve the loss of her  who passed 2 yeras ago      Ambrose Beltrán Appears to be making some progress as evidenced by her acceptance of her loss but her continuing with the grieving process and she is feeling it more now due to her biopsy and being alone  She does have supports but does not always use them appropriately  Sherlyn Luz will follow up with this writer next month  She will use her supports and follow up with medical testing  Behavioral Health Treatment Plan [facility]: Diagnosis and Treatment Plan explained to Ulysses Harpin relates understanding diagnosis and is agreeable to Treatment Plan   Yes

## 2022-06-01 NOTE — NURSING NOTE
Call placed to patient to discuss upcoming ultrasound guided thyroid biopsy at [address] [place] [address] Radiology  Allergies reviewed and verified patient does not currently take any anticoagulant medications  Pre procedure instructions including diet and taking own medications discussed with patient  Patient instructed that she may eat normally and take medications as usual before the procedure  Procedure and post procedure expectations and instructions reviewed with the patient  Patient verbalizes understanding and denies any questions at this time  Reminded of the location, date and time of the expected procedure

## 2022-06-06 ENCOUNTER — TELEPHONE (OUTPATIENT)
Dept: SURGICAL ONCOLOGY | Facility: CLINIC | Age: 67
End: 2022-06-06

## 2022-06-06 NOTE — TELEPHONE ENCOUNTER
LM asking patient to call back to my teams number to reschedule appt with Dr Evangelist Hale until after her thyroid bx results are back

## 2022-06-08 DIAGNOSIS — F33.1 MODERATE EPISODE OF RECURRENT MAJOR DEPRESSIVE DISORDER (HCC): ICD-10-CM

## 2022-06-09 ENCOUNTER — TELEPHONE (OUTPATIENT)
Dept: SURGICAL ONCOLOGY | Facility: CLINIC | Age: 67
End: 2022-06-09

## 2022-06-09 ENCOUNTER — HOSPITAL ENCOUNTER (OUTPATIENT)
Dept: ULTRASOUND IMAGING | Facility: HOSPITAL | Age: 67
Discharge: HOME | End: 2022-06-09
Admitting: RADIOLOGY
Payer: MEDICARE

## 2022-06-09 DIAGNOSIS — E04.1 THYROID NODULE: ICD-10-CM

## 2022-06-09 PROCEDURE — 10005 FNA BX W/US GDN 1ST LES: CPT

## 2022-06-09 PROCEDURE — 88172 CYTP DX EVAL FNA 1ST EA SITE: CPT | Performed by: PATHOLOGY

## 2022-06-09 PROCEDURE — 88173 CYTOPATH EVAL FNA REPORT: CPT | Performed by: PATHOLOGY

## 2022-06-09 RX ORDER — LIDOCAINE HYDROCHLORIDE 10 MG/ML
10 INJECTION, SOLUTION EPIDURAL; INFILTRATION; INTRACAUDAL; PERINEURAL ONCE
Status: COMPLETED | OUTPATIENT
Start: 2022-06-09 | End: 2022-06-09

## 2022-06-09 RX ADMIN — LIDOCAINE HYDROCHLORIDE 10 ML: 10 INJECTION, SOLUTION EPIDURAL; INFILTRATION; INTRACAUDAL at 11:49

## 2022-06-09 NOTE — TELEPHONE ENCOUNTER
Patient called into my teams number to ask if I would let [facility] know that she is running late for her US guided bx of her thyroid  Patient states she may be 30 minutes late  Patient told that they may not be able to accommodate her, but that I would let them know  I reached out to [facility] and let them know  They said that they would let the tech know and reach out the patient

## 2022-06-15 ENCOUNTER — TELEPHONE (OUTPATIENT)
Dept: FAMILY MEDICINE CLINIC | Facility: CLINIC | Age: 67
End: 2022-06-15

## 2022-06-15 ENCOUNTER — ANNUAL EXAM (OUTPATIENT)
Dept: FAMILY MEDICINE CLINIC | Facility: CLINIC | Age: 67
End: 2022-06-15
Payer: MEDICARE

## 2022-06-15 VITALS
BODY MASS INDEX: 26.82 KG/M2 | SYSTOLIC BLOOD PRESSURE: 128 MMHG | HEIGHT: 63 IN | HEART RATE: 90 BPM | WEIGHT: 151.4 LBS | TEMPERATURE: 97.5 F | DIASTOLIC BLOOD PRESSURE: 82 MMHG | OXYGEN SATURATION: 97 %

## 2022-06-15 DIAGNOSIS — Z01.419 WELL WOMAN EXAM WITH ROUTINE GYNECOLOGICAL EXAM: Primary | ICD-10-CM

## 2022-06-15 PROCEDURE — G0476 HPV COMBO ASSAY CA SCREEN: HCPCS | Performed by: FAMILY MEDICINE

## 2022-06-15 PROCEDURE — G0145 SCR C/V CYTO,THINLAYER,RESCR: HCPCS | Performed by: FAMILY MEDICINE

## 2022-06-15 PROCEDURE — G0101 CA SCREEN;PELVIC/BREAST EXAM: HCPCS | Performed by: FAMILY MEDICINE

## 2022-06-15 NOTE — TELEPHONE ENCOUNTER
I called for the due date of patient's next Medicare Annual Wellness Visit and she is due 5/1/23   (they state the next year- first of the month when the last one was done  I called the patient and cancelled her appointment for the Wellness, it would not have been paid for  Is there a way to get the due date of her next wellness in the system?   I do not know when her Wellness was actually billed for so I would schedule for June 2023

## 2022-06-15 NOTE — PROGRESS NOTES
Soto Willis 1955 female MRN: [id number]    ASSESSMENT/PLAN  Problem List Items Addressed This Visit        Other    Well woman exam with routine gynecological exam - Primary    Relevant Orders    Liquid-based pap, screening                Future Appointments   Date Time Provider Balbina Dawson   2022  1:30 PM Cyndy De Los Santos MD SURG ONC [facility]On[facility]   2022 12:00 PM MD ROWENA Massey FP Practice-Nor   2023 12:45 PM JAVON Pratt PA Med Spc          SUBJECTIVE  CC: Gynecologic Exam      HPI:  Soto Willis is a 74 y o  female who presents for pap    Gynecologic History  OB History        2    Para   2    Term   2            AB        Living           SAB        IAB        Ectopic        Multiple        Live Births                   No LMP recorded  Patient is postmenopausal   Contraception: none  Last Pap: unknown  Last mammogram: 2021  Results were: normal        Review of Systems   Gastrointestinal: Negative for abdominal pain  Genitourinary: Negative for decreased urine volume, dyspareunia, genital sores, menstrual problem, pelvic pain, urgency, vaginal bleeding, vaginal discharge and vaginal pain         Historical Information   The patient history was reviewed as follows:    Past Medical History:   Diagnosis Date    Depression     Hyperlipidemia     last assessed - [id number]     Past Surgical History:   Procedure Laterality Date     SECTION      US GUIDED THYROID BIOPSY  2022     Family History   Problem Relation Age of Onset    Cancer Mother     Cancer Father     Cancer Sister     Breast cancer Sister     No Known Problems Maternal Grandmother     No Known Problems Paternal Grandmother     No Known Problems Maternal Aunt     No Known Problems Maternal Aunt     No Known Problems Maternal Aunt     No Known Problems Paternal Aunt     No Known Problems Paternal Aunt       Social History       Medications: Current Outpatient Medications:     alendronate (FOSAMAX) 70 mg tablet, Take 1 tablet (70 mg total) by mouth every 7 days, Disp: 12 tablet, Rfl: 2    atorvastatin (LIPITOR) 20 mg tablet, Take 1 tablet (20 mg total) by mouth daily, Disp: 90 tablet, Rfl: 3    Cholecalciferol 50 MCG (2000 UT) CAPS, Take by mouth daily, Disp: , Rfl:     fluticasone (FLONASE) 50 mcg/act nasal spray, 2 sprays into each nostril daily, Disp: , Rfl:     LORazepam (ATIVAN) 0 5 mg tablet, Take 1 tablet (0 5 mg total) by mouth every 8 (eight) hours as needed for anxiety, Disp: 30 tablet, Rfl: 0    sertraline (ZOLOFT) 50 mg tablet, TAKE ONE TABLET BY MOUTH EVERY DAY, Disp: 90 tablet, Rfl: 0    timolol (TIMOPTIC) 0 5 % ophthalmic solution, , Disp: , Rfl:     Allergies   Allergen Reactions    Sulfa Antibiotics        OBJECTIVE  Vitals:   Vitals:    06/15/22 1201   BP: 128/82   BP Location: Left arm   Patient Position: Sitting   Pulse: 90   Temp: 97 5 °F (36 4 °C)   TempSrc: Temporal   SpO2: 97%   Weight: 68 7 kg (151 lb 6 4 oz)   Height: 5' 3" (1 6 m)         Physical Exam  Vitals and nursing note reviewed  Exam conducted with a chaperone present  Constitutional:       General: She is not in acute distress  Appearance: She is well-developed  HENT:      Head: Normocephalic and atraumatic  Pulmonary:      Effort: Pulmonary effort is normal    Chest:   Breasts:      Right: No inverted nipple, mass, nipple discharge, skin change or tenderness  Left: No inverted nipple, mass, nipple discharge, skin change or tenderness  Genitourinary:     Exam position: Lithotomy position  Pubic Area: No rash  Labia:         Right: No rash or lesion  Left: No rash or lesion  Urethra: No urethral swelling  Vagina: No vaginal discharge or bleeding  Cervix: No cervical motion tenderness or discharge  Uterus: Not tender  Adnexa:         Right: No mass, tenderness or fullness            Left: No mass, tenderness or fullness  Rectum: Normal    Neurological:      Mental Status: She is alert                      MD Akil [facility] [address] [facility]   6/15/2022  12:36 PM

## 2022-06-16 ENCOUNTER — TELEPHONE (OUTPATIENT)
Dept: FAMILY MEDICINE CLINIC | Facility: CLINIC | Age: 67
End: 2022-06-16

## 2022-06-16 LAB
HPV HR 12 DNA CVX QL NAA+PROBE: NEGATIVE
HPV16 DNA CVX QL NAA+PROBE: NEGATIVE
HPV18 DNA CVX QL NAA+PROBE: NEGATIVE

## 2022-06-16 NOTE — TELEPHONE ENCOUNTER
I am not sure how to change it  I was told she would be a Welcome to [facility]

## 2022-06-21 LAB
LAB AP GYN PRIMARY INTERPRETATION: NORMAL
Lab: NORMAL

## 2022-07-11 ENCOUNTER — TELEPHONE (OUTPATIENT)
Dept: SURGICAL ONCOLOGY | Facility: CLINIC | Age: 67
End: 2022-07-11

## 2022-07-11 NOTE — TELEPHONE ENCOUNTER
 Hello, can I please speak to (patient name) this is (enter your name here) calling from [facility] practice) to remind you of your appointment on (7/13 1:30pm) at [facility]  I am calling to review our no-show/cancelation policy and masking policy  Do you have a few minutes? We ask that you come at least 15 minutes early for your appointment to complete all paperwork  However, If you are up to 20 minutes late for your appointment, we may need to reschedule you  Any lateness to an appointment may result in an shorted visit, for our providers to offer you the most out of your consult, please arrive early  We require at least 24-hour notice for cancelations and if you miss your appointment 3 times, we may unfortunately not be able to reschedule any future visits  We ask that you please call our office in the event you are feeling ill as we may need to reschedule your appointment  You are allowed to bring only one visitor with you to your appointment, if your visitor is not feeling well we ask that you don't bring them  Our current masking policy is: if you are vaccinated masking is optional, if you are unvaccinated masking is required  We look forward to seeing you at your upcoming appointment! Attempted calling, Pau Lam answered and then call got disconnected  Called again and spoke with Pau Lam, she was agreeable

## 2022-07-13 ENCOUNTER — CONSULT (OUTPATIENT)
Dept: SURGICAL ONCOLOGY | Facility: CLINIC | Age: 67
End: 2022-07-13
Payer: MEDICARE

## 2022-07-13 VITALS
HEIGHT: 63 IN | SYSTOLIC BLOOD PRESSURE: 117 MMHG | RESPIRATION RATE: 18 BRPM | TEMPERATURE: 96.8 F | WEIGHT: 147 LBS | OXYGEN SATURATION: 97 % | HEART RATE: 54 BPM | BODY MASS INDEX: 26.05 KG/M2 | DIASTOLIC BLOOD PRESSURE: 68 MMHG

## 2022-07-13 DIAGNOSIS — E83.52 HYPERCALCEMIA: ICD-10-CM

## 2022-07-13 DIAGNOSIS — E21.3 HYPERPARATHYROIDISM (HCC): Primary | ICD-10-CM

## 2022-07-13 PROCEDURE — 99203 OFFICE O/P NEW LOW 30 MIN: CPT | Performed by: SURGERY

## 2022-07-13 NOTE — PROGRESS NOTES
Surgical Oncology Consult       [address] [facility],[address]  [facility]  [address]  [facility] [address]    Vaishali Segal [facility]  1955  [id number]  [address] [address] [facility]  [facility] [place]  [address] [address]    Chief Complaint   Patient presents with    Consult       Assessment/Plan:    No problem-specific Assessment & Plan notes found for this encounter  Diagnoses and all orders for this visit:    Hyperparathyroidism [facility])  -     Ambulatory referral to Surgical Oncology  -     CT parathyroid study w wo contrast; Future    Hypercalcemia        Advance Care Planning/Advance Directives:  Discussed disease status, cancer treatment plans and/or cancer treatment goals with the patient  Oncology History    No history exists  History of Present Illness:  61-year-old woman referred for workup of primary hyperparathyroidism  She is relatively asymptomatic  However, she was found have calcium levels are elevated over time  PTH levels were also found to be somewhat elevated  Workup included sestamibi scan and ultrasound which revealed a left-sided thyroid like nodule  This was biopsy confirming Barton category 2 lesion  No obvious parathyroid target was found however  No history of kidney stones  No accessory history of fatigue  Mental status is normal  No personal/family hx of endocrine malignancies  Review of Systems   Constitutional: Negative  HENT: Negative  Eyes: Negative  Respiratory: Negative  Cardiovascular: Negative  Gastrointestinal: Negative  Endocrine: Negative  Genitourinary: Negative  Musculoskeletal: Negative  Skin: Negative  Allergic/Immunologic: Negative  Neurological: Negative  Hematological: Negative  Psychiatric/Behavioral: Negative  All other systems reviewed and are negative          Patient Active Problem List   Diagnosis  High cholesterol    Current every day smoker    Environmental allergies    Well woman exam with routine gynecological exam    Hyperparathyroidism ([email or web address] )    Hypercalcemia    Post-menopausal    Osteopenia    Anxiety    Benzodiazepine contract exists    Moderate episode of recurrent major depressive disorder ([email or web address] )    Other osteoporosis without current pathological fracture     Past Medical History:   Diagnosis Date    Depression     Hyperlipidemia     last assessed - [id number]     Past Surgical History:   Procedure Laterality Date     SECTION      US GUIDED THYROID BIOPSY  2022     Family History   Problem Relation Age of Onset    Cancer Mother     Cancer Father     Cancer Sister     Breast cancer Sister     No Known Problems Maternal Grandmother     No Known Problems Paternal Grandmother     No Known Problems Maternal Aunt     No Known Problems Maternal Aunt     No Known Problems Maternal Aunt     No Known Problems Paternal Aunt     No Known Problems Paternal Aunt      Social History     Socioeconomic History    Marital status: /Civil Union     Spouse name: Not on file    Number of children: Not on file    Years of education: Not on file    Highest education level: Not on file   Occupational History    Not on file   Tobacco Use    Smoking status: Current Every Day Smoker     Packs/day: 0 50     Years: 30 00     Pack years: 15 00     Types: Cigarettes    Smokeless tobacco: Never Used   Vaping Use    Vaping Use: Never used   Substance and Sexual Activity    Alcohol use: No    Drug use: No    Sexual activity: Not on file   Other Topics Concern    Not on file   Social History Narrative    Stress at home     Social Determinants of Health     Financial Resource Strain: Not on file   Food Insecurity: Not on file   Transportation Needs: Not on file   Physical Activity: Not on file   Stress: Not on file   Social Connections: Not on file   Intimate Partner Violence: Not on file   Housing Stability: Not on file       Current Outpatient Medications:     alendronate (FOSAMAX) 70 mg tablet, Take 1 tablet (70 mg total) by mouth every 7 days, Disp: 12 tablet, Rfl: 2    atorvastatin (LIPITOR) 20 mg tablet, Take 1 tablet (20 mg total) by mouth daily, Disp: 90 tablet, Rfl: 3    Cholecalciferol 50 MCG (2000 UT) CAPS, Take by mouth daily, Disp: , Rfl:     fluticasone (FLONASE) 50 mcg/act nasal spray, 2 sprays into each nostril daily, Disp: , Rfl:     LORazepam (ATIVAN) 0 5 mg tablet, Take 1 tablet (0 5 mg total) by mouth every 8 (eight) hours as needed for anxiety, Disp: 30 tablet, Rfl: 0    sertraline (ZOLOFT) 50 mg tablet, TAKE ONE TABLET BY MOUTH EVERY DAY, Disp: 90 tablet, Rfl: 0    timolol (TIMOPTIC) 0 5 % ophthalmic solution, , Disp: , Rfl:   Allergies   Allergen Reactions    Sulfa Antibiotics      Vitals:    07/13/22 1330   BP: 117/68   Pulse: (!) 54   Resp: 18   Temp: (!) 96 8 °F (36 °C)   SpO2: 97%       Physical Exam  Constitutional:       Appearance: Normal appearance  HENT:      Head: Normocephalic and atraumatic  Right Ear: External ear normal       Left Ear: External ear normal    Eyes:      Extraocular Movements: Extraocular movements intact  Pupils: Pupils are equal, round, and reactive to light  Cardiovascular:      Rate and Rhythm: Normal rate and regular rhythm  Heart sounds: Normal heart sounds  Pulmonary:      Effort: Pulmonary effort is normal       Breath sounds: Normal breath sounds  Abdominal:      General: Abdomen is flat  Palpations: Abdomen is soft  Musculoskeletal:         General: Normal range of motion  Cervical back: Normal range of motion and neck supple  Skin:     General: Skin is warm and dry  Neurological:      General: No focal deficit present  Mental Status: She is alert and oriented to person, place, and time     Psychiatric:         Mood and Affect: Mood normal          Behavior: Behavior normal  Thought Content: Thought content normal          Pathology:  Non-gynecologic Cytology                          Case: [id number]                                   Authorizing Provider: JAVON Trotter   Collected:           06/09/2022 1134               Ordering Location:     [facility]        Received:            06/09/2022 1228                                      [facility]                                                           Pathologist:           Roosevelt Perry DO                                                      Specimens:   A) - Thyroid, Left, lower                                                                            B) - Thyroid, Left                                                                         Final Diagnosis   A  and B  Thyroid, Left Lower, FNAB (ThinPrep and Smears): - Benign ([place] Category II)  See Note  - Borderline adequate specimen, showing few scattered clusters of benign follicular cells and rare macrophages  Electronically signed by Roosevelt Perry DO on 6/13/2022 at 12:20 PM   Note    As reported in the [address] [place] [address] for Reporting Thyroid Cytopathology*, the diagnostic category of "benign/negative for malignancy" carries a 0-3% risk of malignancy being found in subsequent resections (in the few studies of patients with benign FNA results that were followed long-term, a false negative rate of 0-3% was reported), with the usual management being clinical follow-up supplemented by ultrasonography (US) as indicated  Repeat FNA may be indicated for nodules showing significant growth or developing US abnormalities   Ultimately, clinical/imaging correlation for this patient is needed in arriving at the actual management plan      *The [place] System for Reporting Thyroid Cytopathology, Ashley Alvarado, 2018 (2nd ed )          Labs:  Lab Results   Component Value Date    PTH 98 0 (H) 04/01/2022    CALCIUM 10 7 (H) 04/01/2022    PHOS 2 9 10/12/2018         Imaging  PARATHYROID SCAN     INDICATION:  E21 3: Hyperparathyroidism, unspecified     COMPARISON:  None      TECHNIQUE:   Following the intravenous administration of 27 4 mCi Tc-99m Cardiolite, anterior and bilateral anterior oblique projection images of the neck and mediastinum were obtained at approximately 10 minutes post injection followed at 2 hours post   injection by static anterior and bilateral oblique projections as well as SPECT images in coronal, sagittal and axial projections       FINDINGS: Immediate planar images demonstrate asymmetric enlargement of the left thyroid as compared to the right  Delayed planar images demonstrate washout of most of the radiotracer from the thyroid without definite persistent foci      Delayed SPECT images, series 1003 image 1, demonstrate asymmetric nodular enlargement of the left thyroid as compared to the right  Along the posterior right inferior pole, there is also a persistent nodular focus of activity which may represent either   an exophytic thyroid nodule or possibly parathyroid adenoma      IMPRESSION:     1  Along the posterior right inferior pole, there is a persistent nodular focus of activity which may represent either an exophytic thyroid nodule or possibly parathyroid adenoma  Recommend ultrasound evaluation  2   Asymmetric nodular enlargement of the left thyroid as compared to the right    This may also be further evaluated with ultrasound         Workstation performed: [id number]   [facility]     INDICATION:    E21 3: Hyperparathyroidism, unspecified      COMPARISON:  Parathyroid scan 5/6/2022     TECHNIQUE:   Ultrasound of the thyroid was performed with a high frequency linear transducer in transverse and sagittal planes including volumetric imaging sweeps as well as traditional still imaging technique      FINDINGS:  Homogeneous thyroid echotexture bilaterally      Right lobe: 4 6 x 1 7 x 1  6 cm  Volume 6 2 mL  Left lobe:  5 7 x 2 8 x 2 5 cm  Volume 19 3 mL  Isthmus: 0 2  cm      Nodule #1  Image 23  RIGHT midgland nodule measuring 1 1 x 1 1 x 1 0 cm  COMPOSITION:  2 points, solid or almost completely solid   ECHOGENICITY:  1 point, hyperechoic or isoechoic  SHAPE:  0 points, wider-than-tall  MARGIN: 0 points, smooth  ECHOGENIC FOCI:  0 points, none or large comet-tail artifacts  TI-RADS Classification: TR 3 (3 points), FNA if >2 5 cm  Follow if >1 5 cm      Nodule #2  Image 27  RIGHT lower pole nodule measuring 0 8 x 0 6 x 0 8 cm  COMPOSITION:  2 points, could not be determined do to calcification  ECHOGENICITY:  1 point, echogenicity cannot be determined  SHAPE:  0 points, wider-than-tall  MARGIN: 0 points, smooth  ECHOGENIC FOCI:  2 points, peripheral(rim) calcifications  TI-RADS Classification: TR 5 (7 or > points), Highly suspicious  FNA if > 1 cm  Follow if > 0 5 cm      Nodule #3  Image 44  Isthmus measuring 1 8 x 0 6 x 1 4 cm  COMPOSITION:  2 points, solid or almost completely solid   ECHOGENICITY:  1 point, hyperechoic or isoechoic  SHAPE:  0 points, wider-than-tall  MARGIN: 0 points, smooth  ECHOGENIC FOCI:  0 points, none or large comet-tail artifacts  TI-RADS Classification: TR 3 (3 points), FNA if >2 5 cm  Follow if >1 5 cm      Nodule #4  Image 74  LEFT upper pole nodule measuring 1 2 x 0 7 x 1 0 cm  COMPOSITION:  2 points, solid or almost completely solid   ECHOGENICITY:  1 point, hyperechoic or isoechoic  SHAPE:  0 points, wider-than-tall  MARGIN: 0 points, smooth  ECHOGENIC FOCI:  0 points, none or large comet-tail artifacts  TI-RADS Classification: TR 3 (3 points), FNA if >2 5 cm  Follow if >1 5 cm      Nodule #5  Image 80  LEFT lower pole nodule measuring 2 9 x 2 5 x 2 2 cm  COMPOSITION:  2 points, solid or almost completely solid   ECHOGENICITY:  1 point, hyperechoic or isoechoic  SHAPE:  0 points, wider-than-tall  MARGIN: 0 points, smooth  ECHOGENIC FOCI:  0 points, none or large comet-tail artifacts  TI-RADS Classification: TR 3 (3 points), FNA if >2 5 cm  Follow if >1 5 cm      There are additional nodules of lesser size and/or TI-RADS score  These do not necessitate additional evaluation based on ACR criteria       IMPRESSION:  Somewhat heterogeneous thyroid gland with multiple nodules, the largest of which are enumerated above        Nodule 5 (2 9 cm left lower pole nodule) warrants biopsy  Nodule 2 (0 8 cm right lower pole) warrants follow-up sonography in 1 year  Nodule 3 (1 8 cm Isthmus) warrants follow-up sonography in 1 year      The study was marked in EPIC for significant notification            Reference: ACR Thyroid Imaging, Reporting and Data System (TI-RADS): White Paper of the [employer]  J AM Diaz Radiol [id number];[id number]  (additional recommendations based on American Thyroid Association 2015 guidelines )        Workstation performed: [id number]     I reviewed the above laboratory and imaging data  Discussion/Summary:  Suspected primary hyperparathyroidism, negative imaging studies thus far  Will obtain CT scan of neck to look for potential target  Follow-up here once results available for review to discuss next step in treatment, including potential surgery

## 2022-07-13 NOTE — LETTER
July 13, 2022     Yousuf Porter [address] [place] [address]    Patient: Byron Younger   YOB: 1955   Date of Visit: 7/13/2022       Dear Dr Juan Choudhary: Thank you for referring Amira Bergre to me for evaluation  Below are my notes for this consultation  If you have questions, please do not hesitate to call me  I look forward to following your patient along with you  Sincerely,        Benji Montesinos MD        CC: MD Mirta Andre MD Nile Lamp, MD  7/13/2022  2:11 PM  Sign when Signing Visit               Surgical Oncology Consult       [address] [address] [place] [address]    Ritikasyed Campuzanokrzysztof Major  1955  [id number]  [address] [facility],[address]  [facility] [place]  [address] [facility] [address]    Chief Complaint   Patient presents with    Consult       Assessment/Plan:    No problem-specific Assessment & Plan notes found for this encounter  Diagnoses and all orders for this visit:    Hyperparathyroidism [facility])  -     Ambulatory referral to Surgical Oncology  -     CT parathyroid study w wo contrast; Future    Hypercalcemia        Advance Care Planning/Advance Directives:  Discussed disease status, cancer treatment plans and/or cancer treatment goals with the patient  Oncology History    No history exists  History of Present Illness:  63-year-old woman referred for workup of primary hyperparathyroidism  She is relatively asymptomatic  However, she was found have calcium levels are elevated over time  PTH levels were also found to be somewhat elevated  Workup included sestamibi scan and ultrasound which revealed a left-sided thyroid like nodule  This was biopsy confirming [place] category 2 lesion  No obvious parathyroid target was found however  No history of kidney stones    No accessory history of fatigue  Mental status is normal  No personal/family hx of endocrine malignancies  Review of Systems   Constitutional: Negative  HENT: Negative  Eyes: Negative  Respiratory: Negative  Cardiovascular: Negative  Gastrointestinal: Negative  Endocrine: Negative  Genitourinary: Negative  Musculoskeletal: Negative  Skin: Negative  Allergic/Immunologic: Negative  Neurological: Negative  Hematological: Negative  Psychiatric/Behavioral: Negative  All other systems reviewed and are negative          Patient Active Problem List   Diagnosis    High cholesterol    Current every day smoker    Environmental allergies    Well woman exam with routine gynecological exam    Hyperparathyroidism ([facility] [address] )    Hypercalcemia    Post-menopausal    Osteopenia    Anxiety    Benzodiazepine contract exists    Moderate episode of recurrent major depressive disorder ([facility] [address] )    Other osteoporosis without current pathological fracture     Past Medical History:   Diagnosis Date    Depression     Hyperlipidemia     last assessed - [id number]     Past Surgical History:   Procedure Laterality Date     SECTION      US GUIDED THYROID BIOPSY  2022     Family History   Problem Relation Age of Onset    Cancer Mother     Cancer Father     Cancer Sister     Breast cancer Sister     No Known Problems Maternal Grandmother     No Known Problems Paternal Grandmother     No Known Problems Maternal Aunt     No Known Problems Maternal Aunt     No Known Problems Maternal Aunt     No Known Problems Paternal Aunt     No Known Problems Paternal Aunt      Social History     Socioeconomic History    Marital status: /Civil Union     Spouse name: Not on file    Number of children: Not on file    Years of education: Not on file    Highest education level: Not on file   Occupational History    Not on file   Tobacco Use    Smoking status: Current Every Day Smoker     Packs/day: 0 50 Years: 30 00     Pack years: 15 00     Types: Cigarettes    Smokeless tobacco: Never Used   Vaping Use    Vaping Use: Never used   Substance and Sexual Activity    Alcohol use: No    Drug use: No    Sexual activity: Not on file   Other Topics Concern    Not on file   Social History Narrative    Stress at home     Social Determinants of Health     Financial Resource Strain: Not on file   Food Insecurity: Not on file   Transportation Needs: Not on file   Physical Activity: Not on file   Stress: Not on file   Social Connections: Not on file   Intimate Partner Violence: Not on file   Housing Stability: Not on file       Current Outpatient Medications:     alendronate (FOSAMAX) 70 mg tablet, Take 1 tablet (70 mg total) by mouth every 7 days, Disp: 12 tablet, Rfl: 2    atorvastatin (LIPITOR) 20 mg tablet, Take 1 tablet (20 mg total) by mouth daily, Disp: 90 tablet, Rfl: 3    Cholecalciferol 50 MCG (2000 UT) CAPS, Take by mouth daily, Disp: , Rfl:     fluticasone (FLONASE) 50 mcg/act nasal spray, 2 sprays into each nostril daily, Disp: , Rfl:     LORazepam (ATIVAN) 0 5 mg tablet, Take 1 tablet (0 5 mg total) by mouth every 8 (eight) hours as needed for anxiety, Disp: 30 tablet, Rfl: 0    sertraline (ZOLOFT) 50 mg tablet, TAKE ONE TABLET BY MOUTH EVERY DAY, Disp: 90 tablet, Rfl: 0    timolol (TIMOPTIC) 0 5 % ophthalmic solution, , Disp: , Rfl:   Allergies   Allergen Reactions    Sulfa Antibiotics      Vitals:    07/13/22 1330   BP: 117/68   Pulse: (!) 54   Resp: 18   Temp: (!) 96 8 °F (36 °C)   SpO2: 97%       Physical Exam  Constitutional:       Appearance: Normal appearance  HENT:      Head: Normocephalic and atraumatic  Right Ear: External ear normal       Left Ear: External ear normal    Eyes:      Extraocular Movements: Extraocular movements intact  Pupils: Pupils are equal, round, and reactive to light  Cardiovascular:      Rate and Rhythm: Normal rate and regular rhythm        Heart sounds: Normal heart sounds  Pulmonary:      Effort: Pulmonary effort is normal       Breath sounds: Normal breath sounds  Abdominal:      General: Abdomen is flat  Palpations: Abdomen is soft  Musculoskeletal:         General: Normal range of motion  Cervical back: Normal range of motion and neck supple  Skin:     General: Skin is warm and dry  Neurological:      General: No focal deficit present  Mental Status: She is alert and oriented to person, place, and time  Psychiatric:         Mood and Affect: Mood normal          Behavior: Behavior normal          Thought Content: Thought content normal          Pathology:  Non-gynecologic Cytology                          Case: [id number]                                   Authorizing Provider: JAVON Garcia   Collected:           06/09/2022 1134               Ordering Location:     [facility]        Received:            06/09/2022 1228                                      [facility]                                                           Pathologist:           Valeria Goodman DO                                                      Specimens:   A) - Thyroid, Left, lower                                                                            B) - Thyroid, Left                                                                         Final Diagnosis   A  and B  Thyroid, Left Lower, FNAB (ThinPrep and Smears): - Benign ([place] Category II)  See Note  - Borderline adequate specimen, showing few scattered clusters of benign follicular cells and rare macrophages     Electronically signed by Valeria Goodman DO on 6/13/2022 at 12:20 PM   Note    As reported in the [facility] for Reporting Thyroid Cytopathology*, the diagnostic category of "benign/negative for malignancy" carries a 0-3% risk of malignancy being found in subsequent resections (in the few studies of patients with benign FNA results that were followed long-term, a false negative rate of 0-3% was reported), with the usual management being clinical follow-up supplemented by ultrasonography (US) as indicated  Repeat FNA may be indicated for nodules showing significant growth or developing US abnormalities  Ultimately, clinical/imaging correlation for this patient is needed in arriving at the actual management plan      *The [facility] for Reporting Thyroid Cytopathology, Lucía Serrato Fix ), 2018 (2nd ed )          Labs:  Lab Results   Component Value Date    PTH 98 0 (H) 04/01/2022    CALCIUM 10 7 (H) 04/01/2022    PHOS 2 9 10/12/2018         Imaging  PARATHYROID SCAN     INDICATION:  E21 3: Hyperparathyroidism, unspecified     COMPARISON:  None      TECHNIQUE:   Following the intravenous administration of 27 4 mCi Tc-99m Cardiolite, anterior and bilateral anterior oblique projection images of the neck and mediastinum were obtained at approximately 10 minutes post injection followed at 2 hours post   injection by static anterior and bilateral oblique projections as well as SPECT images in coronal, sagittal and axial projections       FINDINGS: Immediate planar images demonstrate asymmetric enlargement of the left thyroid as compared to the right  Delayed planar images demonstrate washout of most of the radiotracer from the thyroid without definite persistent foci      Delayed SPECT images, series 1003 image 1, demonstrate asymmetric nodular enlargement of the left thyroid as compared to the right  Along the posterior right inferior pole, there is also a persistent nodular focus of activity which may represent either   an exophytic thyroid nodule or possibly parathyroid adenoma      IMPRESSION:     1  Along the posterior right inferior pole, there is a persistent nodular focus of activity which may represent either an exophytic thyroid nodule or possibly parathyroid adenoma  Recommend ultrasound evaluation    2   Asymmetric nodular enlargement of the left thyroid as compared to the right  This may also be further evaluated with ultrasound         Workstation performed: [id number]   [id number]TR[facility]     INDICATION:    E21 3: Hyperparathyroidism, unspecified      COMPARISON:  Parathyroid scan 5/6/2022     TECHNIQUE:   Ultrasound of the thyroid was performed with a high frequency linear transducer in transverse and sagittal planes including volumetric imaging sweeps as well as traditional still imaging technique      FINDINGS:  Homogeneous thyroid echotexture bilaterally      Right lobe: 4 6 x 1 7 x 1 6 cm  Volume 6 2 mL  Left lobe:  5 7 x 2 8 x 2 5 cm  Volume 19 3 mL  Isthmus: 0 2  cm      Nodule #1  Image 23  RIGHT midgland nodule measuring 1 1 x 1 1 x 1 0 cm  COMPOSITION:  2 points, solid or almost completely solid   ECHOGENICITY:  1 point, hyperechoic or isoechoic  SHAPE:  0 points, wider-than-tall  MARGIN: 0 points, smooth  ECHOGENIC FOCI:  0 points, none or large comet-tail artifacts  TI-RADS Classification: TR 3 (3 points), FNA if >2 5 cm  Follow if >1 5 cm      Nodule #2  Image 27  RIGHT lower pole nodule measuring 0 8 x 0 6 x 0 8 cm  COMPOSITION:  2 points, could not be determined do to calcification  ECHOGENICITY:  1 point, echogenicity cannot be determined  SHAPE:  0 points, wider-than-tall  MARGIN: 0 points, smooth  ECHOGENIC FOCI:  2 points, peripheral(rim) calcifications  TI-RADS Classification: TR 5 (7 or > points), Highly suspicious  FNA if > 1 cm  Follow if > 0 5 cm      Nodule #3  Image 44  Isthmus measuring 1 8 x 0 6 x 1 4 cm  COMPOSITION:  2 points, solid or almost completely solid   ECHOGENICITY:  1 point, hyperechoic or isoechoic  SHAPE:  0 points, wider-than-tall  MARGIN: 0 points, smooth  ECHOGENIC FOCI:  0 points, none or large comet-tail artifacts  TI-RADS Classification: TR 3 (3 points), FNA if >2 5 cm  Follow if >1 5 cm      Nodule #4  Image 74    LEFT upper pole nodule measuring 1 2 x 0 7 x 1 0 cm  COMPOSITION:  2 points, solid or almost completely solid   ECHOGENICITY:  1 point, hyperechoic or isoechoic  SHAPE:  0 points, wider-than-tall  MARGIN: 0 points, smooth  ECHOGENIC FOCI:  0 points, none or large comet-tail artifacts  TI-RADS Classification: TR 3 (3 points), FNA if >2 5 cm  Follow if >1 5 cm      Nodule #5  Image 80  LEFT lower pole nodule measuring 2 9 x 2 5 x 2 2 cm  COMPOSITION:  2 points, solid or almost completely solid   ECHOGENICITY:  1 point, hyperechoic or isoechoic  SHAPE:  0 points, wider-than-tall  MARGIN: 0 points, smooth  ECHOGENIC FOCI:  0 points, none or large comet-tail artifacts  TI-RADS Classification: TR 3 (3 points), FNA if >2 5 cm  Follow if >1 5 cm      There are additional nodules of lesser size and/or TI-RADS score  These do not necessitate additional evaluation based on ACR criteria       IMPRESSION:  Somewhat heterogeneous thyroid gland with multiple nodules, the largest of which are enumerated above        Nodule 5 (2 9 cm left lower pole nodule) warrants biopsy  Nodule 2 (0 8 cm right lower pole) warrants follow-up sonography in 1 year  Nodule 3 (1 8 cm Isthmus) warrants follow-up sonography in 1 year      The study was marked in EPIC for significant notification            Reference: ACR Thyroid Imaging, Reporting and Data System (TI-RADS): White Paper of the [employer]  J AM Diaz Radiol 1502;45:[id number]  (additional recommendations based on American Thyroid Association 2015 guidelines )        Workstation performed: [id number]     I reviewed the above laboratory and imaging data  Discussion/Summary:  Suspected primary hyperparathyroidism, negative imaging studies thus far  Will obtain CT scan of neck to look for potential target  Follow-up here once results available for review to discuss next step in treatment, including potential surgery

## 2022-07-20 ENCOUNTER — HOSPITAL ENCOUNTER (OUTPATIENT)
Dept: CT IMAGING | Facility: HOSPITAL | Age: 67
Discharge: HOME | End: 2022-07-20
Payer: MEDICARE

## 2022-07-20 DIAGNOSIS — E21.3 HYPERPARATHYROIDISM (HCC): ICD-10-CM

## 2022-07-20 PROCEDURE — G1004 CDSM NDSC: HCPCS

## 2022-07-20 PROCEDURE — 70492 CT SFT TSUE NCK W/O & W/DYE: CPT

## 2022-07-20 RX ADMIN — IOHEXOL 85 ML: 350 INJECTION, SOLUTION INTRAVENOUS at 14:06

## 2022-09-02 DIAGNOSIS — E78.00 HIGH CHOLESTEROL: ICD-10-CM

## 2022-09-02 RX ORDER — ATORVASTATIN CALCIUM 20 MG/1
TABLET, FILM COATED ORAL
Qty: 90 TABLET | Refills: 3 | Status: SHIPPED | OUTPATIENT
Start: 2022-09-02

## 2022-09-14 ENCOUNTER — OFFICE VISIT (OUTPATIENT)
Dept: FAMILY MEDICINE CLINIC | Facility: CLINIC | Age: 67
End: 2022-09-14
Payer: MEDICARE

## 2022-09-14 VITALS
DIASTOLIC BLOOD PRESSURE: 80 MMHG | OXYGEN SATURATION: 96 % | SYSTOLIC BLOOD PRESSURE: 122 MMHG | HEIGHT: 63 IN | HEART RATE: 84 BPM | BODY MASS INDEX: 27.14 KG/M2 | TEMPERATURE: 97.8 F | WEIGHT: 153.2 LBS

## 2022-09-14 DIAGNOSIS — Z12.11 COLON CANCER SCREENING: ICD-10-CM

## 2022-09-14 DIAGNOSIS — F17.200 CURRENT EVERY DAY SMOKER: ICD-10-CM

## 2022-09-14 DIAGNOSIS — F33.1 MODERATE EPISODE OF RECURRENT MAJOR DEPRESSIVE DISORDER (HCC): ICD-10-CM

## 2022-09-14 DIAGNOSIS — Z28.21 INFLUENZA VACCINATION DECLINED: ICD-10-CM

## 2022-09-14 DIAGNOSIS — Z28.21 PNEUMOCOCCAL VACCINATION DECLINED: ICD-10-CM

## 2022-09-14 DIAGNOSIS — Z12.31 ENCOUNTER FOR SCREENING MAMMOGRAM FOR BREAST CANCER: ICD-10-CM

## 2022-09-14 DIAGNOSIS — E21.3 HYPERPARATHYROIDISM (HCC): ICD-10-CM

## 2022-09-14 DIAGNOSIS — Z00.00 WELCOME TO MEDICARE PREVENTIVE VISIT: Primary | ICD-10-CM

## 2022-09-14 DIAGNOSIS — Z53.20 LUNG CANCER SCREENING DECLINED BY PATIENT: ICD-10-CM

## 2022-09-14 DIAGNOSIS — E78.00 HIGH CHOLESTEROL: ICD-10-CM

## 2022-09-14 PROBLEM — Z79.899 BENZODIAZEPINE CONTRACT EXISTS: Status: RESOLVED | Noted: 2021-04-01 | Resolved: 2022-09-14

## 2022-09-14 PROBLEM — F41.9 ANXIETY: Status: RESOLVED | Noted: 2020-02-20 | Resolved: 2022-09-14

## 2022-09-14 PROCEDURE — G0402 INITIAL PREVENTIVE EXAM: HCPCS | Performed by: FAMILY MEDICINE

## 2022-09-14 PROCEDURE — G0403 EKG FOR INITIAL PREVENT EXAM: HCPCS | Performed by: FAMILY MEDICINE

## 2022-09-14 NOTE — PROGRESS NOTES
Assessment and Plan:     Problem List Items Addressed This Visit        Endocrine    Hyperparathyroidism ([address] )     Will touch base with Dr Lazaro Snow regarding CT findings          Relevant Orders    PTH, intact       Other    High cholesterol    Relevant Orders    Comprehensive metabolic panel    Lipid Panel with Direct LDL reflex    Current every day smoker     declined lung cancer screening         Moderate episode of recurrent major depressive disorder [facility])     Patient is doing much better and is off Zoloft  Influenza vaccination declined      Other Visit Diagnoses     Welcome to Medicare preventive visit    -  Primary    Relevant Orders    POCT ECG (Completed)    Encounter for screening mammogram for breast cancer        Relevant Orders    Mammo screening bilateral w 3d & cad    Lung cancer screening declined by patient        Colon cancer screening        Relevant Orders    Occult Bloood,Fecal Immunochemical        BMI Counseling: Body mass index is 27 14 kg/m²  The BMI is above normal  Nutrition recommendations include encouraging healthy choices of fruits and vegetables  Exercise recommendations include exercising 3-5 times per week  Rationale for BMI follow-up plan is due to patient being overweight or obese  Preventive health issues were discussed with patient, and age appropriate screening tests were ordered as noted in patient's After Visit Summary  Personalized health advice and appropriate referrals for health education or preventive services given if needed, as noted in patient's After Visit Summary  History of Present Illness:     Patient presents for a Medicare Wellness Visit    She is doing very well - states she met a "friend" is not depressed anymore  She is not on Sertraline anymore  She says she never heard back about the CT of her parathyroid glands done in July  She had seen Dr Lazaro Snow       Patient Care Team:  Gary Garcia MD as PCP - Eliu Abreu MD as PCP - Endocrinology (Endocrinology)  Stella Jose MD as PCP - [facility]  Stella Jose [facility] as PCP [facility]  Roro Canales MD (Oncology)     Review of Systems:     Review of Systems   Constitutional: Negative for activity change, appetite change, chills, fatigue, fever and unexpected weight change  HENT: Negative for congestion, ear discharge, ear pain, postnasal drip, sinus pressure and sore throat  Eyes: Negative for discharge and visual disturbance  Respiratory: Negative for cough, shortness of breath and wheezing  Cardiovascular: Negative for chest pain, palpitations and leg swelling  Gastrointestinal: Negative for abdominal pain, constipation, diarrhea, nausea and vomiting  Endocrine: Negative for cold intolerance, heat intolerance, polydipsia and polyuria  Genitourinary: Negative for difficulty urinating and frequency  Musculoskeletal: Negative for arthralgias, back pain, joint swelling and myalgias  Skin: Negative for rash  Neurological: Negative for dizziness, weakness, light-headedness, numbness and headaches  Hematological: Negative for adenopathy  Psychiatric/Behavioral: Negative for behavioral problems, confusion, dysphoric mood, sleep disturbance and suicidal ideas  The patient is not nervous/anxious           Problem List:     Patient Active Problem List   Diagnosis    High cholesterol    Current every day smoker    Environmental allergies    Well woman exam with routine gynecological exam    [facility] [address]    Hypercalcemia    Post-menopausal    Osteopenia    Moderate episode of recurrent major depressive disorder ([facility] )    Other osteoporosis without current pathological fracture    Influenza vaccination declined      Past Medical and Surgical History:     Past Medical History:   Diagnosis Date    Depression     Hyperlipidemia     last assessed -      Past Surgical History:   Procedure Laterality Date     SECTION      US GUIDED THYROID BIOPSY  6/9/2022      Family History:     Family History   Problem Relation Age of Onset    Cancer Mother     Cancer Father     Cancer Sister     Breast cancer Sister     No Known Problems Maternal Grandmother     No Known Problems Paternal Grandmother     No Known Problems Maternal Aunt     No Known Problems Maternal Aunt     No Known Problems Maternal Aunt     No Known Problems Paternal Aunt     No Known Problems Paternal Aunt       Social History:     Social History     Socioeconomic History    Marital status: /Civil Union     Spouse name: None    Number of children: None    Years of education: None    Highest education level: None   Occupational History    None   Tobacco Use    Smoking status: Current Every Day Smoker     Packs/day: 0 50     Years: 30 00     Pack years: 15 00     Types: Cigarettes    Smokeless tobacco: Never Used   Vaping Use    Vaping Use: Never used   Substance and Sexual Activity    Alcohol use: No    Drug use: No    Sexual activity: None   Other Topics Concern    None   Social History Narrative    Stress at home     Social Determinants of Health     Financial Resource Strain: Low Risk     Difficulty of Paying Living Expenses: Not hard at all   Food Insecurity: Not on file   Transportation Needs: No Transportation Needs    Lack of Transportation (Medical): No    Lack of Transportation (Non-Medical):  No   Physical Activity: Not on file   Stress: Not on file   Social Connections: Not on file   Intimate Partner Violence: Not on file   Housing Stability: Not on file      Medications and Allergies:     Current Outpatient Medications   Medication Sig Dispense Refill    alendronate (FOSAMAX) 70 mg tablet Take 1 tablet (70 mg total) by mouth every 7 days 12 tablet 2    atorvastatin (LIPITOR) 20 mg tablet TAKE ONE TABLET BY MOUTH EVERY DAY 90 tablet 3    Cholecalciferol 50 MCG (2000 UT) CAPS Take by mouth daily      fluticasone (FLONASE) 50 mcg/act nasal spray 2 sprays into each nostril daily      timolol (TIMOPTIC) 0 5 % ophthalmic solution        No current facility-administered medications for this visit  Allergies   Allergen Reactions    Sulfa Antibiotics       Immunizations:     Immunization History   Administered Date(s) Administered    COVID-19 MODERNA VACC 0 5 ML IM 04/22/2021, 05/24/2021    Tdap 08/22/2014      Health Maintenance:         Topic Date Due    HIV Screening  Never done    Lung Cancer Screening  Never done    Colorectal Cancer Screening  04/06/2019    Breast Cancer Screening: Mammogram  11/29/2022    Cervical Cancer Screening  06/15/2027    Hepatitis C Screening  Completed         Topic Date Due    COVID-19 Vaccine (3 - Booster for [employer] [place] series) 10/24/2021      Medicare Screening Tests and Risk Assessments:     Raya Win is here for her Welcome to Medicare visit  Health Risk Assessment:   Patient rates overall health as good  Patient feels that their physical health rating is same  Patient is very satisfied with their life  Eyesight was rated as same  Hearing was rated as same  Patient feels that their emotional and mental health rating is same  Patients states they are sometimes angry  Patient states they are sometimes unusually tired/fatigued  Pain experienced in the last 7 days has been none  Patient states that she has experienced no weight loss or gain in last 6 months  Depression Screening:   PHQ-9 Score: 0      Fall Risk Screening: In the past year, patient has experienced: no history of falling in past year      Urinary Incontinence Screening:   Patient has not leaked urine accidently in the last six months  Home Safety:  Patient does not have trouble with stairs inside or outside of their home  Patient has working smoke alarms and has working carbon monoxide detector  Home safety hazards include: none  Nutrition:   Current diet is Regular       Medications:   Patient is not currently taking any over-the-counter supplements  Patient is able to manage medications  Activities of Daily Living (ADLs)/Instrumental Activities of Daily Living (IADLs):   Walk and transfer into and out of bed and chair?: Yes  Dress and groom yourself?: Yes    Bathe or shower yourself?: Yes    Feed yourself? Yes  Do your laundry/housekeeping?: Yes  Manage your money, pay your bills and track your expenses?: Yes  Make your own meals?: Yes    Do your own shopping?: Yes    Previous Hospitalizations:   Any hospitalizations or ED visits within the last 12 months?: No      Advance Care Planning:   Living will: No    Durable POA for healthcare: No    Advanced directive: No    Five wishes given: Yes      Cognitive Screening:   Provider or family/friend/caregiver concerned regarding cognition?: No    PREVENTIVE SCREENINGS      Cardiovascular Screening:    General: Screening Not Indicated and History Lipid Disorder      Diabetes Screening:     General: Screening Current      Colorectal Cancer Screening:     General: Screening Current      Breast Cancer Screening:     General: Screening Current      Cervical Cancer Screening:    General: Screening Not Indicated      Osteoporosis Screening:    General: Screening Not Indicated and History Osteoporosis      Abdominal Aortic Aneurysm (AAA) Screening:        General: Screening Not Indicated      Lung Cancer Screening:     General: Patient Declines and Risks and Benefits Discussed      Hepatitis C Screening:    General: Screening Current    Screening, Brief Intervention, and Referral to Treatment (SBIRT)    Screening  Typical number of drinks in a day: 0  Typical number of drinks in a week: 0  Interpretation: Low risk drinking behavior      Single Item Drug Screening:  How often have you used an illegal drug (including marijuana) or a prescription medication for non-medical reasons in the past year? never    Single Item Drug Screen Score: 0  Interpretation: Negative screen for possible drug use disorder    Brief Intervention  Alcohol & drug use screenings were reviewed  No concerns regarding substance use disorder identified  Other Counseling Topics:   Regular weightbearing exercise  Visual Acuity Screening    Right eye Left eye Both eyes   Without correction:      With correction: 20/25 20/20 20/20        Physical Exam:     /80 (BP Location: Left arm, Patient Position: Sitting)   Pulse 84   Temp 97 8 °F (36 6 °C) (Temporal)   Ht 5' 3" (1 6 m)   Wt 69 5 kg (153 lb 3 2 oz)   SpO2 96%   BMI 27 14 kg/m²     Physical Exam  Constitutional:       General: She is not in acute distress  Appearance: Normal appearance  She is well-developed  She is not ill-appearing, toxic-appearing or diaphoretic  HENT:      Head: Normocephalic and atraumatic  Right Ear: External ear normal       Left Ear: External ear normal       Mouth/Throat:      Pharynx: No oropharyngeal exudate  Eyes:      General: No scleral icterus  Right eye: No discharge  Left eye: No discharge  Conjunctiva/sclera: Conjunctivae normal       Pupils: Pupils are equal, round, and reactive to light  Neck:      Thyroid: No thyromegaly  Cardiovascular:      Rate and Rhythm: Normal rate and regular rhythm  Heart sounds: Normal heart sounds  No murmur heard  No friction rub  No gallop  Pulmonary:      Effort: Pulmonary effort is normal  No respiratory distress  Breath sounds: Normal breath sounds  No wheezing or rales  Chest:      Chest wall: No tenderness  Abdominal:      General: Bowel sounds are normal  There is no distension  Palpations: Abdomen is soft  There is no mass  Tenderness: There is no abdominal tenderness  There is no guarding or rebound  Hernia: No hernia is present  Lymphadenopathy:      Cervical: No cervical adenopathy  Skin:     General: Skin is warm  Findings: No rash     Neurological:      Mental Status: She is alert and oriented to person, place, and time  Cranial Nerves: No cranial nerve deficit  Psychiatric:         Mood and Affect: Mood normal  Affect is tearful  Behavior: Behavior normal          Thought Content: Thought content normal          Judgment: Judgment normal         EKG: sinus bradycardia      Lona Munoz MD

## 2022-09-19 ENCOUNTER — OFFICE VISIT (OUTPATIENT)
Dept: SURGICAL ONCOLOGY | Facility: CLINIC | Age: 67
End: 2022-09-19
Payer: MEDICARE

## 2022-09-19 VITALS
OXYGEN SATURATION: 98 % | TEMPERATURE: 96.7 F | DIASTOLIC BLOOD PRESSURE: 80 MMHG | SYSTOLIC BLOOD PRESSURE: 128 MMHG | RESPIRATION RATE: 16 BRPM | BODY MASS INDEX: 27.11 KG/M2 | WEIGHT: 153 LBS | HEIGHT: 63 IN | HEART RATE: 55 BPM

## 2022-09-19 DIAGNOSIS — E21.3 HYPERPARATHYROIDISM (HCC): Primary | ICD-10-CM

## 2022-09-19 PROCEDURE — 99214 OFFICE O/P EST MOD 30 MIN: CPT | Performed by: SURGERY

## 2022-09-19 RX ORDER — TRAMADOL HYDROCHLORIDE 50 MG/1
50 TABLET ORAL EVERY 6 HOURS PRN
Qty: 10 TABLET | Refills: 0 | Status: SHIPPED | OUTPATIENT
Start: 2022-09-19

## 2022-09-19 NOTE — LETTER
September 19, 2022     Debby Betts MD  [address]  [address] [facility]  [address]    Patient: Rocío Arndt   YOB: 1955   Date of Visit: 9/19/2022       Dear Dr General Austin: Thank you for referring Cordelia Fraser to me for evaluation  Below are my notes for this consultation  If you have questions, please do not hesitate to call me  I look forward to following your patient along with you  Sincerely,        Orestes Ambrose MD        CC: MD Orestes Byers MD  9/19/2022  9:43 AM  Sign when Signing Visit     Surgical Oncology Follow Up       [address] [facility] [address] [id number] [phone]    Rocío Yris  1955  [id number]  [address] [facility] [address] [address] [phone]    Chief Complaint   Patient presents with    Follow-up       Assessment/Plan:    No problem-specific Assessment & Plan notes found for this encounter  Diagnoses and all orders for this visit:    Hyperparathyroidism [facility])        Advance Care Planning/Advance Directives:  Discussed disease status, cancer treatment plans and/or cancer treatment goals with the patient  Oncology History    No history exists  History of Present Illness:  Patient is a 61-year-old woman here for primary hyperparathyroidism   -Interval History:  She had CT scan done to look for potential targets  She is now here to go over results and plan for surgery  Review of Systems:  Review of Systems   Constitutional: Positive for fatigue  HENT: Negative  Eyes: Negative  Respiratory: Negative  Cardiovascular: Negative  Endocrine: Negative  Genitourinary: Negative  Musculoskeletal: Negative  Skin: Negative  Allergic/Immunologic: Negative  Neurological: Negative  Hematological: Negative  Psychiatric/Behavioral: Negative          Patient Active Problem List   Diagnosis    High cholesterol    Current every day smoker    Environmental allergies    Well woman exam with routine gynecological exam    [facility] ([facility] [address]    Hypercalcemia    Post-menopausal    Osteopenia    Moderate episode of recurrent major depressive disorder ([facility] [address] )    Other osteoporosis without current pathological fracture    Influenza vaccination declined     Past Medical History:   Diagnosis Date    Depression     Hyperlipidemia     last assessed - [id number]     Past Surgical History:   Procedure Laterality Date     SECTION      US GUIDED THYROID BIOPSY  2022     Family History   Problem Relation Age of Onset    Cancer Mother     Cancer Father     Cancer Sister     Breast cancer Sister     No Known Problems Maternal Grandmother     No Known Problems Paternal Grandmother     No Known Problems Maternal Aunt     No Known Problems Maternal Aunt     No Known Problems Maternal Aunt     No Known Problems Paternal Aunt     No Known Problems Paternal Aunt      Social History     Socioeconomic History    Marital status: /Civil Union     Spouse name: Not on file    Number of children: Not on file    Years of education: Not on file    Highest education level: Not on file   Occupational History    Not on file   Tobacco Use    Smoking status: Current Every Day Smoker     Packs/day: 0 50     Years: 30 00     Pack years: 15 00     Types: Cigarettes    Smokeless tobacco: Never Used   Vaping Use    Vaping Use: Never used   Substance and Sexual Activity    Alcohol use: No    Drug use: No    Sexual activity: Not on file   Other Topics Concern    Not on file   Social History Narrative    Stress at home     Social Determinants of Health     Financial Resource Strain: Low Risk     Difficulty of Paying Living Expenses: Not hard at all   Food Insecurity: Not on file   Transportation Needs: No Transportation Needs    Lack of Transportation (Medical): No    Lack of Transportation (Non-Medical): No   Physical Activity: Not on file   Stress: Not on file   Social Connections: Not on file   Intimate Partner Violence: Not on file   Housing Stability: Not on file       Current Outpatient Medications:     alendronate (FOSAMAX) 70 mg tablet, Take 1 tablet (70 mg total) by mouth every 7 days, Disp: 12 tablet, Rfl: 2    atorvastatin (LIPITOR) 20 mg tablet, TAKE ONE TABLET BY MOUTH EVERY DAY, Disp: 90 tablet, Rfl: 3    Cholecalciferol 50 MCG (2000 UT) CAPS, Take by mouth daily, Disp: , Rfl:     fluticasone (FLONASE) 50 mcg/act nasal spray, 2 sprays into each nostril daily, Disp: , Rfl:     timolol (TIMOPTIC) 0 5 % ophthalmic solution, , Disp: , Rfl:   Allergies   Allergen Reactions    Sulfa Antibiotics      Vitals:    09/19/22 0901   BP: 128/80   Pulse: 55   Resp: 16   Temp: (!) 96 7 °F (35 9 °C)   SpO2: 98%       Physical Exam  Constitutional:       Appearance: Normal appearance  HENT:      Head: Normocephalic and atraumatic  Right Ear: External ear normal       Left Ear: External ear normal    Eyes:      Extraocular Movements: Extraocular movements intact  Pupils: Pupils are equal, round, and reactive to light  Cardiovascular:      Rate and Rhythm: Normal rate and regular rhythm  Heart sounds: Normal heart sounds  Pulmonary:      Effort: Pulmonary effort is normal       Breath sounds: Normal breath sounds  Abdominal:      General: Abdomen is flat  Palpations: Abdomen is soft  Musculoskeletal:         General: Normal range of motion  Cervical back: Normal range of motion and neck supple  Skin:     General: Skin is warm and dry  Neurological:      General: No focal deficit present  Mental Status: She is alert and oriented to person, place, and time     Psychiatric:         Mood and Affect: Mood normal          Behavior: Behavior normal            Results:  Labs:  Lab Results   Component Value Date    PTH 98 0 (H) 04/01/2022    CALCIUM 10 7 (H) 04/01/2022    PHOS 2 9 10/12/2018     Lab Results   Component Value Date    PTH 98 0 (H) 04/01/2022    CALCIUM 10 7 (H) 04/01/2022    PHOS 2 9 10/12/2018         Imaging  CTA  NECK  WITHOUT AND WITH CONTRAST FROM NAHID TO SKULL BASE     INDICATION: Hyperparathyroidism      COMPARISON:  Nuclear medicine parathyroid scan from May 6, 2022, ultrasound of the thyroid from May 13, 2022      TECHNIQUE:  Both noncontrast, contrast, and post contrast delayed images were performed according to standard parathyroid protocol (4D technique) Coronal and sagittal reconstructions were performed  3D reconstructions were performed on an independant   workstation, and are supplied for review  This examination, like all CT scans performed in the [facility], was performed utilizing techniques to minimize radiation dose exposure, including the use of iterative reconstruction and   automated exposure control  Rad dose 1381 96 mGy-cm      IV Contrast:  85 mL of iohexol (OMNIPAQUE)     FINDINGS:     SERIAL NONCONTRAST, POST CONTRAST AND DELAYS: There are 2 right-sided targeted lesions along the posterior mid to inferior and inferior thyroid poles        The more superior and smaller of the enhancing soft tissue nodules measures 5 mm (3:164) and demonstrates a rounded configuration with mild hypoattenuation to the adjacent thyroid parenchyma, early arterial enhancement and mild washout on delayed images  Therefore, imaging findings suggest this is a parathyroid adenoma with high confidence     The larger of the 2 is located inferior to the above-mentioned lesion and is more ovoid in configuration, measuring approximately 11 x 5 mm (3:152)  This lesion demonstrates iso to hypoattenution on noncontrast imaging and greater enhancement on delayed   rather than early arterial imaging    Therefore, imaging findings suggest this is a parathyroid adenoma with intermediate confidence      There are no left-sided targeted lesions      VASCULAR STRUCTURES:  Normal enhancement of the cervical vasculature        VISUALIZED BRAIN PARENCHYMA:  Normal      VISUALIZED PARANASAL SINUSES:  Normal      NASAL CAVITY AND NASOPHARYNX: Normal      SUPRAHYOID NECK:  Normal oropharynx, oral cavity, parapharyngeal and retropharyngeal spaces      INFRAHYOID NECK:     THYROID GLAND:   Heterogeneous thyroid gland with multiple nodules, some of which demonstrate partial rim calcification  The largest nodule is was identified in the left lower thyroid pole and was biopsied on June 9, 2022     LYMPH NODES:  No pathologic or enlarged adenopathy      MEDIASTINUM:  Unremarkable      BONY STRUCTURES  Normal      LUNG APICES:  Unremarkable      NASCET criteria was used to determine the degree of internal carotid artery diameter stenosis      IMPRESSION:     Two right-sided targeted lesions along the posterior and inferior right thyroid lobe  Imaging features are suspicious for parathyroid adenomas with high (5 mm) and intermediate (11 mm) confidence as described above                 Workstation performed: [id number]  I reviewed the above laboratory and imaging data      Discussion/Summary:  82-year-old woman, primary

## 2022-09-19 NOTE — PROGRESS NOTES
Surgical Oncology Follow Up       [address] [facility] [place]  [address] [facility] Yolanda Martin [facility] [address] [phone]    Cristóbal Dove  1955  [id number]  [address] [facility] SURGICAL ONCOLOGY [place] Goldberg  [address] [facility] [place] [facility] [address] [phone]    Chief Complaint   Patient presents with    Follow-up       Assessment/Plan:    No problem-specific Assessment & Plan notes found for this encounter  Diagnoses and all orders for this visit:    Hyperparathyroidism [facility])        Advance Care Planning/Advance Directives:  Discussed disease status, cancer treatment plans and/or cancer treatment goals with the patient  Oncology History    No history exists  History of Present Illness:  Patient is a 79-year-old woman here for primary hyperparathyroidism   -Interval History:  She had CT scan done to look for potential targets  She is now here to go over results and plan for surgery  Review of Systems:  Review of Systems   Constitutional: Positive for fatigue  HENT: Negative  Eyes: Negative  Respiratory: Negative  Cardiovascular: Negative  Endocrine: Negative  Genitourinary: Negative  Musculoskeletal: Negative  Skin: Negative  Allergic/Immunologic: Negative  Neurological: Negative  Hematological: Negative  Psychiatric/Behavioral: Negative          Patient Active Problem List   Diagnosis    High cholesterol    Current every day smoker    Environmental allergies    Well woman exam with routine gynecological exam    Hyperparathyroidism ([address] )    Hypercalcemia    Post-menopausal    Osteopenia    Moderate episode of recurrent major depressive disorder ([address] )    Other osteoporosis without current pathological fracture    Influenza vaccination declined     Past Medical History:   Diagnosis Date    Depression     Hyperlipidemia     last assessed - [id number]     Past Surgical History:   Procedure Laterality Date     SECTION      US GUIDED THYROID BIOPSY  2022     Family History   Problem Relation Age of Onset    Cancer Mother     Cancer Father     Cancer Sister     Breast cancer Sister     No Known Problems Maternal Grandmother     No Known Problems Paternal Grandmother     No Known Problems Maternal Aunt     No Known Problems Maternal Aunt     No Known Problems Maternal Aunt     No Known Problems Paternal Aunt     No Known Problems Paternal Aunt      Social History     Socioeconomic History    Marital status: /Civil Union     Spouse name: Not on file    Number of children: Not on file    Years of education: Not on file    Highest education level: Not on file   Occupational History    Not on file   Tobacco Use    Smoking status: Current Every Day Smoker     Packs/day: 0 50     Years: 30 00     Pack years: 15 00     Types: Cigarettes    Smokeless tobacco: Never Used   Vaping Use    Vaping Use: Never used   Substance and Sexual Activity    Alcohol use: No    Drug use: No    Sexual activity: Not on file   Other Topics Concern    Not on file   Social History Narrative    Stress at home     Social Determinants of Health     Financial Resource Strain: Low Risk     Difficulty of Paying Living Expenses: Not hard at all   Food Insecurity: Not on file   Transportation Needs: No Transportation Needs    Lack of Transportation (Medical): No    Lack of Transportation (Non-Medical):  No   Physical Activity: Not on file   Stress: Not on file   Social Connections: Not on file   Intimate Partner Violence: Not on file   Housing Stability: Not on file       Current Outpatient Medications:     alendronate (FOSAMAX) 70 mg tablet, Take 1 tablet (70 mg total) by mouth every 7 days, Disp: 12 tablet, Rfl: 2    atorvastatin (LIPITOR) 20 mg tablet, TAKE ONE TABLET BY MOUTH EVERY DAY, Disp: 90 tablet, Rfl: 3    Cholecalciferol 50 MCG (2000 UT) CAPS, Take by mouth daily, Disp: , Rfl:     fluticasone (FLONASE) 50 mcg/act nasal spray, 2 sprays into each nostril daily, Disp: , Rfl:     timolol (TIMOPTIC) 0 5 % ophthalmic solution, , Disp: , Rfl:   Allergies   Allergen Reactions    Sulfa Antibiotics      Vitals:    09/19/22 0901   BP: 128/80   Pulse: 55   Resp: 16   Temp: (!) 96 7 °F (35 9 °C)   SpO2: 98%       Physical Exam  Constitutional:       Appearance: Normal appearance  HENT:      Head: Normocephalic and atraumatic  Right Ear: External ear normal       Left Ear: External ear normal    Eyes:      Extraocular Movements: Extraocular movements intact  Pupils: Pupils are equal, round, and reactive to light  Cardiovascular:      Rate and Rhythm: Normal rate and regular rhythm  Heart sounds: Normal heart sounds  Pulmonary:      Effort: Pulmonary effort is normal       Breath sounds: Normal breath sounds  Abdominal:      General: Abdomen is flat  Palpations: Abdomen is soft  Musculoskeletal:         General: Normal range of motion  Cervical back: Normal range of motion and neck supple  Skin:     General: Skin is warm and dry  Neurological:      General: No focal deficit present  Mental Status: She is alert and oriented to person, place, and time     Psychiatric:         Mood and Affect: Mood normal          Behavior: Behavior normal            Results:  Labs:  Lab Results   Component Value Date    PTH 98 0 (H) 04/01/2022    CALCIUM 10 7 (H) 04/01/2022    PHOS 2 9 10/12/2018     Lab Results   Component Value Date    PTH 98 0 (H) 04/01/2022    CALCIUM 10 7 (H) 04/01/2022    PHOS 2 9 10/12/2018         Imaging  CTA  NECK  WITHOUT AND WITH CONTRAST FROM NAHID TO SKULL BASE     INDICATION: Hyperparathyroidism      COMPARISON:  Nuclear medicine parathyroid scan from May 6, 2022, ultrasound of the thyroid from May 13, 2022      TECHNIQUE:  Both noncontrast, contrast, and post contrast delayed images were performed according to standard parathyroid protocol (4D technique) Coronal and sagittal reconstructions were performed  3D reconstructions were performed on an independant   workstation, and are supplied for review  This examination, like all CT scans performed in the [facility], was performed utilizing techniques to minimize radiation dose exposure, including the use of iterative reconstruction and   automated exposure control  Rad dose 1381 96 mGy-cm      IV Contrast:  85 mL of iohexol (OMNIPAQUE)     FINDINGS:     SERIAL NONCONTRAST, POST CONTRAST AND DELAYS: There are 2 right-sided targeted lesions along the posterior mid to inferior and inferior thyroid poles        The more superior and smaller of the enhancing soft tissue nodules measures 5 mm (3:164) and demonstrates a rounded configuration with mild hypoattenuation to the adjacent thyroid parenchyma, early arterial enhancement and mild washout on delayed images  Therefore, imaging findings suggest this is a parathyroid adenoma with high confidence     The larger of the 2 is located inferior to the above-mentioned lesion and is more ovoid in configuration, measuring approximately 11 x 5 mm (3:152)  This lesion demonstrates iso to hypoattenution on noncontrast imaging and greater enhancement on delayed   rather than early arterial imaging  Therefore, imaging findings suggest this is a parathyroid adenoma with intermediate confidence      There are no left-sided targeted lesions      VASCULAR STRUCTURES:  Normal enhancement of the cervical vasculature        VISUALIZED BRAIN PARENCHYMA:  Normal      VISUALIZED PARANASAL SINUSES:  Normal      NASAL CAVITY AND NASOPHARYNX: Normal      SUPRAHYOID NECK:  Normal oropharynx, oral cavity, parapharyngeal and retropharyngeal spaces      INFRAHYOID NECK:     THYROID GLAND:   Heterogeneous thyroid gland with multiple nodules, some of which demonstrate partial rim calcification    The largest nodule is was identified in the left lower thyroid pole and was biopsied on June 9, 2022     LYMPH NODES:  No pathologic or enlarged adenopathy      MEDIASTINUM:  Unremarkable      BONY STRUCTURES  Normal      LUNG APICES:  Unremarkable      NASCET criteria was used to determine the degree of internal carotid artery diameter stenosis      IMPRESSION:     Two right-sided targeted lesions along the posterior and inferior right thyroid lobe  Imaging features are suspicious for parathyroid adenomas with high (5 mm) and intermediate (11 mm) confidence as described above                 Workstation performed: [id number]  I reviewed the above laboratory and imaging data      Discussion/Summary:  79-year-old woman, primary

## 2022-10-11 PROBLEM — Z01.419 WELL WOMAN EXAM WITH ROUTINE GYNECOLOGICAL EXAM: Status: RESOLVED | Noted: 2018-02-16 | Resolved: 2022-10-11

## 2022-10-24 DIAGNOSIS — M81.8 OTHER OSTEOPOROSIS WITHOUT CURRENT PATHOLOGICAL FRACTURE: ICD-10-CM

## 2022-10-25 RX ORDER — ALENDRONATE SODIUM 70 MG/1
TABLET ORAL
Qty: 12 TABLET | Refills: 2 | Status: SHIPPED | OUTPATIENT
Start: 2022-10-25

## 2022-11-30 ENCOUNTER — HOSPITAL ENCOUNTER (OUTPATIENT)
Dept: MAMMOGRAPHY | Facility: HOSPITAL | Age: 67
Discharge: HOME | End: 2022-11-30

## 2022-11-30 VITALS — HEIGHT: 63 IN | WEIGHT: 156 LBS | BODY MASS INDEX: 27.64 KG/M2

## 2022-11-30 DIAGNOSIS — Z12.31 ENCOUNTER FOR SCREENING MAMMOGRAM FOR BREAST CANCER: ICD-10-CM

## 2022-12-01 ENCOUNTER — TELEPHONE (OUTPATIENT)
Dept: SURGICAL ONCOLOGY | Facility: CLINIC | Age: 67
End: 2022-12-01

## 2022-12-01 NOTE — TELEPHONE ENCOUNTER
Patient called in to review PAT  Patient is scheduled to see PCP on 12/8  PCP will repeat EKG  Patient verbalized understanding to have chest x-ray and blood work done prior to pre-op appt on 12/13

## 2022-12-02 ENCOUNTER — LAB (OUTPATIENT)
Dept: LAB | Facility: CLINIC | Age: 67
End: 2022-12-02

## 2022-12-02 ENCOUNTER — CLINICAL SUPPORT (OUTPATIENT)
Dept: URGENT CARE | Facility: CLINIC | Age: 67
End: 2022-12-02

## 2022-12-02 ENCOUNTER — APPOINTMENT (OUTPATIENT)
Dept: RADIOLOGY | Facility: CLINIC | Age: 67
End: 2022-12-02

## 2022-12-02 DIAGNOSIS — R94.31 ABNORMAL EKG: Primary | ICD-10-CM

## 2022-12-02 DIAGNOSIS — E21.3 HYPERPARATHYROIDISM (HCC): ICD-10-CM

## 2022-12-02 LAB
ALBUMIN SERPL BCP-MCNC: 3.6 G/DL (ref 3.5–5)
ALP SERPL-CCNC: 62 U/L (ref 46–116)
ALT SERPL W P-5'-P-CCNC: 29 U/L (ref 12–78)
ANION GAP SERPL CALCULATED.3IONS-SCNC: 2 MMOL/L (ref 4–13)
AST SERPL W P-5'-P-CCNC: 13 U/L (ref 5–45)
ATRIAL RATE: 48 BPM
BASOPHILS # BLD AUTO: 0.04 THOUSANDS/ÂΜL (ref 0–0.1)
BASOPHILS NFR BLD AUTO: 1 % (ref 0–1)
BILIRUB SERPL-MCNC: 0.64 MG/DL (ref 0.2–1)
BUN SERPL-MCNC: 19 MG/DL (ref 5–25)
CALCIUM SERPL-MCNC: 10.7 MG/DL (ref 8.3–10.1)
CHLORIDE SERPL-SCNC: 109 MMOL/L (ref 96–108)
CO2 SERPL-SCNC: 29 MMOL/L (ref 21–32)
CREAT SERPL-MCNC: 0.84 MG/DL (ref 0.6–1.3)
EOSINOPHIL # BLD AUTO: 0.17 THOUSAND/ÂΜL (ref 0–0.61)
EOSINOPHIL NFR BLD AUTO: 3 % (ref 0–6)
ERYTHROCYTE [DISTWIDTH] IN BLOOD BY AUTOMATED COUNT: 13 % (ref 11.6–15.1)
GFR SERPL CREATININE-BSD FRML MDRD: 73 ML/MIN/1.73SQ M
GLUCOSE P FAST SERPL-MCNC: 99 MG/DL (ref 65–99)
HCT VFR BLD AUTO: 49 % (ref 34.8–46.1)
HGB BLD-MCNC: 15.6 G/DL (ref 11.5–15.4)
IMM GRANULOCYTES # BLD AUTO: 0.01 THOUSAND/UL (ref 0–0.2)
IMM GRANULOCYTES NFR BLD AUTO: 0 % (ref 0–2)
LYMPHOCYTES # BLD AUTO: 1.67 THOUSANDS/ÂΜL (ref 0.6–4.47)
LYMPHOCYTES NFR BLD AUTO: 31 % (ref 14–44)
MCH RBC QN AUTO: 31 PG (ref 26.8–34.3)
MCHC RBC AUTO-ENTMCNC: 31.8 G/DL (ref 31.4–37.4)
MCV RBC AUTO: 97 FL (ref 82–98)
MONOCYTES # BLD AUTO: 0.58 THOUSAND/ÂΜL (ref 0.17–1.22)
MONOCYTES NFR BLD AUTO: 11 % (ref 4–12)
NEUTROPHILS # BLD AUTO: 2.96 THOUSANDS/ÂΜL (ref 1.85–7.62)
NEUTS SEG NFR BLD AUTO: 54 % (ref 43–75)
NRBC BLD AUTO-RTO: 0 /100 WBCS
P AXIS: 68 DEGREES
PLATELET # BLD AUTO: 248 THOUSANDS/UL (ref 149–390)
PMV BLD AUTO: 10.8 FL (ref 8.9–12.7)
POTASSIUM SERPL-SCNC: 4.1 MMOL/L (ref 3.5–5.3)
PR INTERVAL: 182 MS
PROT SERPL-MCNC: 7.8 G/DL (ref 6.4–8.4)
QRS AXIS: -23 DEGREES
QRSD INTERVAL: 88 MS
QT INTERVAL: 472 MS
QTC INTERVAL: 421 MS
RBC # BLD AUTO: 5.04 MILLION/UL (ref 3.81–5.12)
SODIUM SERPL-SCNC: 140 MMOL/L (ref 135–147)
T WAVE AXIS: 60 DEGREES
VENTRICULAR RATE: 48 BPM
WBC # BLD AUTO: 5.43 THOUSAND/UL (ref 4.31–10.16)

## 2022-12-08 ENCOUNTER — OFFICE VISIT (OUTPATIENT)
Dept: FAMILY MEDICINE CLINIC | Facility: CLINIC | Age: 67
End: 2022-12-08

## 2022-12-08 VITALS
OXYGEN SATURATION: 97 % | DIASTOLIC BLOOD PRESSURE: 82 MMHG | SYSTOLIC BLOOD PRESSURE: 112 MMHG | BODY MASS INDEX: 27.99 KG/M2 | WEIGHT: 158 LBS | TEMPERATURE: 98.3 F | HEART RATE: 59 BPM

## 2022-12-08 DIAGNOSIS — Z01.818 PRE-OP EXAMINATION: ICD-10-CM

## 2022-12-08 DIAGNOSIS — E21.3 HYPERPARATHYROIDISM (HCC): ICD-10-CM

## 2022-12-08 DIAGNOSIS — K59.00 CONSTIPATION, UNSPECIFIED CONSTIPATION TYPE: ICD-10-CM

## 2022-12-08 DIAGNOSIS — D35.1 PARATHYROID ADENOMA: Primary | ICD-10-CM

## 2022-12-08 DIAGNOSIS — Z28.21 INFLUENZA VACCINATION DECLINED: ICD-10-CM

## 2022-12-08 PROBLEM — F33.1 MODERATE EPISODE OF RECURRENT MAJOR DEPRESSIVE DISORDER (HCC): Status: RESOLVED | Noted: 2021-06-09 | Resolved: 2022-12-08

## 2022-12-08 NOTE — ASSESSMENT & PLAN NOTE
Advised to increase fiber, water in diet  If no relief, try Miralax daily  Advised to have colonoscopy - refuses  advised to complete FIT test which she says she will do

## 2022-12-08 NOTE — PROGRESS NOTES
Janusz Lombardo 1955 female MRN: [id number]        ASSESSMENT/PLAN  Problem List Items Addressed This Visit        Endocrine    Hyperparathyroidism [facility])       Other    Influenza vaccination declined    Constipation     Advised to increase fiber, water in diet  If no relief, try Miralax daily  Advised to have colonoscopy - refuses  advised to complete FIT test which she says she will do  Other Visit Diagnoses     Parathyroid adenoma    -  Primary    Pre-op examination              High Risk Surgery: no  CAD: no  CHF: no  CVD: no  DM2 on insulin: no  Cr>2: no        Mabel Major is undergoing a Low Risk surgery  She is at [address] for major adverse cardiac event (MACE)  She may proceed with surgery as planned without further workup  SUBJECTIVE  CC: Pre-op Exam (Rt parathyroidectomy on 1/3/CXR, Labs, ECG completed on 12/2)      HPI:  Janusz Lombardo is a 74 y o  female who is planning to undergo parathyroidectomy under general by Dr Panchito Castellano on 1/3/23  Patient has not had complications with anesthesia in the past   Functional status: good    Labs done - reviewed - CBC, CMP unremarkable other than elevated calcium  CXR - clear  EKG sinus bradycardia    She has one concern - she has constipation -she has tried milk of magnesia  Never had colonoscopy  Has not completed FIT test that was ordered  Review of Systems   Constitutional: Negative for activity change  Respiratory: Negative for chest tightness and shortness of breath  Cardiovascular: Negative for chest pain and leg swelling  Gastrointestinal: Positive for constipation  Negative for blood in stool  Neurological: Negative for headaches  Psychiatric/Behavioral: Negative for dysphoric mood  The patient is not nervous/anxious            Historical Information   The patient history was reviewed as follows:    Past Medical History:   Diagnosis Date   • Depression    • Hyperlipidemia     last assessed - [id number]     Past Surgical History: Procedure Laterality Date   •  SECTION     • US GUIDED THYROID BIOPSY  2022     Family History   Problem Relation Age of Onset   • Cancer Mother    • Cancer Father    • Cancer Sister    • Breast cancer Sister    • No Known Problems Maternal Grandmother    • No Known Problems Paternal Grandmother    • No Known Problems Maternal Aunt    • No Known Problems Maternal Aunt    • No Known Problems Maternal Aunt    • No Known Problems Paternal Aunt    • No Known Problems Paternal Aunt       Social History       Medications:     Current Outpatient Medications:   •  alendronate (FOSAMAX) 70 mg tablet, TAKE 1 TABLET BY MOUTH EVERY 7 DAYS , Disp: 12 tablet, Rfl: 2  •  atorvastatin (LIPITOR) 20 mg tablet, TAKE ONE TABLET BY MOUTH EVERY DAY, Disp: 90 tablet, Rfl: 3  •  Cholecalciferol 50 MCG (2000 UT) CAPS, Take by mouth daily, Disp: , Rfl:   •  fluticasone (FLONASE) 50 mcg/act nasal spray, 2 sprays into each nostril daily, Disp: , Rfl:   •  timolol (TIMOPTIC) 0 5 % ophthalmic solution, , Disp: , Rfl:   •  traMADol (Ultram) 50 mg tablet, Take 1 tablet (50 mg total) by mouth every 6 (six) hours as needed for moderate pain, Disp: 10 tablet, Rfl: 0  Allergies   Allergen Reactions   • Sulfa Antibiotics        OBJECTIVE    Vitals:   Vitals:    22 1058   BP: 112/82   BP Location: Left arm   Patient Position: Sitting   Cuff Size: Large   Pulse: 59   Temp: 98 3 °F (36 8 °C)   SpO2: 97%   Weight: 71 7 kg (158 lb)           Physical Exam  Vitals and nursing note reviewed  Constitutional:       General: She is not in acute distress  Appearance: Normal appearance  She is not ill-appearing, toxic-appearing or diaphoretic  HENT:      Head: Normocephalic and atraumatic  Right Ear: External ear normal       Left Ear: External ear normal    Cardiovascular:      Rate and Rhythm: Regular rhythm  Bradycardia present  Heart sounds: No murmur heard  No friction rub     Pulmonary:      Effort: Pulmonary effort is normal  No respiratory distress  Breath sounds: Normal breath sounds  No stridor  No wheezing, rhonchi or rales  Abdominal:      General: There is no distension  Palpations: Abdomen is soft  Tenderness: There is no abdominal tenderness  Musculoskeletal:         General: No swelling  Right lower leg: No edema  Left lower leg: No edema  Neurological:      General: No focal deficit present  Mental Status: She is alert  Mental status is at baseline  Cranial Nerves: No cranial nerve deficit  Psychiatric:         Attention and Perception: Attention normal          Mood and Affect: Mood normal          Speech: Speech normal          Behavior: Behavior normal          Thought Content:  Thought content normal          Judgment: Judgment normal         Howie Parker MD  [facility]  [address] [facility]   12/8/2022  11:24 AM

## 2022-12-14 ENCOUNTER — TELEPHONE (OUTPATIENT)
Dept: SURGICAL ONCOLOGY | Facility: CLINIC | Age: 67
End: 2022-12-14

## 2022-12-19 ENCOUNTER — OFFICE VISIT (OUTPATIENT)
Dept: SURGICAL ONCOLOGY | Facility: CLINIC | Age: 67
End: 2022-12-19

## 2022-12-19 VITALS
TEMPERATURE: 97.6 F | DIASTOLIC BLOOD PRESSURE: 79 MMHG | RESPIRATION RATE: 17 BRPM | HEART RATE: 77 BPM | OXYGEN SATURATION: 97 % | BODY MASS INDEX: 28.24 KG/M2 | SYSTOLIC BLOOD PRESSURE: 132 MMHG | WEIGHT: 159.4 LBS | HEIGHT: 63 IN

## 2022-12-19 DIAGNOSIS — Z01.818 PRE-OP EXAMINATION: ICD-10-CM

## 2022-12-19 DIAGNOSIS — E21.3 HYPERPARATHYROIDISM (HCC): Primary | ICD-10-CM

## 2022-12-19 NOTE — PROGRESS NOTES
Surgical Oncology Follow Up       [address] [facility] [place]  [address]  [facility] [id number] [phone]    Roberto Major  1955  [id number]  [address] [facility] [address] [id number] [phone]    Diagnoses and all orders for this visit:    Hyperparathyroidism [facility])    Pre-op examination        Chief Complaint   Patient presents with   • Follow-up         History of Present Illness: The patient returns to the office today for pre-operative history and physical examination  She is scheduled to undergo minimally-invasive parathyroidectomy on January 3, 2023, with Dr Monique Samuels  She denies any changes since her last visit in the office  She denies any SOB, cough, chest pain, palpitations, fever or chills  Pre-admission testing has been completed  Review of Systems   Constitutional: Negative  Negative for activity change, appetite change, chills, fatigue and fever  HENT: Negative  Respiratory: Negative  Negative for cough, chest tightness and shortness of breath  Cardiovascular: Negative  Negative for chest pain and palpitations  Gastrointestinal: Negative  Skin: Negative  Neurological: Negative  Hematological: Negative  Negative for adenopathy  Psychiatric/Behavioral: Negative                 Patient Active Problem List   Diagnosis   • High cholesterol   • Current every day smoker   • Environmental allergies   • Hyperparathyroidism ([address] )   • Hypercalcemia   • Post-menopausal   • Osteopenia   • Other osteoporosis without current pathological fracture   • Influenza vaccination declined   • Constipation     Past Medical History:   Diagnosis Date   • Depression    • Hyperlipidemia     last assessed -      Past Surgical History:   Procedure Laterality Date   •  SECTION     • US GUIDED THYROID BIOPSY  2022 Family History   Problem Relation Age of Onset   • Cancer Mother    • Cancer Father    • Cancer Sister    • Breast cancer Sister    • No Known Problems Maternal Grandmother    • No Known Problems Paternal Grandmother    • No Known Problems Maternal Aunt    • No Known Problems Maternal Aunt    • No Known Problems Maternal Aunt    • No Known Problems Paternal Aunt    • No Known Problems Paternal Aunt      Social History     Socioeconomic History   • Marital status: /Civil Union     Spouse name: Not on file   • Number of children: Not on file   • Years of education: Not on file   • Highest education level: Not on file   Occupational History   • Not on file   Tobacco Use   • Smoking status: Every Day     Packs/day: 0 50     Years: 30 00     Pack years: 15 00     Types: Cigarettes   • Smokeless tobacco: Never   Vaping Use   • Vaping Use: Never used   Substance and Sexual Activity   • Alcohol use: No   • Drug use: No   • Sexual activity: Not on file   Other Topics Concern   • Not on file   Social History Narrative    Stress at home     Social Determinants of Health     Financial Resource Strain: Low Risk    • Difficulty of Paying Living Expenses: Not hard at all   Food Insecurity: Not on file   Transportation Needs: No Transportation Needs   • Lack of Transportation (Medical): No   • Lack of Transportation (Non-Medical):  No   Physical Activity: Not on file   Stress: Not on file   Social Connections: Not on file   Intimate Partner Violence: Not on file   Housing Stability: Not on file       Current Outpatient Medications:   •  alendronate (FOSAMAX) 70 mg tablet, TAKE 1 TABLET BY MOUTH EVERY 7 DAYS , Disp: 12 tablet, Rfl: 2  •  atorvastatin (LIPITOR) 20 mg tablet, TAKE ONE TABLET BY MOUTH EVERY DAY, Disp: 90 tablet, Rfl: 3  •  Cholecalciferol 50 MCG (2000 UT) CAPS, Take by mouth daily, Disp: , Rfl:   •  fluticasone (FLONASE) 50 mcg/act nasal spray, 2 sprays into each nostril daily, Disp: , Rfl:   •  timolol (TIMOPTIC) 0 5 % ophthalmic solution, , Disp: , Rfl:   •  traMADol (Ultram) 50 mg tablet, Take 1 tablet (50 mg total) by mouth every 6 (six) hours as needed for moderate pain, Disp: 10 tablet, Rfl: 0  Allergies   Allergen Reactions   • Sulfa Antibiotics      Vitals:    12/19/22 1358   BP: 132/79   Pulse: 77   Resp: 17   Temp: 97 6 °F (36 4 °C)   SpO2: 97%       Physical Exam  Vitals reviewed  Constitutional:       General: She is not in acute distress  Appearance: Normal appearance  She is normal weight  She is not ill-appearing or toxic-appearing  HENT:      Head: Normocephalic and atraumatic  Nose: Nose normal       Mouth/Throat:      Mouth: Mucous membranes are moist    Eyes:      General: No scleral icterus  Extraocular Movements: Extraocular movements intact  Conjunctiva/sclera: Conjunctivae normal       Pupils: Pupils are equal, round, and reactive to light  Neck:      Vascular: No carotid bruit  Cardiovascular:      Rate and Rhythm: Normal rate and regular rhythm  Pulmonary:      Effort: Pulmonary effort is normal  No respiratory distress  Breath sounds: Wheezing present  No rhonchi  Musculoskeletal:         General: Normal range of motion  Cervical back: Normal range of motion and neck supple  No rigidity or tenderness  Lymphadenopathy:      Cervical: No cervical adenopathy  Skin:     General: Skin is warm  Neurological:      General: No focal deficit present  Mental Status: She is alert and oriented to person, place, and time  Psychiatric:         Mood and Affect: Mood normal          Behavior: Behavior normal          Thought Content:  Thought content normal          Judgment: Judgment normal            Labs:  Collected Updated Procedure    12/02/2022 0927 12/02/2022 1136 CBC and differential [[id number]]   (Abnormal)   Blood from Arm, Right    Component Value Units   WBC 5 43 Thousand/uL   RBC 5 04 Million/uL   Hemoglobin 15 6 High  g/dL   Hematocrit 49 0 High  %   MCV 97 fL   MCH 31 0 pg   MCHC 31 8 g/dL   RDW 13 0 %   MPV 10 8 fL   Platelets 686 Thousands/uL   nRBC 0 /100 WBCs   Neutrophils Relative 54 %   Immat GRANS % 0 %   Lymphocytes Relative 31 %   Monocytes Relative 11 %   Eosinophils Relative 3 %   Basophils Relative 1 %   Neutrophils Absolute 2 96 Thousands/µL   Immature Grans Absolute 0 01 Thousand/uL   Lymphocytes Absolute 1 67 Thousands/µL   Monocytes Absolute 0 58 Thousand/µL   Eosinophils Absolute 0 17 Thousand/µL   Basophils Absolute 0 04 Thousands/µL          12/02/2022 0927 12/02/2022 1152 Comprehensive metabolic panel [[id number]]    (Abnormal)   Blood from Arm, Right    Component Value Units   Sodium 140 mmol/L   Potassium 4 1 mmol/L   Chloride 109 High  mmol/L   CO2 29 mmol/L   ANION GAP 2 Low  mmol/L   BUN 19 mg/dL   Creatinine 0 84  mg/dL   Glucose, Fasting 99  mg/dL   Calcium 10 7 High  mg/dL   AST 13  U/L   ALT 29  U/L   Alkaline Phosphatase 62 U/L   Total Protein 7 8 g/dL   Albumin 3 6 g/dL   Total Bilirubin 0 64  mg/dL   eGFR 73 ml/min/1 73sq m                Imaging  XR chest pa & lateral    Result Date: 12/4/2022  Narrative: CHEST INDICATION:   E21 3: Hyperparathyroidism, unspecified  Preop  COMPARISON:  Parathyroid CT 7/20/2022  EXAM PERFORMED/VIEWS:  XR CHEST PA & LATERAL FINDINGS: Cardiomediastinal silhouette appears unremarkable  The lungs are clear  No pneumothorax or pleural effusion  Osseous structures appear within normal limits for patient age  Impression: No acute cardiopulmonary disease  Workstation performed: [id number]     ECG 12 lead  12/02/2022    Narrative & Impression   Sinus bradycardia  Cannot rule out prior  Septal infarct , age undetermined  however this may be related to lead placement  Otherwise normal ECG  No previous ECGs available  Confirmed by Marzella Cabot ([phone]) on 12/2/2022 11:22:44 AM       I reviewed the above laboratory and imaging data  Discussion/Summary:   This is a 73 y/o female who presents today for pre-op H&P  There are no new or worrisome findings on exam today  She is agreeable to proceed with surgery as planned, all questions were answered today

## 2022-12-19 NOTE — H&P (VIEW-ONLY)
Surgical Oncology Follow Up       [address] [facility]  [address] [facility] [place]  [place] [address] [facility] [id number] [phone]    Jaime Major  1955  [id number]  [facility] [place] [address] [id number] [phone]    Diagnoses and all orders for this visit:    Hyperparathyroidism [facility])    Pre-op examination        Chief Complaint   Patient presents with   • Follow-up         History of Present Illness: The patient returns to the office today for pre-operative history and physical examination  She is scheduled to undergo minimally-invasive parathyroidectomy on January 3, 2023, with Dr Lourdes Villa  She denies any changes since her last visit in the office  She denies any SOB, cough, chest pain, palpitations, fever or chills  Pre-admission testing has been completed  Review of Systems   Constitutional: Negative  Negative for activity change, appetite change, chills, fatigue and fever  HENT: Negative  Respiratory: Negative  Negative for cough, chest tightness and shortness of breath  Cardiovascular: Negative  Negative for chest pain and palpitations  Gastrointestinal: Negative  Skin: Negative  Neurological: Negative  Hematological: Negative  Negative for adenopathy  Psychiatric/Behavioral: Negative                 Patient Active Problem List   Diagnosis   • High cholesterol   • Current every day smoker   • Environmental allergies   • Hyperparathyroidism ([address] )   • Hypercalcemia   • Post-menopausal   • Osteopenia   • Other osteoporosis without current pathological fracture   • Influenza vaccination declined   • Constipation     Past Medical History:   Diagnosis Date   • Depression    • Hyperlipidemia     last assessed -      Past Surgical History:   Procedure Laterality Date   •  SECTION     • US GUIDED THYROID BIOPSY  2022 Family History   Problem Relation Age of Onset   • Cancer Mother    • Cancer Father    • Cancer Sister    • Breast cancer Sister    • No Known Problems Maternal Grandmother    • No Known Problems Paternal Grandmother    • No Known Problems Maternal Aunt    • No Known Problems Maternal Aunt    • No Known Problems Maternal Aunt    • No Known Problems Paternal Aunt    • No Known Problems Paternal Aunt      Social History     Socioeconomic History   • Marital status: /Civil Union     Spouse name: Not on file   • Number of children: Not on file   • Years of education: Not on file   • Highest education level: Not on file   Occupational History   • Not on file   Tobacco Use   • Smoking status: Every Day     Packs/day: 0 50     Years: 30 00     Pack years: 15 00     Types: Cigarettes   • Smokeless tobacco: Never   Vaping Use   • Vaping Use: Never used   Substance and Sexual Activity   • Alcohol use: No   • Drug use: No   • Sexual activity: Not on file   Other Topics Concern   • Not on file   Social History Narrative    Stress at home     Social Determinants of Health     Financial Resource Strain: Low Risk    • Difficulty of Paying Living Expenses: Not hard at all   Food Insecurity: Not on file   Transportation Needs: No Transportation Needs   • Lack of Transportation (Medical): No   • Lack of Transportation (Non-Medical):  No   Physical Activity: Not on file   Stress: Not on file   Social Connections: Not on file   Intimate Partner Violence: Not on file   Housing Stability: Not on file       Current Outpatient Medications:   •  alendronate (FOSAMAX) 70 mg tablet, TAKE 1 TABLET BY MOUTH EVERY 7 DAYS , Disp: 12 tablet, Rfl: 2  •  atorvastatin (LIPITOR) 20 mg tablet, TAKE ONE TABLET BY MOUTH EVERY DAY, Disp: 90 tablet, Rfl: 3  •  Cholecalciferol 50 MCG (2000 UT) CAPS, Take by mouth daily, Disp: , Rfl:   •  fluticasone (FLONASE) 50 mcg/act nasal spray, 2 sprays into each nostril daily, Disp: , Rfl:   •  timolol (TIMOPTIC) 0 5 % ophthalmic solution, , Disp: , Rfl:   •  traMADol (Ultram) 50 mg tablet, Take 1 tablet (50 mg total) by mouth every 6 (six) hours as needed for moderate pain, Disp: 10 tablet, Rfl: 0  Allergies   Allergen Reactions   • Sulfa Antibiotics      Vitals:    12/19/22 1358   BP: 132/79   Pulse: 77   Resp: 17   Temp: 97 6 °F (36 4 °C)   SpO2: 97%       Physical Exam  Vitals reviewed  Constitutional:       General: She is not in acute distress  Appearance: Normal appearance  She is normal weight  She is not ill-appearing or toxic-appearing  HENT:      Head: Normocephalic and atraumatic  Nose: Nose normal       Mouth/Throat:      Mouth: Mucous membranes are moist    Eyes:      General: No scleral icterus  Extraocular Movements: Extraocular movements intact  Conjunctiva/sclera: Conjunctivae normal       Pupils: Pupils are equal, round, and reactive to light  Neck:      Vascular: No carotid bruit  Cardiovascular:      Rate and Rhythm: Normal rate and regular rhythm  Pulmonary:      Effort: Pulmonary effort is normal  No respiratory distress  Breath sounds: Wheezing present  No rhonchi  Musculoskeletal:         General: Normal range of motion  Cervical back: Normal range of motion and neck supple  No rigidity or tenderness  Lymphadenopathy:      Cervical: No cervical adenopathy  Skin:     General: Skin is warm  Neurological:      General: No focal deficit present  Mental Status: She is alert and oriented to person, place, and time  Psychiatric:         Mood and Affect: Mood normal          Behavior: Behavior normal          Thought Content:  Thought content normal          Judgment: Judgment normal            Labs:  Collected Updated Procedure    12/02/2022 0927 12/02/2022 1136 CBC and differential [[id number]]   (Abnormal)   Blood from Arm, Right    Component Value Units   WBC 5 43 Thousand/uL   RBC 5 04 Million/uL   Hemoglobin 15 6 High  g/dL   Hematocrit 49 0 High  %   MCV 97 fL   MCH 31 0 pg   MCHC 31 8 g/dL   RDW 13 0 %   MPV 10 8 fL   Platelets 128 Thousands/uL   nRBC 0 /100 WBCs   Neutrophils Relative 54 %   Immat GRANS % 0 %   Lymphocytes Relative 31 %   Monocytes Relative 11 %   Eosinophils Relative 3 %   Basophils Relative 1 %   Neutrophils Absolute 2 96 Thousands/µL   Immature Grans Absolute 0 01 Thousand/uL   Lymphocytes Absolute 1 67 Thousands/µL   Monocytes Absolute 0 58 Thousand/µL   Eosinophils Absolute 0 17 Thousand/µL   Basophils Absolute 0 04 Thousands/µL          12/02/2022 0927 12/02/2022 1152 Comprehensive metabolic panel [[id number]]    (Abnormal)   Blood from Arm, Right    Component Value Units   Sodium 140 mmol/L   Potassium 4 1 mmol/L   Chloride 109 High  mmol/L   CO2 29 mmol/L   ANION GAP 2 Low  mmol/L   BUN 19 mg/dL   Creatinine 0 84  mg/dL   Glucose, Fasting 99  mg/dL   Calcium 10 7 High  mg/dL   AST 13  U/L   ALT 29  U/L   Alkaline Phosphatase 62 U/L   Total Protein 7 8 g/dL   Albumin 3 6 g/dL   Total Bilirubin 0 64  mg/dL   eGFR 73 ml/min/1 73sq m                Imaging  XR chest pa & lateral    Result Date: 12/4/2022  Narrative: CHEST INDICATION:   E21 3: Hyperparathyroidism, unspecified  Preop  COMPARISON:  Parathyroid CT 7/20/2022  EXAM PERFORMED/VIEWS:  XR CHEST PA & LATERAL FINDINGS: Cardiomediastinal silhouette appears unremarkable  The lungs are clear  No pneumothorax or pleural effusion  Osseous structures appear within normal limits for patient age  Impression: No acute cardiopulmonary disease  Workstation performed: [id number]     ECG 12 lead  12/02/2022    Narrative & Impression   Sinus bradycardia  Cannot rule out prior  Septal infarct , age undetermined  however this may be related to lead placement  Otherwise normal ECG  No previous ECGs available  Confirmed by [email or web address] ([phone]) on 12/2/2022 11:22:44 AM       I reviewed the above laboratory and imaging data  Discussion/Summary:   This is a 73 y/o female who presents today for pre-op H&P  There are no new or worrisome findings on exam today  She is agreeable to proceed with surgery as planned, all questions were answered today

## 2022-12-21 ENCOUNTER — TELEPHONE (OUTPATIENT)
Dept: ANESTHESIOLOGY | Facility: CLINIC | Age: 67
End: 2022-12-21

## 2022-12-21 DIAGNOSIS — T65.291A TOXIC EFFECT OF TOBACCO AND NICOTINE, ACCIDENTAL OR UNINTENTIONAL, INITIAL ENCOUNTER: Primary | ICD-10-CM

## 2022-12-23 ENCOUNTER — ANESTHESIA EVENT (OUTPATIENT)
Dept: PERIOP | Facility: HOSPITAL | Age: 67
End: 2022-12-23

## 2022-12-23 NOTE — PRE-PROCEDURE INSTRUCTIONS
Pre-Surgery Instructions:   Medication Instructions   • alendronate (FOSAMAX) 70 mg tablet Take regular schedule   • atorvastatin (LIPITOR) 20 mg tablet Take night before surgery   • Cholecalciferol 50 MCG (2000 UT) CAPS Stop taking 7 days prior to surgery  • timolol (TIMOPTIC) 0 5 % ophthalmic solution Take day of surgery  - Reviewed with patient, in detail, instructions from "My Surgical Experience"  - Instructed to avoid all OTC vitamins/supplements and NSAIDS for one week prior to surgery per anesthesia guidelines  Tylenol ok to take PRN  - Advised patient nothing eat or drink after midnight prior to surgery, except medications that he/she is to take morning DOS with only small sip of water     - Advised patient that Alvarado Patino will call with surgery arrival time and hospital directions the business day prior to surgery  Patient verbalized understanding of current visitor restrictions/masking guidelines and advised that he/she can confirm these at time of arrival call with Alvarado Patino      - Patient verbalized understanding and knows to call surgeon's office with any additional questions prior to surgery  Instructed to call surgeon's office in meantime with any new illnesses/exposure, patient verbalized understanding

## 2022-12-27 ENCOUNTER — TELEMEDICINE (OUTPATIENT)
Dept: ANESTHESIOLOGY | Facility: CLINIC | Age: 67
End: 2022-12-27

## 2022-12-27 DIAGNOSIS — F17.200 CURRENT EVERY DAY SMOKER: Primary | ICD-10-CM

## 2022-12-27 NOTE — PROGRESS NOTES
[facility]   Consult: Tobacco Therapy   Telemedicine Regular Visit    Verification of patient location:    Patient is located in the following state in which I hold an active license [place]    Assessment/Plan:  · 19-year-old female referred to [facility] for presurgery tobacco therapy  · Patient is an active smoker and would like to be smoke-free for surgery and recovery  · She is scheduled on  Case: [id number] Date/Time: 01/03/23 1440   Procedures:        MINIMALLY INVASIVE RIGHT PARATHYROIDECTOMY, POSSIBLE 4 GLAND EXPLORATION (Right: Neck)       MONITORING INTRAOPERATIVE PTH (PARATHYROID HORMONE) (Neck)   Anesthesia type: General   Diagnosis: Hyperparathy[address] ([facility] [address] ) [E21 3]   Pre-op diagnosis: Hyperparathyroidism ([facility] [address] ) [E21 3]   Location: [facility] OR ROOM 05 / [facility] MAIN OR   Surgeons: Tracy Garibay MD     Problem List Items Addressed This Visit        Other    Current every day smoker - Primary  · Patient is smoking 15 cigarettes a day  · Would like to attempt to wean on her own  · Will be using over-the-counter lollipops to help with urges  · Will be smoking a few puffs from a cigarette and then putting out, relighting same cigarette with next urge  · Decline nicotine replacement therapy   · Interested in therapy   · Follow up with [facility] 1 10 22@ 1000, phone call only              Reason for visit is   Chief Complaint   Patient presents with   • Nicotine Dependence   • Virtual Regular Visit        Encounter provider Evelia Price    Provider located at [address] [facility] [address]  [phone]      Recent Visits  Date Type Provider Dept   12/21/22 Telephone [address] recent visits within past 7 days and meeting all other requirements  Today's Visits  Date Type Provider Dept   12/27/22 [facility], [address] [place] [address] [facility]   Showing today's visits and meeting all other requirements  Future Appointments  No visits were found meeting these conditions  Showing future appointments within next 150 days and meeting all other requirements       The patient was identified by name and date of birth  Gale Demarco was informed that this is a telemedicine visit and that the visit is being conducted through Telephone  My office door was closed  No one else was in the room  She acknowledged consent and understanding of privacy and security of the video platform  The patient has agreed to participate and understands they can discontinue the visit at any time  Patient is aware this is a billable service  Subjective  Gale Demarco is a 74 y o  female who was referred to [facility] for presurgery tobacco therapy  She is an active smoker  She would like to be smoke-free for surgery and recovery  Met with patient over the telephone  She was unable to connect via video  Patient requested to complete appointment over telephone  Lives at home alone  She has a significant other  Patient started smoking in 1985  Her  was a smoker as well  Unfortunately he passed away 3 years ago  She feels that she has been smoking more since her  passed away  Smoking is her "go to"  Smoking is her "stress reliever"  She is unsure if she is ready to quit  She does smoke inside her house but only in the basement  She can go periods of time without smoking  She has 2 sons  They are aware that she smokes  She has no one that nags her about smoking  She has no smoking bodies  She is currently smoking 15 cigarettes a day  She is unsure if she is ready to quit  I did provide education on the benefits of quitting smoking before your operation and for your recovery  Today a variety of tobacco treatment options were discussed  Patient does not want to use nicotine replacement at this time  She would like to attempt to wean cigarettes on her own    Education provided on different methods to help control urges  We discussed using lollipops  We discussed using hard candy such as lifesavers  We discussed using gum  We also talked about alternative activities she could do when she has an urge such as doing a load of laundry, or reading a chapter in her book  Patient agreeable to try  Denies questions  Advantages and disadvantages of being smoke-free for surgery were discussed  Nicotine Dependence  Presents for initial visit  Preferred tobacco types include cigarettes  Preferred cigarette types include filtered  Her urge triggers include company of smokers and stress  She smokes < 1/2 a pack of cigarettes per day  Past treatments include nicotine patch  The treatment provided no relief  Compliance with prior treatments has been poor  Adelina Cole is thinking about quitting           Past Medical History:   Diagnosis Date   • Depression    • Hyperlipidemia     last assessed - 2016       Past Surgical History:   Procedure Laterality Date   •  SECTION     • US GUIDED THYROID BIOPSY  2022       Current Outpatient Medications   Medication Sig Dispense Refill   • alendronate (FOSAMAX) 70 mg tablet TAKE 1 TABLET BY MOUTH EVERY 7 DAYS  (Patient taking differently: Take 70 mg by mouth every 7 days Wednesday) 12 tablet 2   • atorvastatin (LIPITOR) 20 mg tablet TAKE ONE TABLET BY MOUTH EVERY DAY (Patient taking differently: Take 20 mg by mouth daily with dinner) 90 tablet 3   • Cholecalciferol 50 MCG (2000) CAPS Take by mouth daily     • fluticasone (FLONASE) 50 mcg/act nasal spray 2 sprays into each nostril if needed     • timolol (TIMOPTIC) 0 5 % ophthalmic solution Administer 1 drop to both eyes 2 (two) times a day     • traMADol (Ultram) 50 mg tablet Take 1 tablet (50 mg total) by mouth every 6 (six) hours as needed for moderate pain (Patient taking differently: Take 50 mg by mouth every 6 (six) hours as needed for moderate pain Post op) 10 tablet 0     No current facility-administered medications for this visit  Allergies   Allergen Reactions   • Sulfa Antibiotics Hives       Review of Systems   Constitutional: Negative for chills and fever  HENT: Negative for postnasal drip  Respiratory: Negative for cough  Cardiovascular: Negative for chest pain  Gastrointestinal: Negative for nausea and vomiting  Genitourinary: Negative for difficulty urinating  Skin: Negative for color change, pallor, rash and wound  Neurological: Negative for dizziness and headaches  Psychiatric/Behavioral: Negative for agitation, behavioral problems and confusion  Video Exam- we did not connect via video- patient decline   There were no vitals filed for this visit      Physical Exam     I spent 25 minutes directly with the patient during this visit

## 2022-12-27 NOTE — PROGRESS NOTES
[facility] due to quit smoking   Surgery Date:  01/03/2023    Drug Use: no   Alcohol: social       Patient started to smoke in 1985-  was a smoker as well  He passed away, it will be 3 years in Feb 20230- she has stated she has smoked more since her  passed away  Patient smokes inside her house but only in the basement  When she is with others or at other peoples house's she will not smoke at their house, or in front of them-- she hides it  Lauren Martínez, is a 74year old female who is retired, but works [employer]  She does have a routine- she will wake up, have her coffee, & a cigarette  If she is going to work she will not have a cigarette in the car  While she is on break at work she will have another cigarette, and also while she drives home from work as well  Once she is home, about to prepare dinner she will smoke, before and after supper  Why Quit? Health, just fed up with smoking   Patient does not remember what year or how what age she was when she tried to quit, she states she went cold turkey  Triggers: no triggers    PHQ9: 13   METS: 9 25   Sleep: 6 hours total     As of today, December 27,2022 she is only smoking about 4 cigarettes a day, patient does have a friend who keeps her company-- they met at [employer], he as well smokes a pipe, she says she enjoys his company and they do smoke together

## 2023-01-03 ENCOUNTER — ANESTHESIA (OUTPATIENT)
Dept: PERIOP | Facility: HOSPITAL | Age: 68
End: 2023-01-03

## 2023-01-03 ENCOUNTER — HOSPITAL ENCOUNTER (OUTPATIENT)
Facility: HOSPITAL | Age: 68
Setting detail: OUTPATIENT SURGERY
Discharge: HOME | End: 2023-01-03
Attending: SURGERY | Admitting: SURGERY

## 2023-01-03 VITALS
HEIGHT: 63 IN | DIASTOLIC BLOOD PRESSURE: 62 MMHG | OXYGEN SATURATION: 95 % | RESPIRATION RATE: 16 BRPM | BODY MASS INDEX: 28.17 KG/M2 | SYSTOLIC BLOOD PRESSURE: 96 MMHG | WEIGHT: 159 LBS | TEMPERATURE: 98.6 F | HEART RATE: 62 BPM

## 2023-01-03 DIAGNOSIS — E21.3 HYPERPARATHYROIDISM (HCC): ICD-10-CM

## 2023-01-03 LAB
CALCIUM SERPL-MCNC: 10 MG/DL (ref 8.3–10.1)
CALCIUM SERPL-MCNC: 9.8 MG/DL (ref 8.3–10.1)
CALCIUM SERPL-MCNC: 9.9 MG/DL (ref 8.3–10.1)
PTH-INTACT SERPL-MCNC: 102.1 PG/ML (ref 18.4–80.1)
PTH-INTACT SERPL-MCNC: 121.2 PG/ML (ref 18.4–80.1)
PTH-INTACT SERPL-MCNC: 123.1 PG/ML (ref 18.4–80.1)
PTH-INTACT SERPL-MCNC: 51.9 PG/ML (ref 18.4–80.1)
PTH-INTACT SERPL-MCNC: 63.8 PG/ML (ref 18.4–80.1)

## 2023-01-03 RX ORDER — ONDANSETRON 2 MG/ML
4 INJECTION INTRAMUSCULAR; INTRAVENOUS ONCE AS NEEDED
Status: DISCONTINUED | OUTPATIENT
Start: 2023-01-03 | End: 2023-01-03 | Stop reason: HOSPADM

## 2023-01-03 RX ORDER — ROCURONIUM BROMIDE 10 MG/ML
INJECTION, SOLUTION INTRAVENOUS AS NEEDED
Status: DISCONTINUED | OUTPATIENT
Start: 2023-01-03 | End: 2023-01-03

## 2023-01-03 RX ORDER — METOCLOPRAMIDE HYDROCHLORIDE 5 MG/ML
10 INJECTION INTRAMUSCULAR; INTRAVENOUS ONCE AS NEEDED
Status: DISCONTINUED | OUTPATIENT
Start: 2023-01-03 | End: 2023-01-03 | Stop reason: HOSPADM

## 2023-01-03 RX ORDER — MIDAZOLAM HYDROCHLORIDE 2 MG/2ML
INJECTION, SOLUTION INTRAMUSCULAR; INTRAVENOUS AS NEEDED
Status: DISCONTINUED | OUTPATIENT
Start: 2023-01-03 | End: 2023-01-03

## 2023-01-03 RX ORDER — ONDANSETRON 2 MG/ML
INJECTION INTRAMUSCULAR; INTRAVENOUS AS NEEDED
Status: DISCONTINUED | OUTPATIENT
Start: 2023-01-03 | End: 2023-01-03

## 2023-01-03 RX ORDER — MAGNESIUM HYDROXIDE 1200 MG/15ML
LIQUID ORAL AS NEEDED
Status: DISCONTINUED | OUTPATIENT
Start: 2023-01-03 | End: 2023-01-03 | Stop reason: HOSPADM

## 2023-01-03 RX ORDER — ALBUTEROL SULFATE 2.5 MG/3ML
2.5 SOLUTION RESPIRATORY (INHALATION) ONCE AS NEEDED
Status: DISCONTINUED | OUTPATIENT
Start: 2023-01-03 | End: 2023-01-03 | Stop reason: HOSPADM

## 2023-01-03 RX ORDER — FENTANYL CITRATE 50 UG/ML
INJECTION, SOLUTION INTRAMUSCULAR; INTRAVENOUS AS NEEDED
Status: DISCONTINUED | OUTPATIENT
Start: 2023-01-03 | End: 2023-01-03

## 2023-01-03 RX ORDER — LIDOCAINE HYDROCHLORIDE 20 MG/ML
INJECTION, SOLUTION EPIDURAL; INFILTRATION; INTRACAUDAL; PERINEURAL AS NEEDED
Status: DISCONTINUED | OUTPATIENT
Start: 2023-01-03 | End: 2023-01-03

## 2023-01-03 RX ORDER — FENTANYL CITRATE/PF 50 MCG/ML
50 SYRINGE (ML) INJECTION
Status: COMPLETED | OUTPATIENT
Start: 2023-01-03 | End: 2023-01-03

## 2023-01-03 RX ORDER — PHENYLEPHRINE HYDROCHLORIDE 10 MG/ML
INJECTION INTRAVENOUS AS NEEDED
Status: DISCONTINUED | OUTPATIENT
Start: 2023-01-03 | End: 2023-01-03

## 2023-01-03 RX ORDER — HYDROMORPHONE HCL/PF 1 MG/ML
0.5 SYRINGE (ML) INJECTION
Status: DISCONTINUED | OUTPATIENT
Start: 2023-01-03 | End: 2023-01-03 | Stop reason: HOSPADM

## 2023-01-03 RX ORDER — SODIUM CHLORIDE, SODIUM LACTATE, POTASSIUM CHLORIDE, CALCIUM CHLORIDE 600; 310; 30; 20 MG/100ML; MG/100ML; MG/100ML; MG/100ML
50 INJECTION, SOLUTION INTRAVENOUS CONTINUOUS
Status: DISCONTINUED | OUTPATIENT
Start: 2023-01-03 | End: 2023-01-03 | Stop reason: HOSPADM

## 2023-01-03 RX ORDER — PROPOFOL 10 MG/ML
INJECTION, EMULSION INTRAVENOUS AS NEEDED
Status: DISCONTINUED | OUTPATIENT
Start: 2023-01-03 | End: 2023-01-03

## 2023-01-03 RX ORDER — BUPIVACAINE HYDROCHLORIDE 2.5 MG/ML
INJECTION, SOLUTION EPIDURAL; INFILTRATION; INTRACAUDAL AS NEEDED
Status: DISCONTINUED | OUTPATIENT
Start: 2023-01-03 | End: 2023-01-03 | Stop reason: HOSPADM

## 2023-01-03 RX ORDER — SODIUM CHLORIDE, SODIUM LACTATE, POTASSIUM CHLORIDE, CALCIUM CHLORIDE 600; 310; 30; 20 MG/100ML; MG/100ML; MG/100ML; MG/100ML
INJECTION, SOLUTION INTRAVENOUS CONTINUOUS PRN
Status: DISCONTINUED | OUTPATIENT
Start: 2023-01-03 | End: 2023-01-03

## 2023-01-03 RX ORDER — DEXAMETHASONE SODIUM PHOSPHATE 10 MG/ML
INJECTION, SOLUTION INTRAMUSCULAR; INTRAVENOUS AS NEEDED
Status: DISCONTINUED | OUTPATIENT
Start: 2023-01-03 | End: 2023-01-03

## 2023-01-03 RX ADMIN — LIDOCAINE HYDROCHLORIDE 100 MG: 20 INJECTION, SOLUTION EPIDURAL; INFILTRATION; INTRACAUDAL; PERINEURAL at 13:49

## 2023-01-03 RX ADMIN — PHENYLEPHRINE HYDROCHLORIDE 30 MCG/MIN: 10 INJECTION INTRAVENOUS at 14:16

## 2023-01-03 RX ADMIN — FENTANYL CITRATE 100 MCG: 50 INJECTION INTRAMUSCULAR; INTRAVENOUS at 13:49

## 2023-01-03 RX ADMIN — SODIUM CHLORIDE, SODIUM LACTATE, POTASSIUM CHLORIDE, AND CALCIUM CHLORIDE 50 ML/HR: .6; .31; .03; .02 INJECTION, SOLUTION INTRAVENOUS at 16:20

## 2023-01-03 RX ADMIN — SUGAMMADEX 200 MG: 100 INJECTION, SOLUTION INTRAVENOUS at 15:38

## 2023-01-03 RX ADMIN — ONDANSETRON 4 MG: 2 INJECTION INTRAMUSCULAR; INTRAVENOUS at 15:36

## 2023-01-03 RX ADMIN — Medication 50 MCG: at 16:37

## 2023-01-03 RX ADMIN — FENTANYL CITRATE 50 MCG: 50 INJECTION INTRAMUSCULAR; INTRAVENOUS at 15:24

## 2023-01-03 RX ADMIN — Medication 50 MCG: at 16:52

## 2023-01-03 RX ADMIN — PHENYLEPHRINE HYDROCHLORIDE 100 MCG: 10 INJECTION INTRAVENOUS at 14:16

## 2023-01-03 RX ADMIN — SODIUM CHLORIDE, SODIUM LACTATE, POTASSIUM CHLORIDE, AND CALCIUM CHLORIDE: .6; .31; .03; .02 INJECTION, SOLUTION INTRAVENOUS at 13:40

## 2023-01-03 RX ADMIN — PHENYLEPHRINE HYDROCHLORIDE 100 MCG: 10 INJECTION INTRAVENOUS at 13:56

## 2023-01-03 RX ADMIN — MIDAZOLAM 2 MG: 1 INJECTION INTRAMUSCULAR; INTRAVENOUS at 13:40

## 2023-01-03 RX ADMIN — PROPOFOL 30 MG: 10 INJECTION, EMULSION INTRAVENOUS at 14:27

## 2023-01-03 RX ADMIN — PHENYLEPHRINE HYDROCHLORIDE 100 MCG: 10 INJECTION INTRAVENOUS at 13:59

## 2023-01-03 RX ADMIN — DEXAMETHASONE SODIUM PHOSPHATE 10 MG: 10 INJECTION, SOLUTION INTRAMUSCULAR; INTRAVENOUS at 13:52

## 2023-01-03 RX ADMIN — FENTANYL CITRATE 50 MCG: 50 INJECTION INTRAMUSCULAR; INTRAVENOUS at 14:25

## 2023-01-03 RX ADMIN — PROPOFOL 140 MG: 10 INJECTION, EMULSION INTRAVENOUS at 13:49

## 2023-01-03 RX ADMIN — PROPOFOL 30 MG: 10 INJECTION, EMULSION INTRAVENOUS at 15:54

## 2023-01-03 RX ADMIN — ROCURONIUM BROMIDE 50 MG: 10 INJECTION, SOLUTION INTRAVENOUS at 13:49

## 2023-01-03 NOTE — ANESTHESIA PROCEDURE NOTES
Arterial Line Insertion  Performed by: Tabatha Canas CRNA  Authorized by: Indy Bianchi MD   Consent: Verbal consent obtained  Written consent obtained  Risks and benefits: risks, benefits and alternatives were discussed  Consent given by: patient  Patient understanding: patient states understanding of the procedure being performed  Patient consent: the patient's understanding of the procedure matches consent given  Patient identity confirmed: arm band  Preparation: Patient was prepped and draped in the usual sterile fashion    Indications: hemodynamic monitoring  Orientation:  Left  Location: radial artery  Procedure Details:  Needle gauge: 20  Number of attempts: 2    Post-procedure:  Post-procedure: dressing applied  Waveform: good waveform  Patient tolerance: Patient tolerated the procedure well with no immediate complications

## 2023-01-03 NOTE — ANESTHESIA POSTPROCEDURE EVALUATION
Post-Op Assessment Note    CV Status:  Stable  Pain Score: 0    Pain management: adequate     Mental Status:  Sleepy   Hydration Status:  Stable   PONV Controlled:  Controlled   Airway Patency:  Patent      Post Op Vitals Reviewed: Yes      Staff: CRNA         No notable events documented      BP   119/71   Temp   97 3   Pulse  64   Resp   13   SpO2   98

## 2023-01-03 NOTE — OP NOTE
OPERATIVE REPORT  PATIENT NAME: Mendez Major    :  1955  MRN: [id number]  Pt Location: [facility] OR ROOM 05    SURGERY DATE: 1/3/2023    Surgeon(s) and Role:     * Louis Hagen MD - Primary     * Maria Isabel Gomez DO - Assisting    Preop Diagnosis:  Hyperparathyroidism ([address] ) [E21 3]    Post-Op Diagnosis Codes:     * Hyperparathyroidism ([address] ) [E21 3]    Procedure(s) (LRB):  MINIMALLY INVASIVE RIGHT PARATHYROIDECTOMY (Right)  MONITORING INTRAOPERATIVE PTH (PARATHYROID HORMONE) (N/A)    Specimen(s):  ID Type Source Tests Collected by Time Destination   1 : Right Superior Parathyroid? Tissue Parathyroid TISSUE EXAM Louis Hagen MD 1/3/2023 1447    2 : Right Inferior Tissue Parathyroid TISSUE EXAM Louis Hagen MD 1/3/2023 1453        Estimated Blood Loss:   Minimal    Drains:  * No LDAs found *    Anesthesia Type:   General    Operative Indications:  Hyperparathyroidism ([facility] [address] ) [E21 3]      Operative Findings:  680 right inferior parathyroid adenoma  Component      Latest Ref Rng & Units 1/3/2023 1/3/2023 1/3/2023 1/3/2023           1:20 PM  3:00 PM  3:00 PM  3:38 PM   PARATHYROID HORMONE      18 4 - 80 1 pg/mL 121 2 (H) 123 1 (H) 102 1 (H) 63 8     Component      Latest Ref Rng & Units 1/3/2023           3:54 PM   PARATHYROID HORMONE      18 4 - 80 1 pg/mL 30 5     Complications:   None    Procedure and Technique:  The patient was brought to the OR and identified by proper time-out  Following this she was intubated by the anesthesia team, then was prepped and draped with the neck exposed in the extended position  Local anesthesia was given, then a 3 cm incision was made sharply 2 finger breaths above the sternal notch  Cautery was used to dissect through the dermis subcutaneous tissue  Wheatlanner retractor was placed  The platysma was then transected with cautery  Strap muscles were then divided the midline  This exposed the surface of the thyroid   We dissected the strap muscles off the anterolateral aspect of the right thyroid lobe  This enabled us to dissect between the thyroid and the strap muscles  The thyroid gland was then retracted medially and anteriorly which exposed what appeared to be a large parathyroid gland along the inferior pole of the thyroid gland  This was dissected out from surrounding tissue in hemostatic fashion with cautery  Vascular pedicle was visualized  The pedicle was clipped  PTH levels were drawn at the start of the case, 0 min, 5 min, and 10 min after the case  Levels decreased appropriately to normal range upon 10 min post resection of the gland  We then irrigated the field and closed using 3-0 Vicryl to close the strap muscles the midline followed by 3-0 Vicryl to close the platysma, followed by 4-0 Vicryl close the dermis, followed by 5-0 Monocryl to close skin in subcuticular fashion  Benzoin and Steri-Strips were then applied in the usual fashion  The patient was awakened transferred to the recovery room in stable condition     I was present for the entire procedure    Patient Disposition:  PACU         SIGNATURE: Selina Shah MD  DATE: January 3, 2023  TIME: 4:13 PM

## 2023-01-03 NOTE — ANESTHESIA PREPROCEDURE EVALUATION
Procedure:  MINIMALLY INVASIVE RIGHT PARATHYROIDECTOMY, POSSIBLE 4 GLAND EXPLORATION (Right: Neck)  MONITORING INTRAOPERATIVE PTH (PARATHYROID HORMONE) (Neck)    Relevant Problems   CARDIO   (+) High cholesterol      ENDO   (+) Hyperparathyroidism (HCC)        Physical Exam    Airway    Mallampati score: II  TM Distance: >3 FB  Neck ROM: full     Dental   No notable dental hx     Cardiovascular  Cardiovascular exam normal    Pulmonary  Pulmonary exam normal     Other Findings        Anesthesia Plan  ASA Score- 2     Anesthesia Type- general with ASA Monitors  Additional Monitors: arterial line  Airway Plan: ETT  Plan Factors-Exercise tolerance (METS): >4 METS  Chart reviewed  EKG reviewed  Existing labs reviewed  Patient summary reviewed  Patient is a current smoker  Patient instructed to abstain from smoking on day of procedure  Patient did not smoke on day of surgery  Induction- intravenous  Postoperative Plan- Plan for postoperative opioid use  Planned trial extubation    Informed Consent- Anesthetic plan and risks discussed with patient  I personally reviewed this patient with the CRNA  Discussed and agreed on the Anesthesia Plan with the CRNA  Geo Flanagan

## 2023-01-03 NOTE — INTERVAL H&P NOTE
H&P reviewed  After examining the patient I find no changes in the patients condition since the H&P had been written      Vitals:    01/03/23 1314   BP: 124/84   Pulse: 72   Temp: 98 °F (36 7 °C)   SpO2: 97%

## 2023-01-10 ENCOUNTER — OFFICE VISIT (OUTPATIENT)
Dept: ANESTHESIOLOGY | Facility: CLINIC | Age: 68
End: 2023-01-10

## 2023-01-10 DIAGNOSIS — Z09 ENCOUNTER FOR FOLLOW-UP: ICD-10-CM

## 2023-01-10 DIAGNOSIS — F17.200 SMOKING: Primary | ICD-10-CM

## 2023-01-10 NOTE — PROGRESS NOTES
Naman Centeno is 74year old female who was seen Tong on 12/27/2022 before her surgery on 01/03/2023  Was seen to start a plan to quit smoking before and continue after surgery  When we spoke to her on 12/27/2022 she was smoking 15 cigarettes a day, refused nicotine dependence therapy such as patches, or nicotine gum  She wanted to try the method of weaning herself down  As of today 01/10/23 patient is smoking half a pack a day  Still does not want any patches or gum  Would like to use the weaning process still  Tong NP aware   Will follow up in 1 month, on 02/14/2023 @ 1000

## 2023-01-12 PROBLEM — Z90.89 STATUS POST PARATHYROIDECTOMY: Status: ACTIVE | Noted: 2023-01-12

## 2023-01-12 PROBLEM — Z98.890 STATUS POST PARATHYROIDECTOMY: Status: ACTIVE | Noted: 2023-01-12

## 2023-01-12 PROBLEM — E89.2 STATUS POST PARATHYROIDECTOMY (HCC): Status: ACTIVE | Noted: 2023-01-12

## 2023-01-16 ENCOUNTER — OFFICE VISIT (OUTPATIENT)
Dept: SURGICAL ONCOLOGY | Facility: CLINIC | Age: 68
End: 2023-01-16

## 2023-01-16 VITALS
DIASTOLIC BLOOD PRESSURE: 72 MMHG | BODY MASS INDEX: 28.35 KG/M2 | HEIGHT: 63 IN | WEIGHT: 160 LBS | SYSTOLIC BLOOD PRESSURE: 134 MMHG | OXYGEN SATURATION: 98 % | HEART RATE: 60 BPM

## 2023-01-16 DIAGNOSIS — E89.2 STATUS POST PARATHYROIDECTOMY (HCC): Primary | ICD-10-CM

## 2023-01-16 NOTE — LETTER
January 16, 2023     Vincenzo Wilson MD  [address]  [address] [facility]  [address]    Patient: Lewis Muñoz   YOB: 1955   Date of Visit: 1/16/2023       Dear Dr Chayo Carlson: Thank you for referring Sonia Fields to me for evaluation  Below are my notes for this consultation  If you have questions, please do not hesitate to call me  I look forward to following your patient along with you  Sincerely,        Linda Dorsey MD        CC: MD Linda Grant MD  1/16/2023 11:39 AM  Incomplete     Surgical Oncology Follow Up       [address] [facility] [address] [address]  [phone]    Lewis Muñoz  1955  [id number]  [address] [facility] Shannon shannen  [address] [facility] [address] [address]  [phone]    Chief Complaint   Patient presents with   • Post-op     Pt presents for a post op due to having MINIMALLY INVASIVE RIGHT PARATHYROIDECTOMY on 1/3/23  Pt reports doing well post surg  Assessment/Plan:    No problem-specific Assessment & Plan notes found for this encounter  Diagnoses and all orders for this visit:    Status post parathyroidectomy [facility])       Advance Care Planning/Advance Directives:  Discussed disease status, cancer treatment plans and/or cancer treatment goals with the patient  Oncology History    No history exists  History of Present Illness: 59-year-old woman here for postop check status post parathyroidectomy  680 mg right inferior parathyroid gland removed  -Interval History: No issues or complaints since procedure was performed  Review of Systems:  Review of Systems   Constitutional: Negative  HENT: Negative  Eyes: Negative  Respiratory: Negative  Cardiovascular: Negative  Gastrointestinal: Negative  Endocrine: Negative  Genitourinary: Negative      Musculoskeletal: Negative  Skin: Negative  Allergic/Immunologic: Negative  Neurological: Negative  Hematological: Negative  Psychiatric/Behavioral: Negative          Patient Active Problem List   Diagnosis   • High cholesterol   • Current every day smoker   • Environmental allergies   • Hyperparathyroidism ([address] )   • Hypercalcemia   • Post-menopausal   • Osteopenia   • Other osteoporosis without current pathological fracture   • Influenza vaccination declined   • Constipation   • Status post parathyroidectomy [facility])     Past Medical History:   Diagnosis Date   • Depression    • Hyperlipidemia     last assessed -      Past Surgical History:   Procedure Laterality Date   •  SECTION     • IN PARATHYROIDECTOMY/EXPLORATION PARATHYROIDS Right 1/3/2023    Procedure: MINIMALLY INVASIVE RIGHT PARATHYROIDECTOMY;  Surgeon: Michelle Maria MD;  [facility];  Service: Surgical Oncology   • US GUIDED THYROID BIOPSY  2022     Family History   Problem Relation Age of Onset   • Cancer Mother    • Cancer Father    • Cancer Sister    • Breast cancer Sister    • No Known Problems Maternal Grandmother    • No Known Problems Paternal Grandmother    • No Known Problems Maternal Aunt    • No Known Problems Maternal Aunt    • No Known Problems Maternal Aunt    • No Known Problems Paternal Aunt    • No Known Problems Paternal Aunt      Social History     Socioeconomic History   • Marital status: /Civil Union     Spouse name: Not on file   • Number of children: Not on file   • Years of education: Not on file   • Highest education level: Not on file   Occupational History   • Not on file   Tobacco Use   • Smoking status: Every Day     Packs/day: 0 50     Years: 30 00     Pack years: 15 00     Types: Cigarettes   • Smokeless tobacco: Never   Vaping Use   • Vaping Use: Never used   Substance and Sexual Activity   • Alcohol use: No   • Drug use: No   • Sexual activity: Not on file   Other Topics Concern   • Not on file   Social History Narrative    Stress at home     Social Determinants of Health     Financial Resource Strain: Low Risk    • Difficulty of Paying Living Expenses: Not hard at all   Food Insecurity: Not on file   Transportation Needs: No Transportation Needs   • Lack of Transportation (Medical): No   • Lack of Transportation (Non-Medical): No   Physical Activity: Not on file   Stress: Not on file   Social Connections: Not on file   Intimate Partner Violence: Not on file   Housing Stability: Not on file       Current Outpatient Medications:   •  alendronate (FOSAMAX) 70 mg tablet, TAKE 1 TABLET BY MOUTH EVERY 7 DAYS  (Patient taking differently: Take 70 mg by mouth every 7 days Wednesday), Disp: 12 tablet, Rfl: 2  •  atorvastatin (LIPITOR) 20 mg tablet, TAKE ONE TABLET BY MOUTH EVERY DAY (Patient taking differently: Take 20 mg by mouth daily with dinner), Disp: 90 tablet, Rfl: 3  •  Cholecalciferol 50 MCG (2000 UT) CAPS, Take by mouth daily, Disp: , Rfl:   •  fluticasone (FLONASE) 50 mcg/act nasal spray, 2 sprays into each nostril if needed, Disp: , Rfl:   •  timolol (TIMOPTIC) 0 5 % ophthalmic solution, Administer 1 drop to both eyes 2 (two) times a day, Disp: , Rfl:   •  traMADol (Ultram) 50 mg tablet, Take 1 tablet (50 mg total) by mouth every 6 (six) hours as needed for moderate pain, Disp: 10 tablet, Rfl: 0  Allergies   Allergen Reactions   • Sulfa Antibiotics Hives     Vitals:    01/16/23 1122   BP: 134/72   Pulse: 60   SpO2: 98%       Physical Exam  Neck:      Comments: Incision clean dry intact  Musculoskeletal:      Cervical back: Normal range of motion and neck supple  Neurological:      Deep Tendon Reflexes: Abnormal reflex:  lastpth             Results:  Labs:  Lab Results   Component Value Date    PTH 51 9 01/03/2023    CALCIUM 9 8 01/03/2023    PHOS 2 9 10/12/2018     Case Report   Surgical Pathology Report                         Case: [id number]                                    Authorizing Provider: Amanda Wheeler MD        Collected:           01/03/2023 1447               Ordering Location:     [address]      Received:            01/03/2023 1458                                      Hospital Operating Room                                                       Pathologist:           Karole Simmonds, MD                                                                  Intraop:               Karole Simmonds, MD                                                                  Specimens:   A) - Parathyroid, Right Superior Parathyroid?                                                        B) - Parathyroid, Right Inferior                                                           Final Diagnosis   A  Parathyroid, Right Superior:  - Benign thyroid tissue      B  Parathyroid, Right Inferior:  - Hypercellular parathyroid tissue, weight 680 mg  Electronically signed by Karole Simmonds, MD on 1/4/2023 at  3:32 PM   Comments: This is an appended report  These results have been appended to a previously preliminary verified report  Imaging  No results found  I reviewed the above laboratory and imaging data  Discussion/Summary: Status post right-sided parathyroidectomy  Doing well  Follow-up in 6 months to assess durability of operation we will repeat check of calcium, vitamin D, and PTH levels

## 2023-02-14 ENCOUNTER — TELEMEDICINE (OUTPATIENT)
Dept: ANESTHESIOLOGY | Facility: CLINIC | Age: 68
End: 2023-02-14

## 2023-02-14 DIAGNOSIS — F17.200 CURRENT EVERY DAY SMOKER: Primary | ICD-10-CM

## 2023-02-14 NOTE — PROGRESS NOTES
[facility]   Progress note: Tobacco therapy   Telemedicine Brief Visit    Patient is located in the following state in which I hold an active license [place]      Assessment/Plan:  • 72-year-old female referred to [facility] for presurgery tobacco therapy  • Patient is an active smoker and would like to be smoke-free for surgery and recovery  • Today is her 3rd  follow-up visit with tobacco therapy  • She is status post  On 1/3/2023    Case: [id number] Date/Time: 01/03/23 1440   Procedures:        MINIMALLY INVASIVE RIGHT PARATHYROIDECTOMY, POSSIBLE 4 GLAND EXPLORATION (Right: Neck)       MONITORING INTRAOPERATIVE PTH (PARATHYROID HORMONE) (Neck)   Anesthesia type: General   Diagnosis: Hyperparathyroidism ([address] ) [E21 3]   Pre-op diagnosis: Hyperparathyroidism ([facility] [address] [facility] ) [E21 3]   Location: [facility] OR ROOM 05 / [facility] MA[facility] OR   Surgeons: Cinthia Valadez MD        [facility]      Current every day smoker - Primary  • Patient is currently smoking 10 cigarettes a day  • Would like to attempt to wean on her own, method cold turkey  • Decline nicotine replacement therapy at this time  • SOC to sign off  Patient was encouraged to call us if she changes her mind          Problem List Items Addressed This Visit        Other    Current every day smoker - Primary - see above        Recent Visits  No visits were found meeting these conditions  Showing recent visits within past 7 days and meeting all other requirements  Today's Visits  Date Type Provider Dept   02/14/23 201 [facility], [address] [place] [address] [facility]   Showing today's visits and meeting all other requirements  Future Appointments  No visits were found meeting these conditions  Showing future appointments within next 150 days and meeting all other requirements         Visit Time    Visit Start Time:1034  Visit Stop Time: 3144  Total Visit Duration: 5 minutes    Progress note  I spoke to Deion on the telephone today    She did not wish to connect via video   She sounded a little upset  She explained she has been "a little emotional lately"  Describes it is the 3 year anniversary of her 's death  States "sometimes in life, things get stressful"  Admits to having a hiccup in her tobacco cessation journey  She was down to smoking about 10 cigarettes a day  She went back up to 15 cigs/daily with having stress in her life  At this time she does still wishes to try to attempt to quit "cold turkey"  She is not interested in adding nicotine replacement therapies  I did provide education to her on the replacement therapy that is available to her  Last time we spoke about using lollipops to help control urges  She states the lollipops are "too sugary for her"  She has been chewing gum and will stick with chewing gum  She is also attempting to take a "few puffs" of the cigarette & then put it out  Also re-lighting the same "used" cigarette  Patient states she knows what she needs to do, but she is experiencing some stress in her life  I offered the patient some therapy and counseling  Patient states she is already went to therapy and "have been on depression medications"  I did asked the patient was asked to call her again for verbal counseling, patient declined at this time  At this time [facility] to sign off  Patient was instructed to call us back if she changes her mind  Yamilavelino Chaparro is 74year old female who was seen [facility] on 12/27/2022 before her surgery on 01/03/2023  Was seen to start a plan to quit smoking before and continue after surgery  When we spoke to her on 12/27/2022 she was smoking 15 cigarettes a day, refused nicotine dependence therapy such as patches, or nicotine gum  She wanted to try the method of weaning herself down  As of 01/10/23 patient is smoking half a pack a day  Still does not want any patches or gum  Would like to use the weaning process still        Follow up visit today on 02/14/2023 patient is smoking about half a pack a day  Still does not want any patches or gum, would like to still do the wean process   Patient stated, "she felt like she went backwards due to the anniversary death of her , but other than that still working forward"

## 2023-02-20 ENCOUNTER — APPOINTMENT (OUTPATIENT)
Dept: LAB | Facility: CLINIC | Age: 68
End: 2023-02-20

## 2023-02-20 DIAGNOSIS — E21.3 HYPERPARATHYROIDISM (HCC): ICD-10-CM

## 2023-02-20 DIAGNOSIS — E78.00 HIGH CHOLESTEROL: ICD-10-CM

## 2023-02-20 DIAGNOSIS — Z12.11 COLON CANCER SCREENING: ICD-10-CM

## 2023-02-20 LAB
ALBUMIN SERPL BCP-MCNC: 3.8 G/DL (ref 3.5–5)
ALP SERPL-CCNC: 65 U/L (ref 46–116)
ALT SERPL W P-5'-P-CCNC: 28 U/L (ref 12–78)
ANION GAP SERPL CALCULATED.3IONS-SCNC: 6 MMOL/L (ref 4–13)
AST SERPL W P-5'-P-CCNC: 17 U/L (ref 5–45)
BILIRUB SERPL-MCNC: 0.45 MG/DL (ref 0.2–1)
BUN SERPL-MCNC: 16 MG/DL (ref 5–25)
CALCIUM SERPL-MCNC: 9.7 MG/DL (ref 8.3–10.1)
CHLORIDE SERPL-SCNC: 111 MMOL/L (ref 96–108)
CHOLEST SERPL-MCNC: 166 MG/DL
CO2 SERPL-SCNC: 25 MMOL/L (ref 21–32)
CREAT SERPL-MCNC: 0.77 MG/DL (ref 0.6–1.3)
GFR SERPL CREATININE-BSD FRML MDRD: 81 ML/MIN/1.73SQ M
GLUCOSE P FAST SERPL-MCNC: 74 MG/DL (ref 65–99)
HDLC SERPL-MCNC: 58 MG/DL
LDLC SERPL CALC-MCNC: 83 MG/DL (ref 0–100)
POTASSIUM SERPL-SCNC: 4.3 MMOL/L (ref 3.5–5.3)
PROT SERPL-MCNC: 7.3 G/DL (ref 6.4–8.4)
PTH-INTACT SERPL-MCNC: 40 PG/ML (ref 18.4–80.1)
SODIUM SERPL-SCNC: 142 MMOL/L (ref 135–147)
TRIGL SERPL-MCNC: 126 MG/DL

## 2023-02-23 ENCOUNTER — OFFICE VISIT (OUTPATIENT)
Dept: ENDOCRINOLOGY | Facility: CLINIC | Age: 68
End: 2023-02-23

## 2023-02-23 VITALS
SYSTOLIC BLOOD PRESSURE: 110 MMHG | WEIGHT: 160 LBS | HEART RATE: 80 BPM | DIASTOLIC BLOOD PRESSURE: 62 MMHG | HEIGHT: 63 IN | BODY MASS INDEX: 28.35 KG/M2 | OXYGEN SATURATION: 94 %

## 2023-02-23 DIAGNOSIS — M81.8 OTHER OSTEOPOROSIS WITHOUT CURRENT PATHOLOGICAL FRACTURE: ICD-10-CM

## 2023-02-23 DIAGNOSIS — E89.2 STATUS POST PARATHYROIDECTOMY (HCC): ICD-10-CM

## 2023-02-23 DIAGNOSIS — E04.1 THYROID NODULE: Primary | ICD-10-CM

## 2023-02-23 PROBLEM — E21.3 HYPERPARATHYROIDISM (HCC): Status: RESOLVED | Noted: 2018-07-25 | Resolved: 2023-02-23

## 2023-02-23 NOTE — ASSESSMENT & PLAN NOTE
Discussed calcium supplementation 1,200 mg of calcium daily  Continue Vit D  Continue Fosamax  Continue to receive adequate ca+ in diet  Due for next DEXA scan in November  Order placed  Continue with weight bearing activity  Reviewed fall precautions

## 2023-02-23 NOTE — PATIENT INSTRUCTIONS
Complete ultrasound of the thyroid anytime after June 1st   Complete DEXA scan in November  Recommend 2,000 units of Vit D and 1,200 mg of calcium daily  Follow up with me in one year

## 2023-02-23 NOTE — PROGRESS NOTES
Established Patient Progress Note       Chief Complaint   Patient presents with   • Hyperparathyroidism     Patient had parathyroid removed on 23        History of Present Illness:     Saurabh Brock is a 74 y o  female with primary hyperparathyroidism status post parathyroidectomy, thyroid nodules, and age-related osteoporosis presenting to the office today for routine follow-up  For primary hyperparathyroidism, patient underwent a minimally invasive right parathyroidectomy on 1/3/2023  Patient reports feeling well  She completed labs on 2023 which demonstrated a normal PTH and stable serum calcium  For thyroid nodules, patient completed a fine-needle aspiration biopsy of left lower pole thyroid nodule measuring 2 9 x 2 5 x 2 2 cm on 2022  Results were benign-[facility] category 2  Continued surveillance was recommended  She denies any compressive symptoms  For osteoporosis, she is being treated with alendronate 70 mg once weekly  Denies s/e including GI upset and arthralgias  Denies any recent falls or fractures  She is supplementing with a woman's one-a-day vit and 2,000 iu of Vit D daily  Next DEXA scan will be due on 11/15/2023  She is making an effort to reduce/quit smoking      Patient Active Problem List   Diagnosis   • High cholesterol   • Current every day smoker   • Environmental allergies   • Hypercalcemia   • Post-menopausal   • Other osteoporosis without current pathological fracture   • Influenza vaccination declined   • Constipation   • Status post parathyroidectomy [facility])      Past Medical History:   Diagnosis Date   • Depression    • Hyperlipidemia     last assessed - 2016   • Hyperparathyroidism ([facility] [address] ) 2018      Past Surgical History:   Procedure Laterality Date   •  SECTION     • HI PARATHYROIDECTOMY/EXPLORATION PARATHYROIDS Right 1/3/2023    Procedure: MINIMALLY INVASIVE RIGHT PARATHYROIDECTOMY;  Surgeon: James Mckeon MD;  Location: [facility]; Service: Surgical Oncology   • US GUIDED THYROID BIOPSY  6/9/2022      Family History   Problem Relation Age of Onset   • Cancer Mother    • Cancer Father    • Cancer Sister    • Breast cancer Sister    • No Known Problems Maternal Grandmother    • No Known Problems Paternal Grandmother    • No Known Problems Maternal Aunt    • No Known Problems Maternal Aunt    • No Known Problems Maternal Aunt    • No Known Problems Paternal Aunt    • No Known Problems Paternal Aunt      Social History     Tobacco Use   • Smoking status: Every Day     Packs/day: 0 50     Years: 30 00     Pack years: 15 00     Types: Cigarettes   • Smokeless tobacco: Never   Substance Use Topics   • Alcohol use: No     Allergies   Allergen Reactions   • Sulfa Antibiotics Hives       Current Outpatient Medications:   •  alendronate (FOSAMAX) 70 mg tablet, TAKE 1 TABLET BY MOUTH EVERY 7 DAYS  (Patient taking differently: Take 70 mg by mouth every 7 days Wednesday), Disp: 12 tablet, Rfl: 2  •  atorvastatin (LIPITOR) 20 mg tablet, TAKE ONE TABLET BY MOUTH EVERY DAY (Patient taking differently: Take 20 mg by mouth daily with dinner), Disp: 90 tablet, Rfl: 3  •  Cholecalciferol 50 MCG (2000 UT) CAPS, Take by mouth daily, Disp: , Rfl:   •  fluticasone (FLONASE) 50 mcg/act nasal spray, 2 sprays into each nostril if needed, Disp: , Rfl:   •  timolol (TIMOPTIC) 0 5 % ophthalmic solution, Administer 1 drop to both eyes 2 (two) times a day, Disp: , Rfl:   •  traMADol (Ultram) 50 mg tablet, Take 1 tablet (50 mg total) by mouth every 6 (six) hours as needed for moderate pain, Disp: 10 tablet, Rfl: 0    Review of Systems   Constitutional: Positive for fatigue  Negative for activity change, appetite change and unexpected weight change  HENT: Negative for dental problem, sore throat, trouble swallowing and voice change  Eyes: Negative for visual disturbance  Respiratory: Negative for cough, chest tightness and shortness of breath      Cardiovascular: Negative for chest pain, palpitations and leg swelling  Gastrointestinal: Negative for constipation, diarrhea, nausea and vomiting  Endocrine: Negative for cold intolerance, heat intolerance, polydipsia, polyphagia and polyuria  Genitourinary: Negative for frequency  Musculoskeletal: Negative for arthralgias, back pain, gait problem and myalgias  Skin: Negative for wound  Allergic/Immunologic: Positive for environmental allergies  Negative for food allergies  Neurological: Negative for dizziness, weakness, light-headedness, numbness and headaches  Psychiatric/Behavioral: Positive for dysphoric mood  Negative for decreased concentration and sleep disturbance  The patient is nervous/anxious  Physical Exam:  Body mass index is 28 34 kg/m²  /62   Pulse 80   Ht 5' 3" (1 6 m)   Wt 72 6 kg (160 lb)   SpO2 94%   BMI 28 34 kg/m²    Wt Readings from Last 3 Encounters:   02/23/23 72 6 kg (160 lb)   01/16/23 72 6 kg (160 lb)   01/03/23 72 1 kg (159 lb)       Physical Exam  Vitals reviewed  Constitutional:       General: She is not in acute distress  Appearance: She is well-developed  She is not ill-appearing  HENT:      Head: Normocephalic and atraumatic  Eyes:      Pupils: Pupils are equal, round, and reactive to light  Neck:      Thyroid: No thyromegaly  Cardiovascular:      Rate and Rhythm: Normal rate and regular rhythm  Pulses: Normal pulses  Heart sounds: Normal heart sounds  Pulmonary:      Effort: Pulmonary effort is normal       Breath sounds: Wheezing (expiratory) present  No rhonchi  Abdominal:      General: Bowel sounds are normal  There is no distension  Palpations: Abdomen is soft  Tenderness: There is no abdominal tenderness  Musculoskeletal:      Cervical back: Normal range of motion and neck supple  Right lower leg: No edema  Left lower leg: No edema  Lymphadenopathy:      Cervical: No cervical adenopathy     Skin:     General: Skin is warm and dry  Capillary Refill: Capillary refill takes less than 2 seconds  Neurological:      Mental Status: She is alert and oriented to person, place, and time  Gait: Gait normal    Psychiatric:         Mood and Affect: Mood normal          Behavior: Behavior normal          Labs:       Lab Results   Component Value Date    CREATININE 0 77 02/20/2023    CREATININE 0 84 12/02/2022    CREATININE 0 78 04/01/2022    BUN 16 02/20/2023     11/28/2014    K 4 3 02/20/2023     (H) 02/20/2023    CO2 25 02/20/2023     eGFR   Date Value Ref Range Status   02/20/2023 81 ml/min/1 73sq m Final       Lab Results   Component Value Date    CHOL 162 11/28/2014    HDL 58 02/20/2023    TRIG 126 02/20/2023       Lab Results   Component Value Date    ALT 28 02/20/2023    AST 17 02/20/2023    ALKPHOS 65 02/20/2023    BILITOT 0 80 11/28/2014       Lab Results   Component Value Date    FREET4 1 02 10/12/2018         Impression & Plan:    Problem List Items Addressed This Visit        Musculoskeletal and Integument    Other osteoporosis without current pathological fracture     Discussed calcium supplementation 1,200 mg of calcium daily  Continue Vit D  Continue Fosamax  Continue to receive adequate ca+ in diet  Due for next DEXA scan in November  Order placed  Continue with weight bearing activity  Reviewed fall precautions  Relevant Orders    DXA bone density spine hip and pelvis       Other    Status post parathyroidectomy (HCC)     Serum calcium and PTH are stable  Will continue to monitor  Other Visit Diagnoses     Thyroid nodule    -  Primary    Relevant Orders    US thyroid          Orders Placed This Encounter   Procedures   • US thyroid     Standing Status:   Future     Standing Expiration Date:   2/23/2027     Scheduling Instructions:      No prep required  Please bring your insurance cards, a form of photo ID and a list of your medications with you   Arrive 15 minutes prior to your appointment time in order to register  To schedule this appointment, please contact Central Scheduling at [phone]  • DXA bone density spine hip and pelvis     Standing Status:   Future     Standing Expiration Date:   2/23/2027     Scheduling Instructions:      Please wear comfortable clothing with no metal buttons, zippers, snaps or an underwire bra  Do not take any calcium supplements 24 hours prior to your test  Please bring your insurance cards, a form of photo ID and a list of your medications with you  Arrive 5-10 minutes prior to your appointment time in order to register  Your study cannot be performed if you take your calcium supplement 24 hours before the scheduled Dexa scan examination  To schedule this appointment, please contact Central Scheduling at [phone]  Patient Instructions   1  Complete ultrasound of the thyroid anytime after June 1st   2  Complete DEXA scan in November  3  Recommend 2,000 units of Vit D and 1,200 mg of calcium daily  4  Follow up with me in one year  Discussed with the patient and all questioned fully answered  She will call me if any problems arise  Follow-up appointment in 12 months       Counseled patient on diagnostic results, prognosis, risk and benefit of treatment options, instruction for management, importance of treatment compliance, Risk  factor reduction and impressions      JAVON Malloy

## 2023-03-06 ENCOUNTER — APPOINTMENT (OUTPATIENT)
Dept: LAB | Facility: CLINIC | Age: 68
End: 2023-03-06

## 2023-03-06 LAB — HEMOCCULT STL QL IA: NEGATIVE

## 2023-03-08 ENCOUNTER — RA CDI HCC (OUTPATIENT)
Dept: OTHER | Facility: HOSPITAL | Age: 68
End: 2023-03-08

## 2023-03-08 NOTE — PROGRESS NOTES
[address]  coding opportunities       Chart reviewed, no opportunity found: CHART REVIEWED, NO OPPORTUNITY FOUND        Patients Insurance     Medicare Insurance: Medicare

## 2023-03-15 ENCOUNTER — OFFICE VISIT (OUTPATIENT)
Dept: FAMILY MEDICINE CLINIC | Facility: CLINIC | Age: 68
End: 2023-03-15

## 2023-03-15 VITALS
SYSTOLIC BLOOD PRESSURE: 130 MMHG | OXYGEN SATURATION: 93 % | TEMPERATURE: 97.9 F | HEART RATE: 72 BPM | DIASTOLIC BLOOD PRESSURE: 84 MMHG | WEIGHT: 164.4 LBS | BODY MASS INDEX: 29.13 KG/M2 | HEIGHT: 63 IN

## 2023-03-15 DIAGNOSIS — F17.200 CURRENT EVERY DAY SMOKER: ICD-10-CM

## 2023-03-15 DIAGNOSIS — E78.00 HIGH CHOLESTEROL: Primary | ICD-10-CM

## 2023-03-15 DIAGNOSIS — Z53.20 LUNG CANCER SCREENING DECLINED BY PATIENT: ICD-10-CM

## 2023-03-15 DIAGNOSIS — E89.2 STATUS POST PARATHYROIDECTOMY (HCC): ICD-10-CM

## 2023-03-15 PROBLEM — E83.52 HYPERCALCEMIA: Status: RESOLVED | Noted: 2018-07-25 | Resolved: 2023-03-15

## 2023-03-15 NOTE — PROGRESS NOTES
Assessment/Plan:         Problem List Items Addressed This Visit        Other    High cholesterol - Primary     Controlled on statin          Current every day smoker     Counseling to quit - pt is cutting back         Status post parathyroidectomy ([facility] [address] )     Labs are stable, patient doing well         Lung cancer screening declined by patient     Pt declined screening              Subjective:      Patient ID: Tyler Mason is a 74 y o  female  She is here for lab review  Recently had parathyroidectomy  Calcium levels are now normal  She has seen her surgeon and endocrinology  Cholesterol is normal on statin therapy  No acute complaints      The following portions of the patient's history were reviewed and updated as appropriate:   Past Medical History:  She has a past medical history of Depression, Hyperlipidemia, and Hyperparathyroidism ([facility] [address] ) (2018)  ,  _______________________________________________________________________  Medical Problems:  does not have any pertinent problems on file ,  _______________________________________________________________________  Past Surgical History:   has a past surgical history that includes  section; US guided thyroid biopsy (2022); and pr parathyroidectomy/exploration parathyroids (Right, 1/3/2023)  ,  _______________________________________________________________________  Family History:  family history includes Breast cancer in her sister; Cancer in her father, mother, and sister; No Known Problems in her maternal aunt, maternal aunt, maternal aunt, maternal grandmother, paternal aunt, paternal aunt, and paternal grandmother ,  _______________________________________________________________________  Social History:   reports that she has been smoking cigarettes  She has a 15 00 pack-year smoking history   She has never used smokeless tobacco  She reports that she does not drink alcohol and does not use drugs ,  _______________________________________________________________________  Allergies:  is allergic to sulfa antibiotics     _______________________________________________________________________  Current Outpatient Medications   Medication Sig Dispense Refill   • alendronate (FOSAMAX) 70 mg tablet TAKE 1 TABLET BY MOUTH EVERY 7 DAYS  (Patient taking differently: Take 70 mg by mouth every 7 days Wednesday) 12 tablet 2   • atorvastatin (LIPITOR) 20 mg tablet TAKE ONE TABLET BY MOUTH EVERY DAY (Patient taking differently: Take 20 mg by mouth daily with dinner) 90 tablet 3   • Cholecalciferol 50 MCG (2000 UT) CAPS Take by mouth daily     • fluticasone (FLONASE) 50 mcg/act nasal spray 2 sprays into each nostril if needed     • timolol (TIMOPTIC) 0 5 % ophthalmic solution Administer 1 drop to both eyes 2 (two) times a day       No current facility-administered medications for this visit      _______________________________________________________________________  Review of Systems   Constitutional: Negative for activity change  Respiratory: Negative for chest tightness and shortness of breath  Cardiovascular: Negative for chest pain and leg swelling  Neurological: Negative for headaches  Psychiatric/Behavioral: Negative for dysphoric mood  The patient is not nervous/anxious  Objective:  Vitals:    03/15/23 1157   BP: 130/84   BP Location: Left arm   Patient Position: Sitting   Pulse: 72   Temp: 97 9 °F (36 6 °C)   TempSrc: Temporal   SpO2: 93%   Weight: 74 6 kg (164 lb 6 4 oz)   Height: 5' 3" (1 6 m)     Body mass index is 29 12 kg/m²  Physical Exam  Vitals and nursing note reviewed  Constitutional:       General: She is not in acute distress  Appearance: Normal appearance  She is not ill-appearing, toxic-appearing or diaphoretic  HENT:      Head: Normocephalic and atraumatic        Right Ear: External ear normal       Left Ear: External ear normal    Neck:     Cardiovascular:      Rate and Rhythm: Normal rate and regular rhythm  Heart sounds: No murmur heard  No friction rub  Pulmonary:      Effort: Pulmonary effort is normal  No respiratory distress  Breath sounds: Normal breath sounds  No stridor  No wheezing, rhonchi or rales  Musculoskeletal:         General: No swelling  Right lower leg: No edema  Left lower leg: No edema  Neurological:      General: No focal deficit present  Mental Status: She is alert  Mental status is at baseline  Psychiatric:         Attention and Perception: Attention normal          Mood and Affect: Mood normal          Speech: Speech normal          Behavior: Behavior normal          Thought Content:  Thought content normal          Judgment: Judgment normal

## 2023-03-17 NOTE — PROGRESS NOTES
Surgical Oncology Follow Up       [address] [facility]  [address] [address], located within [facility] [id number] [phone]    Tino Alfaro  1955  [id number]  [address] [facility] [place]  [address] [address], located within [facility] [id number] [phone]    Chief Complaint   Patient presents with   • Post-op     Pt presents for a post op due to having MINIMALLY INVASIVE RIGHT PARATHYROIDECTOMY on 1/3/23  Pt reports doing well post surg  Assessment/Plan:    No problem-specific Assessment & Plan notes found for this encounter  Diagnoses and all orders for this visit:    Status post parathyroidectomy [facility])        Advance Care Planning/Advance Directives:  Discussed disease status, cancer treatment plans and/or cancer treatment goals with the patient  Oncology History    No history exists  History of Present Illness: 72-year-old woman here for postop check status post parathyroidectomy  680 mg right inferior parathyroid gland removed  -Interval History: No issues or complaints since procedure was performed  Review of Systems:  Review of Systems   Constitutional: Negative  HENT: Negative  Eyes: Negative  Respiratory: Negative  Cardiovascular: Negative  Gastrointestinal: Negative  Endocrine: Negative  Genitourinary: Negative  Musculoskeletal: Negative  Skin: Negative  Allergic/Immunologic: Negative  Neurological: Negative  Hematological: Negative  Psychiatric/Behavioral: Negative          Patient Active Problem List   Diagnosis   • High cholesterol   • Current every day smoker   • Environmental allergies   • Hyperparathyroidism ([facility] [address] )   • Hypercalcemia   • Post-menopausal   • Osteopenia   • Other osteoporosis without current pathological fracture   • Influenza vaccination declined   • Constipation   • Status post parathyroidectomy [facility])     Past Medical History: Diagnosis Date   • Depression    • Hyperlipidemia     last assessed - 2016     Past Surgical History:   Procedure Laterality Date   •  SECTION     • [place] PARATHYROIDECTOMY/EXPLORATION PARATHYROIDS Right 1/3/2023    Procedure: MINIMALLY INVASIVE RIGHT PARATHYROIDECTOMY;  Surgeon: Kalani Thomas MD;  [facility];  Service: Surgical Oncology   • US GUIDED THYROID BIOPSY  2022     Family History   Problem Relation Age of Onset   • Cancer Mother    • Cancer Father    • Cancer Sister    • Breast cancer Sister    • No Known Problems Maternal Grandmother    • No Known Problems Paternal Grandmother    • No Known Problems Maternal Aunt    • No Known Problems Maternal Aunt    • No Known Problems Maternal Aunt    • No Known Problems Paternal Aunt    • No Known Problems Paternal Aunt      Social History     Socioeconomic History   • Marital status: /Civil Union     Spouse name: Not on file   • Number of children: Not on file   • Years of education: Not on file   • Highest education level: Not on file   Occupational History   • Not on file   Tobacco Use   • Smoking status: Every Day     Packs/day: 0 50     Years: 30 00     Pack years: 15 00     Types: Cigarettes   • Smokeless tobacco: Never   Vaping Use   • Vaping Use: Never used   Substance and Sexual Activity   • Alcohol use: No   • Drug use: No   • Sexual activity: Not on file   Other Topics Concern   • Not on file   Social History Narrative    Stress at home     Social Determinants of Health     Financial Resource Strain: Low Risk    • Difficulty of Paying Living Expenses: Not hard at all   Food Insecurity: Not on file   Transportation Needs: No Transportation Needs   • Lack of Transportation (Medical): No   • Lack of Transportation (Non-Medical):  No   Physical Activity: Not on file   Stress: Not on file   Social Connections: Not on file   Intimate Partner Violence: Not on file   Housing Stability: Not on file       Current Outpatient Medications:   •  alendronate (FOSAMAX) 70 mg tablet, TAKE 1 TABLET BY MOUTH EVERY 7 DAYS  (Patient taking differently: Take 70 mg by mouth every 7 days Wednesday), Disp: 12 tablet, Rfl: 2  •  atorvastatin (LIPITOR) 20 mg tablet, TAKE ONE TABLET BY MOUTH EVERY DAY (Patient taking differently: Take 20 mg by mouth daily with dinner), Disp: 90 tablet, Rfl: 3  •  Cholecalciferol 50 MCG (2000 UT) CAPS, Take by mouth daily, Disp: , Rfl:   •  fluticasone (FLONASE) 50 mcg/act nasal spray, 2 sprays into each nostril if needed, Disp: , Rfl:   •  timolol (TIMOPTIC) 0 5 % ophthalmic solution, Administer 1 drop to both eyes 2 (two) times a day, Disp: , Rfl:   •  traMADol (Ultram) 50 mg tablet, Take 1 tablet (50 mg total) by mouth every 6 (six) hours as needed for moderate pain, Disp: 10 tablet, Rfl: 0  Allergies   Allergen Reactions   • Sulfa Antibiotics Hives     Vitals:    01/16/23 1122   BP: 134/72   Pulse: 60   SpO2: 98%       Physical Exam  Neck:      Comments: Incision clean dry intact  Musculoskeletal:      Cervical back: Normal range of motion and neck supple  Neurological:      Deep Tendon Reflexes: Abnormal reflex:  lastpth             Results:  Labs:  Lab Results   Component Value Date    PTH 51 9 01/03/2023    CALCIUM 9 8 01/03/2023    PHOS 2 9 10/12/2018     Case Report   Surgical Pathology Report                         Case: [id number]                                    Authorizing Provider: Chaz Cason MD        Collected:           01/03/2023 [facility]               Ordering Location:     [address] [place] [address]      Received:            01/03/2023 [facility]                                      Hospital Operating Room                                                       Pathologist:           Janelle Méndez MD                                                                  Intraop:               Janelle Méndez MD                                                                  Specimens:   A) - Parathyroid, Right Superior Parathyroid?                                                        B) - Parathyroid, Right Inferior                                                           Final Diagnosis   A  Parathyroid, Right Superior:  - Benign thyroid tissue      B  Parathyroid, Right Inferior:  - Hypercellular parathyroid tissue, weight 680 mg  Electronically signed by Antonio Mcadams MD on 1/4/2023 at  3:32 PM   Comments: This is an appended report  These results have been appended to a previously preliminary verified report  Imaging  No results found  I reviewed the above laboratory and imaging data  Discussion/Summary: Status post right-sided parathyroidectomy  Doing well  Follow-up in 6 months to assess durability of operation we will repeat check of calcium, vitamin D, and PTH levels  No cyanosis, no pallor, no jaundice, no rash

## 2023-04-25 ENCOUNTER — TELEPHONE (OUTPATIENT)
Dept: HEMATOLOGY ONCOLOGY | Facility: CLINIC | Age: 68
End: 2023-04-25

## 2023-04-25 NOTE — TELEPHONE ENCOUNTER
Appointment Change  Cancel, Reschedule, Change to Virtual      Who are you speaking with? Patient   If it is not the patient, are they listed on an active communication consent form? N/A   Which provider is the appointment scheduled with? JAVON Brandon   When is the appointment scheduled? Please list date and time 07/17/23 9AM   At which location is the appointment scheduled to take place[place]   Was the appointment rescheduled or changed from an in person visit to a virtual visit? If so, please list the details of the change  07/25/23 2PM   What is the reason for the appointment change? Provider out of office   Was [facility] transport scheduled for this visit? No   Does [facility] transport need to be scheduled for the new visit (if applicable) N/A   Does the patient need an infusion appointment rescheduled? No   Does the patient have an infusion appointment scheduled? If so, when? No   Is the patient undergoing chemotherapy? No   Was the no-show policy reviewed for appointments being changed with less then 24 hours of notice?  N/A

## 2023-07-07 DIAGNOSIS — M81.8 OTHER OSTEOPOROSIS WITHOUT CURRENT PATHOLOGICAL FRACTURE: ICD-10-CM

## 2023-07-07 DIAGNOSIS — M81.8 OTHER OSTEOPOROSIS WITHOUT CURRENT PATHOLOGICAL FRACTURE: Primary | ICD-10-CM

## 2023-07-07 RX ORDER — ALENDRONATE SODIUM 70 MG/1
TABLET ORAL
Qty: 12 TABLET | Refills: 2 | Status: SHIPPED | OUTPATIENT
Start: 2023-07-07

## 2023-07-17 ENCOUNTER — APPOINTMENT (OUTPATIENT)
Dept: LAB | Facility: CLINIC | Age: 68
End: 2023-07-17
Payer: MEDICARE

## 2023-07-17 ENCOUNTER — TELEPHONE (OUTPATIENT)
Dept: HEMATOLOGY ONCOLOGY | Facility: CLINIC | Age: 68
End: 2023-07-17

## 2023-07-17 DIAGNOSIS — E89.2 STATUS POST PARATHYROIDECTOMY (HCC): Primary | ICD-10-CM

## 2023-07-17 DIAGNOSIS — E89.2 STATUS POST PARATHYROIDECTOMY (HCC): ICD-10-CM

## 2023-07-17 LAB
CALCIUM SERPL-MCNC: 9.6 MG/DL (ref 8.3–10.1)
PTH-INTACT SERPL-MCNC: 31.2 PG/ML (ref 12–88)

## 2023-07-17 PROCEDURE — 83970 ASSAY OF PARATHORMONE: CPT

## 2023-07-17 PROCEDURE — 82310 ASSAY OF CALCIUM: CPT

## 2023-07-17 PROCEDURE — 36415 COLL VENOUS BLD VENIPUNCTURE: CPT

## 2023-07-17 PROCEDURE — 82306 VITAMIN D 25 HYDROXY: CPT

## 2023-07-17 NOTE — TELEPHONE ENCOUNTER
Labs ordered. Spoke to patient and she will go back to the lab today to have it done. I apologized for the inconvenience.

## 2023-07-17 NOTE — TELEPHONE ENCOUNTER
Lab Inquiry   Who are you speaking with? self     If it is not the patient, are they listed on an active communication consent form? self   Name of ordering provider Shawn Vance   What is being requested? Updated lab orders   Lab draw location [facility]   What is the best call back number?  [phone]

## 2023-07-21 LAB
25(OH)D2 SERPL-MCNC: <1 NG/ML
25(OH)D3 SERPL-MCNC: 48 NG/ML
25(OH)D3+25(OH)D2 SERPL-MCNC: 48 NG/ML

## 2023-07-24 ENCOUNTER — TELEPHONE (OUTPATIENT)
Dept: SURGICAL ONCOLOGY | Facility: CLINIC | Age: 68
End: 2023-07-24

## 2023-07-24 NOTE — TELEPHONE ENCOUNTER
Spoke to patient about changing appointment to a virtual and patient was happy with change to virtual same time and date with Suzy Summers

## 2023-07-25 ENCOUNTER — TELEMEDICINE (OUTPATIENT)
Dept: SURGICAL ONCOLOGY | Facility: CLINIC | Age: 68
End: 2023-07-25
Payer: MEDICARE

## 2023-07-25 DIAGNOSIS — E21.3 HYPERPARATHYROIDISM (HCC): Primary | ICD-10-CM

## 2023-07-25 DIAGNOSIS — E89.2 STATUS POST PARATHYROIDECTOMY (HCC): ICD-10-CM

## 2023-07-25 PROCEDURE — 99441 PR PHYS/QHP TELEPHONE EVALUATION 5-10 MIN: CPT

## 2023-07-25 NOTE — LETTER
July 25, 2023     Blayne Butler MD  [address] [facility] [address] [facility]    Patient: Trevor Saldivar   YOB: 1955   Date of Visit: 7/25/2023       Dear Dr. Henri Pickard: Thank you for referring Taran Joseph to me for evaluation. Below are my notes for this consultation. If you have questions, please do not hesitate to call me. I look forward to following your patient along with you. Sincerely,        JAVON Crook        CC: No Recipients    Rhonda Long, [facility]  7/25/2023  2:24 PM  Sign when Signing Visit  Virtual Regular Visit    Verification of patient location:    Patient is located at Home in the following state in which I hold an active license [place]      Assessment/Plan:  Calcium and PTH levels have returned to normal range, and the patient continues to do well. We will see her on an as-needed basis in the future. She is agreeable to the plan. Problem List Items Addressed This Visit          Endocrine    Hyperparathyroidism ([address]) - Primary       Other    Status post parathyroidectomy [facility])            Reason for visit is No chief complaint on file. Encounter provider JAVON Crook    Provider located at [address] [facility]  [address] [facility] [address] [phone]      Recent Visits  No visits were found meeting these conditions. Showing recent visits within past 7 days and meeting all other requirements  Today's Visits  Date Type Provider Dept   07/25/23 Telemedicine Rhonda Long, [facility] today's visits and meeting all other requirements  Future Appointments  No visits were found meeting these conditions. Showing future appointments within next 150 days and meeting all other requirements       The patient was identified by name and date of birth.  Trevor Saldivar was informed that this is a telemedicine visit and that the visit is being conducted through Telephone. My office door was closed. No one else was in the room. She acknowledged consent and understanding of privacy and security of the video platform. The patient has agreed to participate and understands they can discontinue the visit at any time. Patient is aware this is a billable service. Will Jacques is a 79 y.o. female status-post parathyroidectomy. She reports she feels well and denies any difficulty swallowing or changes in her voice. Calcium, PTH and vitamin D levels have been obtained recently. Minimally invasive parathyroidectomy was performed on January 3, at which time right inferior and superior parathyroid glands were removed. Past Medical History:   Diagnosis Date   • Depression    • Hyperlipidemia     last assessed - 2016   • Hyperparathyroidism ([address]) 2018       Past Surgical History:   Procedure Laterality Date   •  SECTION     • [place] PARATHYROIDECTOMY/EXPLORATION PARATHYROIDS Right 1/3/2023    Procedure: MINIMALLY INVASIVE RIGHT PARATHYROIDECTOMY;  Surgeon: Darnell Saab MD;  [facility];  Service: Surgical Oncology   • US GUIDED THYROID BIOPSY  2022       Current Outpatient Medications   Medication Sig Dispense Refill   • alendronate (FOSAMAX) 70 mg tablet TAKE 1 TABLET BY MOUTH EVERY 7 DAYS 12 tablet 2   • atorvastatin (LIPITOR) 20 mg tablet TAKE ONE TABLET BY MOUTH EVERY DAY (Patient taking differently: Take 20 mg by mouth daily with dinner) 90 tablet 3   • Cholecalciferol 50 MCG (2000) CAPS Take by mouth daily     • fluticasone (FLONASE) 50 mcg/act nasal spray 2 sprays into each nostril if needed     • timolol (TIMOPTIC) 0.5 % ophthalmic solution Administer 1 drop to both eyes 2 (two) times a day       No current facility-administered medications for this visit.         Allergies   Allergen Reactions   • Sulfa Antibiotics Hives       Review of Systems   Constitutional: Negative for activity change, appetite change and unexpected weight change. HENT: Negative. Negative for trouble swallowing and voice change. Respiratory: Negative. Negative for cough and shortness of breath. Gastrointestinal: Negative. Musculoskeletal: Negative. Skin: Negative. Negative for wound. Neurological: Negative. Hematological: Negative. Negative for adenopathy. Psychiatric/Behavioral: Negative.         Collected Updated Procedure    07/17/2023 1223 07/21/2023 0106 Vitamin D Panel [[id number]]  Component Value Units   25-HYDROXY VIT D 48  ng/mL   25-Hydroxy D2 <1.0  ng/mL   25-HYDROXY VIT D3 48  ng/mL          07/17/2023 1223 07/17/2023 2044 Calcium [[id number]]   Blood    Component Value Units   Calcium 9.6 mg/dL          07/17/2023 1223 07/17/2023 2317 PTH, intact [[id number]]   Blood    Component Value Units   PTH 31.2 pg/mL            Visit Time  Total Visit Duration: 6 minutes

## 2023-07-25 NOTE — PROGRESS NOTES
Virtual Regular Visit    Verification of patient location:    Patient is located at Home in the following state in which I hold an active license [place]      Assessment/Plan:  Calcium and PTH levels have returned to normal range, and the patient continues to do well. We will see her on an as-needed basis in the future. She is agreeable to the plan. Problem List Items Addressed This Visit        Endocrine    Hyperparathyroidism ([address]) - Primary       Other    Status post parathyroidectomy [facility])            Reason for visit is No chief complaint on file. Encounter provider JAVON Betancourt    Provider located at [address] [place] [id number] [phone]      Recent Visits  No visits were found meeting these conditions. Showing recent visits within past 7 days and meeting all other requirements  Today's Visits  Date Type Provider Dept   07/25/23 Telemedicine Rhonda Mariusz Trujillo, [address] [facility] today's visits and meeting all other requirements  Future Appointments  No visits were found meeting these conditions. Showing future appointments within next 150 days and meeting all other requirements       The patient was identified by name and date of birth. Mery Yu was informed that this is a telemedicine visit and that the visit is being conducted through Telephone. My office door was closed. No one else was in the room. She acknowledged consent and understanding of privacy and security of the video platform. The patient has agreed to participate and understands they can discontinue the visit at any time. Patient is aware this is a billable service. Subjective  Mery Yu is a 79 y.o. female status-post parathyroidectomy. She reports she feels well and denies any difficulty swallowing or changes in her voice.   Calcium, PTH and vitamin D levels have been obtained recently. Minimally invasive parathyroidectomy was performed on January 3, at which time right inferior and superior parathyroid glands were removed. Past Medical History:   Diagnosis Date   • Depression    • Hyperlipidemia     last assessed - 2016   • Hyperparathyroidism ([address]) 2018       Past Surgical History:   Procedure Laterality Date   •  SECTION     • [place] PARATHYROIDECTOMY/EXPLORATION PARATHYROIDS Right 1/3/2023    Procedure: MINIMALLY INVASIVE RIGHT PARATHYROIDECTOMY;  Surgeon: Nicole Martinez MD;  [facility];  Service: Surgical Oncology   • US GUIDED THYROID BIOPSY  2022       Current Outpatient Medications   Medication Sig Dispense Refill   • alendronate (FOSAMAX) 70 mg tablet TAKE 1 TABLET BY MOUTH EVERY 7 DAYS 12 tablet 2   • atorvastatin (LIPITOR) 20 mg tablet TAKE ONE TABLET BY MOUTH EVERY DAY (Patient taking differently: Take 20 mg by mouth daily with dinner) 90 tablet 3   • Cholecalciferol 50 MCG (2000) CAPS Take by mouth daily     • fluticasone (FLONASE) 50 mcg/act nasal spray 2 sprays into each nostril if needed     • timolol (TIMOPTIC) 0.5 % ophthalmic solution Administer 1 drop to both eyes 2 (two) times a day       No current facility-administered medications for this visit. Allergies   Allergen Reactions   • Sulfa Antibiotics Hives       Review of Systems   Constitutional: Negative for activity change, appetite change and unexpected weight change. HENT: Negative. Negative for trouble swallowing and voice change. Respiratory: Negative. Negative for cough and shortness of breath. Gastrointestinal: Negative. Musculoskeletal: Negative. Skin: Negative. Negative for wound. Neurological: Negative. Hematological: Negative. Negative for adenopathy. Psychiatric/Behavioral: Negative.         Collected Updated Procedure    2023 1223 2023 0106 Vitamin D Panel [[id number]]  Component Value Units   25-HYDROXY VIT D 48  ng/mL   25-Hydroxy D2 <1.0  ng/mL   25-HYDROXY VIT D3 48  ng/mL          07/17/2023 1223 07/17/2023 2044 Calcium [[id number]]   Blood    Component Value Units   Calcium 9.6 mg/dL          07/17/2023 1223 07/17/2023 2317 PTH, intact [[id number]]   Blood    Component Value Units   PTH 31.2 pg/mL            Visit Time  Total Visit Duration: 6 minutes

## 2023-08-31 DIAGNOSIS — E78.00 HIGH CHOLESTEROL: ICD-10-CM

## 2023-08-31 RX ORDER — ATORVASTATIN CALCIUM 20 MG/1
TABLET, FILM COATED ORAL
Qty: 90 TABLET | Refills: 3 | Status: SHIPPED | OUTPATIENT
Start: 2023-08-31

## 2023-10-04 ENCOUNTER — OFFICE VISIT (OUTPATIENT)
Dept: FAMILY MEDICINE CLINIC | Facility: CLINIC | Age: 68
End: 2023-10-04
Payer: MEDICARE

## 2023-10-04 VITALS
HEART RATE: 64 BPM | OXYGEN SATURATION: 98 % | HEIGHT: 63 IN | DIASTOLIC BLOOD PRESSURE: 82 MMHG | BODY MASS INDEX: 28.77 KG/M2 | WEIGHT: 162.4 LBS | TEMPERATURE: 97.9 F | SYSTOLIC BLOOD PRESSURE: 126 MMHG

## 2023-10-04 DIAGNOSIS — F17.210 CIGARETTE NICOTINE DEPENDENCE WITHOUT COMPLICATION: ICD-10-CM

## 2023-10-04 DIAGNOSIS — Z00.00 MEDICARE ANNUAL WELLNESS VISIT, SUBSEQUENT: Primary | ICD-10-CM

## 2023-10-04 DIAGNOSIS — Z12.31 ENCOUNTER FOR SCREENING MAMMOGRAM FOR BREAST CANCER: ICD-10-CM

## 2023-10-04 PROBLEM — Z53.20 LUNG CANCER SCREENING DECLINED BY PATIENT: Status: RESOLVED | Noted: 2023-03-15 | Resolved: 2023-10-04

## 2023-10-04 PROBLEM — F33.9 DEPRESSION, RECURRENT (HCC): Status: RESOLVED | Noted: 2023-10-04 | Resolved: 2023-10-04

## 2023-10-04 PROBLEM — F33.9 DEPRESSION, RECURRENT (HCC): Status: ACTIVE | Noted: 2023-10-04

## 2023-10-04 PROCEDURE — G0439 PPPS, SUBSEQ VISIT: HCPCS | Performed by: FAMILY MEDICINE

## 2023-10-04 RX ORDER — MULTIVITAMIN
1 CAPSULE ORAL DAILY
COMMUNITY

## 2023-10-04 NOTE — PROGRESS NOTES
Assessment and Plan:     Problem List Items Addressed This Visit    None  Visit Diagnoses     Medicare annual wellness visit, subsequent    -  Primary    Encounter for screening mammogram for breast cancer        Relevant Orders    Mammo screening bilateral w 3d & cad    Cigarette nicotine dependence without complication        Relevant Orders    CT lung screening program        BMI Counseling: Body mass index is 28.77 kg/m². The BMI is above normal. Nutrition recommendations include encouraging healthy choices of fruits and vegetables. Exercise recommendations include exercising 3-5 times per week. Rationale for BMI follow-up plan is due to patient being overweight or obese. Depression Screening and Follow-up Plan: Patient was screened for depression during today's encounter. They screened negative with a PHQ-2 score of 0. Preventive health issues were discussed with patient, and age appropriate screening tests were ordered as noted in patient's After Visit Summary. Personalized health advice and appropriate referrals for health education or preventive services given if needed, as noted in patient's After Visit Summary. History of Present Illness:     Patient presents for a Medicare Wellness Visit    No acute concerns     Patient Care Team:  Josseline Patel MD as PCP - Todd Hernandez MD as PCP - Endocrinology (Endocrinology)  Josseline Patel MD as PCP - [facility] DANE Shepard (RTE)  Josseline Patel MD as PCP - PCP-[facility] [address] [facility] (RTE)  Conchis Espinoza MD (Oncology)  Gio Villa as Nurse Practitioner (Endocrinology)     Review of Systems:     Review of Systems   Constitutional: Negative for activity change. Respiratory: Negative for chest tightness and shortness of breath. Cardiovascular: Negative for chest pain and leg swelling. Neurological: Negative for headaches.    Psychiatric/Behavioral: Positive for dysphoric mood (comes and goes, some good days, some bad, does not want treatment for depression). The patient is not nervous/anxious.          Problem List:     Patient Active Problem List   Diagnosis   • High cholesterol   • Current every day smoker   • Environmental allergies   • Hyperparathyroidism ([address])   • Post-menopausal   • Other osteoporosis without current pathological fracture   • Influenza vaccination declined   • Constipation   • Status post parathyroidectomy [facility])      Past Medical and Surgical History:     Past Medical History:   Diagnosis Date   • Depression    • Hyperlipidemia     last assessed - 2016   • Hyperparathyroidism ([address]) 2018     Past Surgical History:   Procedure Laterality Date   •  SECTION     • NH PARATHYROIDECTOMY/EXPLORATION PARATHYROIDS Right 1/3/2023    Procedure: MINIMALLY INVASIVE RIGHT PARATHYROIDECTOMY;  Surgeon: Dianna Gonsalez MD;  Location: [facility];  Service: Surgical Oncology   • US GUIDED THYROID BIOPSY  2022      Family History:     Family History   Problem Relation Age of Onset   • Cancer Mother    • Cancer Father    • Cancer Sister    • Breast cancer Sister    • No Known Problems Maternal Grandmother    • No Known Problems Paternal Grandmother    • No Known Problems Maternal Aunt    • No Known Problems Maternal Aunt    • No Known Problems Maternal Aunt    • No Known Problems Paternal Aunt    • No Known Problems Paternal Aunt       Social History:     Social History     Socioeconomic History   • Marital status: /Civil Union     Spouse name: None   • Number of children: None   • Years of education: None   • Highest education level: None   Occupational History   • None   Tobacco Use   • Smoking status: Every Day     Packs/day: 0.50     Years: 30.00     Total pack years: 15.00     Types: Cigarettes   • Smokeless tobacco: Never   Vaping Use   • Vaping Use: Never used   Substance and Sexual Activity   • Alcohol use: No   • Drug use: No   • Sexual activity: None   Other Topics Concern   • None   Social History Narrative    Stress at home     Social Determinants of Health     Financial Resource Strain: Low Risk  (10/4/2023)    Overall Financial Resource Strain (CARDIA)    • Difficulty of Paying Living Expenses: Not very hard   Food Insecurity: Not on file   Transportation Needs: No Transportation Needs (10/4/2023)    PRAPARE - Transportation    • Lack of Transportation (Medical): No    • Lack of Transportation (Non-Medical): No   Physical Activity: Not on file   Stress: Not on file   Social Connections: Not on file   Intimate Partner Violence: Not on file   Housing Stability: Not on file      Medications and Allergies:     Current Outpatient Medications   Medication Sig Dispense Refill   • alendronate (FOSAMAX) 70 mg tablet TAKE 1 TABLET BY MOUTH EVERY 7 DAYS 12 tablet 2   • atorvastatin (LIPITOR) 20 mg tablet TAKE ONE TABLET BY MOUTH EVERY DAY 90 tablet 3   • Calcium Carbonate-Vit D-Min (CALCIUM 1200 PO) Take by mouth     • Cholecalciferol 50 MCG (2000 UT) CAPS Take by mouth daily     • fluticasone (FLONASE) 50 mcg/act nasal spray 2 sprays into each nostril if needed     • Multiple Vitamin (multivitamin) capsule Take 1 capsule by mouth daily     • timolol (TIMOPTIC) 0.5 % ophthalmic solution Administer 1 drop to both eyes 2 (two) times a day       No current facility-administered medications for this visit.      Allergies   Allergen Reactions   • Sulfa Antibiotics Hives      Immunizations:     Immunization History   Administered Date(s) Administered   • COVID-19 MODERNA VACC 0.5 ML IM 04/22/2021, 05/24/2021   • Tdap 08/22/2014      Health Maintenance:         Topic Date Due   • Lung Cancer Screening  Never done   • Colorectal Cancer Screening  03/07/2023   • Breast Cancer Screening: Mammogram  11/30/2023   • Cervical Cancer Screening  06/15/2027   • Hepatitis C Screening  Completed         Topic Date Due   • COVID-19 Vaccine (3 - Moderna series) 07/19/2021      Medicare Screening Tests and Risk Assessments: Dora Howard is here for her Subsequent Wellness visit. Last Medicare Wellness visit information reviewed, patient interviewed and updates made to the record today. Health Risk Assessment:   Patient rates overall health as good. Patient feels that their physical health rating is same. Patient is very satisfied with their life. Eyesight was rated as same. Hearing was rated as same. Patient feels that their emotional and mental health rating is same. Patients states they are never, rarely angry. Patient states they are sometimes unusually tired/fatigued. Pain experienced in the last 7 days has been none. Patient states that she has experienced no weight loss or gain in last 6 months. Depression Screening:   PHQ-2 Score: 0      Fall Risk Screening: In the past year, patient has experienced: no history of falling in past year      Urinary Incontinence Screening:   Patient has not leaked urine accidently in the last six months. Home Safety:  Patient does not have trouble with stairs inside or outside of their home. Patient has working smoke alarms and has working carbon monoxide detector. Home safety hazards include: none. Nutrition:   Current diet is Regular. Medications:   Patient is not currently taking any over-the-counter supplements. Patient is able to manage medications. Activities of Daily Living (ADLs)/Instrumental Activities of Daily Living (IADLs):   Walk and transfer into and out of bed and chair?: Yes  Dress and groom yourself?: Yes    Bathe or shower yourself?: Yes    Feed yourself? Yes  Do your laundry/housekeeping?: Yes  Manage your money, pay your bills and track your expenses?: Yes  Make your own meals?: Yes    Do your own shopping?: Yes    Previous Hospitalizations:   Any hospitalizations or ED visits within the last 12 months?: No      Advance Care Planning:   Living will: No    Durable POA for healthcare: No    Advanced directive: No    Advanced directive counseling given:  Yes Five wishes given: Yes      Cognitive Screening:   Provider or family/friend/caregiver concerned regarding cognition?: No    PREVENTIVE SCREENINGS      Cardiovascular Screening:    General: Screening Not Indicated, History Lipid Disorder, Risks and Benefits Discussed and Screening Current      Diabetes Screening:     General: Screening Current and Risks and Benefits Discussed      Colorectal Cancer Screening:     General: Screening Current      Breast Cancer Screening:     General: Screening Current and Risks and Benefits Discussed      Cervical Cancer Screening:    General: Screening Not Indicated, Risks and Benefits Discussed and Screening Current      Osteoporosis Screening:    General: Screening Not Indicated, History Osteoporosis and Risks and Benefits Discussed    Due for: DXA Axial      Abdominal Aortic Aneurysm (AAA) Screening:        General: Screening Not Indicated      Lung Cancer Screening:     General: Screening Not Indicated, Risks and Benefits Discussed and Patient Declines      Hepatitis C Screening:    General: Screening Current    Screening, Brief Intervention, and Referral to Treatment (SBIRT)    Screening  Typical number of drinks in a day: 0  Typical number of drinks in a week: 0  Interpretation: Low risk drinking behavior. Single Item Drug Screening:  How often have you used an illegal drug (including marijuana) or a prescription medication for non-medical reasons in the past year? never    Single Item Drug Screen Score: 0  Interpretation: Negative screen for possible drug use disorder    Brief Intervention  Alcohol & drug use screenings were reviewed. No concerns regarding substance use disorder identified. No results found.      Physical Exam:     /82 (BP Location: Left arm, Patient Position: Sitting)   Pulse 64   Temp 97.9 °F (36.6 °C) (Temporal)   Ht 5' 3" (1.6 m)   Wt 73.7 kg (162 lb 6.4 oz)   SpO2 98%   BMI 28.77 kg/m²     Physical Exam  Vitals and nursing note reviewed. Constitutional:       General: She is not in acute distress. Appearance: She is well-developed. She is not diaphoretic. HENT:      Head: Normocephalic and atraumatic. Right Ear: External ear normal.      Left Ear: External ear normal.   Eyes:      Conjunctiva/sclera: Conjunctivae normal.   Cardiovascular:      Rate and Rhythm: Normal rate and regular rhythm. Heart sounds: Normal heart sounds. No murmur heard. No friction rub. No gallop. Pulmonary:      Effort: Pulmonary effort is normal. No respiratory distress. Breath sounds: Normal breath sounds. No stridor. No wheezing or rales. Chest:      Chest wall: No tenderness. Neurological:      Mental Status: She is alert. Psychiatric:         Mood and Affect: Mood is depressed. Behavior: Behavior normal.         Thought Content:  Thought content normal.         Judgment: Judgment normal.          Katelynn Scott MD

## 2023-10-04 NOTE — PATIENT INSTRUCTIONS
Medicare Preventive Visit Patient Instructions  Thank you for completing your Welcome to Medicare Visit or Medicare Annual Wellness Visit today. Your next wellness visit will be due in one year (10/4/2024). The screening/preventive services that you may require over the next 5-10 years are detailed below. Some tests may not apply to you based off risk factors and/or age. Screening tests ordered at today's visit but not completed yet may show as past due. Also, please note that scanned in results may not display below. Preventive Screenings:  Service Recommendations Previous Testing/Comments   Colorectal Cancer Screening  * Colonoscopy    * Fecal Occult Blood Test (FOBT)/Fecal Immunochemical Test (FIT)  * Fecal DNA/Cologuard Test  * Flexible Sigmoidoscopy Age: 43-73 years old   Colonoscopy: every 10 years (may be performed more frequently if at higher risk)  OR  FOBT/FIT: every 1 year  OR  Cologuard: every 3 years  OR  Sigmoidoscopy: every 5 years  Screening may be recommended earlier than age 39 if at higher risk for colorectal cancer. Also, an individualized decision between you and your healthcare provider will decide whether screening between the ages of 77-80 would be appropriate. Colonoscopy: 04/06/2016  FOBT/FIT: 03/06/2023  Cologuard: Not on file  Sigmoidoscopy: Not on file    Screening Current     Breast Cancer Screening Age: 36 years old  Frequency: every 1-2 years  Not required if history of left and right mastectomy Mammogram: 11/30/2022    Screening Current   Cervical Cancer Screening Between the ages of 21-29, pap smear recommended once every 3 years. Between the ages of 32-69, can perform pap smear with HPV co-testing every 5 years.    Recommendations may differ for women with a history of total hysterectomy, cervical cancer, or abnormal pap smears in past. Pap Smear: 06/15/2022    Screening Not Indicated   Hepatitis C Screening Once for adults born between 1945 and 1965  More frequently in patients at high risk for Hepatitis C Hep C Antibody: 06/03/2021    Screening Current   Diabetes Screening 1-2 times per year if you're at risk for diabetes or have pre-diabetes Fasting glucose: 74 mg/dL (2/20/2023)  A1C: No results in last 5 years (No results in last 5 years)  Screening Current   Cholesterol Screening Once every 5 years if you don't have a lipid disorder. May order more often based on risk factors. Lipid panel: 02/20/2023    Screening Not Indicated  History Lipid Disorder     Other Preventive Screenings Covered by Medicare:  1. Abdominal Aortic Aneurysm (AAA) Screening: covered once if your at risk. You're considered to be at risk if you have a family history of AAA. 2. Lung Cancer Screening: covers low dose CT scan once per year if you meet all of the following conditions: (1) Age 48-67; (2) No signs or symptoms of lung cancer; (3) Current smoker or have quit smoking within the last 15 years; (4) You have a tobacco smoking history of at least 20 pack years (packs per day multiplied by number of years you smoked); (5) You get a written order from a healthcare provider. 3. Glaucoma Screening: covered annually if you're considered high risk: (1) You have diabetes OR (2) Family history of glaucoma OR (3)  aged 48 and older OR (3)  American aged 72 and older  3. Osteoporosis Screening: covered every 2 years if you meet one of the following conditions: (1) You're estrogen deficient and at risk for osteoporosis based off medical history and other findings; (2) Have a vertebral abnormality; (3) On glucocorticoid therapy for more than 3 months; (4) Have primary hyperparathyroidism; (5) On osteoporosis medications and need to assess response to drug therapy. · Last bone density test (DXA Scan): 11/15/2021.  5. HIV Screening: covered annually if you're between the age of 15-65. Also covered annually if you are younger than 13 and older than 72 with risk factors for HIV infection. For pregnant patients, it is covered up to 3 times per pregnancy. Immunizations:  Immunization Recommendations   Influenza Vaccine Annual influenza vaccination during flu season is recommended for all persons aged >= 6 months who do not have contraindications   Pneumococcal Vaccine   * Pneumococcal conjugate vaccine = PCV13 (Prevnar 13), PCV15 (Vaxneuvance), PCV20 (Prevnar 20)  * Pneumococcal polysaccharide vaccine = PPSV23 (Pneumovax) Adults 20-63 years old: 1-3 doses may be recommended based on certain risk factors  Adults 72 years old: 1-2 doses may be recommended based off what pneumonia vaccine you previously received   Hepatitis B Vaccine 3 dose series if at intermediate or high risk (ex: diabetes, end stage renal disease, liver disease)   Tetanus (Td) Vaccine - COST NOT COVERED BY MEDICARE PART B Following completion of primary series, a booster dose should be given every 10 years to maintain immunity against tetanus. Td may also be given as tetanus wound prophylaxis. Tdap Vaccine - COST NOT COVERED BY MEDICARE PART B Recommended at least once for all adults. For pregnant patients, recommended with each pregnancy. Shingles Vaccine (Shingrix) - COST NOT COVERED BY MEDICARE PART B  2 shot series recommended in those aged 48 and above     Health Maintenance Due:      Topic Date Due   • Lung Cancer Screening  Never done   • Colorectal Cancer Screening  03/07/2023   • Breast Cancer Screening: Mammogram  11/30/2023   • Cervical Cancer Screening  06/15/2027   • Hepatitis C Screening  Completed     Immunizations Due:      Topic Date Due   • COVID-19 Vaccine (3 - [place] [employer] series) 07/19/2021     Advance Directives   What are advance directives? Advance directives are legal documents that state your wishes and plans for medical care. These plans are made ahead of time in case you lose your ability to make decisions for yourself.  Advance directives can apply to any medical decision, such as the treatments you want, and if you want to donate organs. What are the types of advance directives? There are many types of advance directives, and each state has rules about how to use them. You may choose a combination of any of the following:  · Living will: This is a written record of the treatment you want. You can also choose which treatments you do not want, which to limit, and which to stop at a certain time. This includes surgery, medicine, IV fluid, and tube feedings. · Durable power of  for healthcare [facility]): This is a written record that states who you want to make healthcare choices for you when you are unable to make them for yourself. This person, called a proxy, is usually a family member or a friend. You may choose more than 1 proxy. · Do not resuscitate (DNR) order:  A DNR order is used in case your heart stops beating or you stop breathing. It is a request not to have certain forms of treatment, such as CPR. A DNR order may be included in other types of advance directives. · Medical directive: This covers the care that you want if you are in a coma, near death, or unable to make decisions for yourself. You can list the treatments you want for each condition. Treatment may include pain medicine, surgery, blood transfusions, dialysis, IV or tube feedings, and a ventilator (breathing machine). · Values history: This document has questions about your views, beliefs, and how you feel and think about life. This information can help others choose the care that you would choose. Why are advance directives important? An advance directive helps you control your care. Although spoken wishes may be used, it is better to have your wishes written down. Spoken wishes can be misunderstood, or not followed. Treatments may be given even if you do not want them. An advance directive may make it easier for your family to make difficult choices about your care.    Cigarette Smoking and Your Health   Risks to your health if you smoke:  Nicotine and other chemicals found in tobacco damage every cell in your body. Even if you are a light smoker, you have an increased risk for cancer, heart disease, and lung disease. If you are pregnant or have diabetes, smoking increases your risk for complications. Benefits to your health if you stop smoking:   · You decrease respiratory symptoms such as coughing, wheezing, and shortness of breath. · You reduce your risk for cancers of the lung, mouth, throat, kidney, bladder, pancreas, stomach, and cervix. If you already have cancer, you increase the benefits of chemotherapy. You also reduce your risk for cancer returning or a second cancer from developing. · You reduce your risk for heart disease, blood clots, heart attack, and stroke. · You reduce your risk for lung infections, and diseases such as pneumonia, asthma, chronic bronchitis, and emphysema. · Your circulation improves. More oxygen can be delivered to your body. If you have diabetes, you lower your risk for complications, such as kidney, artery, and eye diseases. You also lower your risk for nerve damage. Nerve damage can lead to amputations, poor vision, and blindness. · You improve your body's ability to heal and to fight infections. For more information and support to stop smoking:   · [employer] go[employer]  Phone: 9- 794 - 520-8332  Web Address: www.[employer]  Weight Management   Why it is important to manage your weight:  Being overweight increases your risk of health conditions such as heart disease, high blood pressure, type 2 diabetes, and certain types of cancer. It can also increase your risk for osteoarthritis, sleep apnea, and other respiratory problems. Aim for a slow, steady weight loss. Even a small amount of weight loss can lower your risk of health problems. How to lose weight safely:  A safe and healthy way to lose weight is to eat fewer calories and get regular exercise.  You can lose up about 1 pound a week by decreasing the number of calories you eat by 500 calories each day. Healthy meal plan for weight management:  A healthy meal plan includes a variety of foods, contains fewer calories, and helps you stay healthy. A healthy meal plan includes the following:  · Eat whole-grain foods more often. A healthy meal plan should contain fiber. Fiber is the part of grains, fruits, and vegetables that is not broken down by your body. Whole-grain foods are healthy and provide extra fiber in your diet. Some examples of whole-grain foods are whole-wheat breads and pastas, oatmeal, brown rice, and bulgur. · Eat a variety of vegetables every day. Include dark, leafy greens such as spinach, kale, nicole greens, and mustard greens. Eat yellow and orange vegetables such as carrots, sweet potatoes, and winter squash. · Eat a variety of fruits every day. Choose fresh or canned fruit (canned in its own juice or light syrup) instead of juice. Fruit juice has very little or no fiber. · Eat low-fat dairy foods. Drink fat-free (skim) milk or 1% milk. Eat fat-free yogurt and low-fat cottage cheese. Try low-fat cheeses such as mozzarella and other reduced-fat cheeses. · Choose meat and other protein foods that are low in fat. Choose beans or other legumes such as split peas or lentils. Choose fish, skinless poultry (chicken or turkey), or lean cuts of red meat (beef or pork). Before you cook meat or poultry, cut off any visible fat. · Use less fat and oil. Try baking foods instead of frying them. Add less fat, such as margarine, sour cream, regular salad dressing and mayonnaise to foods. Eat fewer high-fat foods. Some examples of high-fat foods include french fries, doughnuts, ice cream, and cakes. · Eat fewer sweets. Limit foods and drinks that are high in sugar. This includes candy, cookies, regular soda, and sweetened drinks. Exercise:  Exercise at least 30 minutes per day on most days of the week.  Some examples of exercise include walking, biking, dancing, and swimming. You can also fit in more physical activity by taking the stairs instead of the elevator or parking farther away from stores. Ask your healthcare provider about the best exercise plan for you. © Copyright [employer] 2018 Information is for End User's use only and may not be sold, redistributed or otherwise used for commercial purposes.  All illustrations and images included in CareNotes® are the copyrighted property of [employer].A.M., Inc. or  [employer] [address]

## 2023-10-12 NOTE — TELEPHONE ENCOUNTER
Notified Gualberto Wood about the PTH elevation   Please put in referral for Abilio Melchor MD Cyclosporine Counseling:  I discussed with the patient the risks of cyclosporine including but not limited to hypertension, gingival hyperplasia,myelosuppression, immunosuppression, liver damage, kidney damage, neurotoxicity, lymphoma, and serious infections. The patient understands that monitoring is required including baseline blood pressure, CBC, CMP, lipid panel and uric acid, and then 1-2 times monthly CMP and blood pressure.

## 2023-10-24 ENCOUNTER — HOSPITAL ENCOUNTER (EMERGENCY)
Facility: HOSPITAL | Age: 68
Discharge: HOME | End: 2023-10-24
Attending: EMERGENCY MEDICINE
Payer: MEDICARE

## 2023-10-24 ENCOUNTER — OFFICE VISIT (OUTPATIENT)
Dept: URGENT CARE | Facility: CLINIC | Age: 68
End: 2023-10-24
Payer: MEDICARE

## 2023-10-24 ENCOUNTER — APPOINTMENT (EMERGENCY)
Dept: ULTRASOUND IMAGING | Facility: HOSPITAL | Age: 68
End: 2023-10-24
Payer: MEDICARE

## 2023-10-24 ENCOUNTER — APPOINTMENT (EMERGENCY)
Dept: CT IMAGING | Facility: HOSPITAL | Age: 68
End: 2023-10-24
Payer: MEDICARE

## 2023-10-24 VITALS
BODY MASS INDEX: 28.35 KG/M2 | TEMPERATURE: 98.4 F | DIASTOLIC BLOOD PRESSURE: 84 MMHG | SYSTOLIC BLOOD PRESSURE: 139 MMHG | HEART RATE: 60 BPM | OXYGEN SATURATION: 99 % | RESPIRATION RATE: 18 BRPM | HEIGHT: 63 IN | WEIGHT: 160 LBS

## 2023-10-24 VITALS
OXYGEN SATURATION: 96 % | BODY MASS INDEX: 28.35 KG/M2 | HEART RATE: 62 BPM | DIASTOLIC BLOOD PRESSURE: 83 MMHG | RESPIRATION RATE: 18 BRPM | SYSTOLIC BLOOD PRESSURE: 127 MMHG | HEIGHT: 63 IN | WEIGHT: 160 LBS | TEMPERATURE: 97.6 F

## 2023-10-24 DIAGNOSIS — K80.20 CHOLELITHIASIS: ICD-10-CM

## 2023-10-24 DIAGNOSIS — R10.9 RIGHT SIDED ABDOMINAL PAIN: ICD-10-CM

## 2023-10-24 DIAGNOSIS — K80.50 BILIARY COLIC: Primary | ICD-10-CM

## 2023-10-24 DIAGNOSIS — R10.11 RIGHT UPPER QUADRANT ABDOMINAL PAIN: Primary | ICD-10-CM

## 2023-10-24 LAB
ALBUMIN SERPL BCP-MCNC: 3.7 G/DL (ref 3.5–5)
ALP SERPL-CCNC: 42 U/L (ref 34–104)
ALT SERPL W P-5'-P-CCNC: 14 U/L (ref 7–52)
ANION GAP SERPL CALCULATED.3IONS-SCNC: 7 MMOL/L
APTT PPP: 27 SECONDS (ref 23–37)
AST SERPL W P-5'-P-CCNC: 14 U/L (ref 13–39)
BASOPHILS # BLD AUTO: 0.02 THOUSANDS/ÂΜL (ref 0–0.1)
BASOPHILS NFR BLD AUTO: 0 % (ref 0–1)
BILIRUB SERPL-MCNC: 0.4 MG/DL (ref 0.2–1)
BILIRUB UR QL STRIP: NEGATIVE
BUN SERPL-MCNC: 13 MG/DL (ref 5–25)
CALCIUM SERPL-MCNC: 8.1 MG/DL (ref 8.4–10.2)
CHLORIDE SERPL-SCNC: 109 MMOL/L (ref 96–108)
CLARITY UR: CLEAR
CO2 SERPL-SCNC: 24 MMOL/L (ref 21–32)
COLOR UR: YELLOW
CREAT SERPL-MCNC: 0.7 MG/DL (ref 0.6–1.3)
EOSINOPHIL # BLD AUTO: 0.12 THOUSAND/ÂΜL (ref 0–0.61)
EOSINOPHIL NFR BLD AUTO: 2 % (ref 0–6)
ERYTHROCYTE [DISTWIDTH] IN BLOOD BY AUTOMATED COUNT: 12.8 % (ref 11.6–15.1)
GFR SERPL CREATININE-BSD FRML MDRD: 90 ML/MIN/1.73SQ M
GLUCOSE SERPL-MCNC: 83 MG/DL (ref 65–140)
GLUCOSE UR STRIP-MCNC: NEGATIVE MG/DL
HCT VFR BLD AUTO: 44.2 % (ref 34.8–46.1)
HGB BLD-MCNC: 14.7 G/DL (ref 11.5–15.4)
HGB UR QL STRIP.AUTO: NEGATIVE
IMM GRANULOCYTES # BLD AUTO: 0.01 THOUSAND/UL (ref 0–0.2)
IMM GRANULOCYTES NFR BLD AUTO: 0 % (ref 0–2)
INR PPP: 1 (ref 0.84–1.19)
KETONES UR STRIP-MCNC: NEGATIVE MG/DL
LEUKOCYTE ESTERASE UR QL STRIP: NEGATIVE
LIPASE SERPL-CCNC: 13 U/L (ref 11–82)
LYMPHOCYTES # BLD AUTO: 1.49 THOUSANDS/ÂΜL (ref 0.6–4.47)
LYMPHOCYTES NFR BLD AUTO: 30 % (ref 14–44)
MCH RBC QN AUTO: 31.3 PG (ref 26.8–34.3)
MCHC RBC AUTO-ENTMCNC: 33.3 G/DL (ref 31.4–37.4)
MCV RBC AUTO: 94 FL (ref 82–98)
MONOCYTES # BLD AUTO: 0.44 THOUSAND/ÂΜL (ref 0.17–1.22)
MONOCYTES NFR BLD AUTO: 9 % (ref 4–12)
NEUTROPHILS # BLD AUTO: 2.9 THOUSANDS/ÂΜL (ref 1.85–7.62)
NEUTS SEG NFR BLD AUTO: 59 % (ref 43–75)
NITRITE UR QL STRIP: NEGATIVE
NRBC BLD AUTO-RTO: 0 /100 WBCS
PH UR STRIP.AUTO: 7 [PH]
PLATELET # BLD AUTO: 202 THOUSANDS/UL (ref 149–390)
PMV BLD AUTO: 10.8 FL (ref 8.9–12.7)
POTASSIUM SERPL-SCNC: 3.3 MMOL/L (ref 3.5–5.3)
PROT SERPL-MCNC: 6.2 G/DL (ref 6.4–8.4)
PROT UR STRIP-MCNC: NEGATIVE MG/DL
PROTHROMBIN TIME: 13.4 SECONDS (ref 11.6–14.5)
RBC # BLD AUTO: 4.69 MILLION/UL (ref 3.81–5.12)
SL AMB  POCT GLUCOSE, UA: NEGATIVE
SL AMB LEUKOCYTE ESTERASE,UA: NEGATIVE
SL AMB POCT BILIRUBIN,UA: NEGATIVE
SL AMB POCT BLOOD,UA: NEGATIVE
SL AMB POCT CLARITY,UA: NORMAL
SL AMB POCT COLOR,UA: YELLOW
SL AMB POCT KETONES,UA: NEGATIVE
SL AMB POCT NITRITE,UA: NEGATIVE
SL AMB POCT PH,UA: 7.5
SL AMB POCT SPECIFIC GRAVITY,UA: 1
SL AMB POCT URINE PROTEIN: NEGATIVE
SL AMB POCT UROBILINOGEN: 0.2
SODIUM SERPL-SCNC: 140 MMOL/L (ref 135–147)
SP GR UR STRIP.AUTO: 1.01
UROBILINOGEN UR QL STRIP.AUTO: 0.2 E.U./DL
WBC # BLD AUTO: 4.98 THOUSAND/UL (ref 4.31–10.16)

## 2023-10-24 PROCEDURE — 99213 OFFICE O/P EST LOW 20 MIN: CPT | Performed by: PHYSICIAN ASSISTANT

## 2023-10-24 PROCEDURE — 80053 COMPREHEN METABOLIC PANEL: CPT | Performed by: EMERGENCY MEDICINE

## 2023-10-24 PROCEDURE — 36415 COLL VENOUS BLD VENIPUNCTURE: CPT | Performed by: EMERGENCY MEDICINE

## 2023-10-24 PROCEDURE — 85025 COMPLETE CBC W/AUTO DIFF WBC: CPT | Performed by: EMERGENCY MEDICINE

## 2023-10-24 PROCEDURE — 83690 ASSAY OF LIPASE: CPT | Performed by: EMERGENCY MEDICINE

## 2023-10-24 PROCEDURE — 85730 THROMBOPLASTIN TIME PARTIAL: CPT | Performed by: EMERGENCY MEDICINE

## 2023-10-24 PROCEDURE — 96361 HYDRATE IV INFUSION ADD-ON: CPT

## 2023-10-24 PROCEDURE — 74177 CT ABD & PELVIS W/CONTRAST: CPT

## 2023-10-24 PROCEDURE — 99284 EMERGENCY DEPT VISIT MOD MDM: CPT | Performed by: EMERGENCY MEDICINE

## 2023-10-24 PROCEDURE — G1004 CDSM NDSC: HCPCS

## 2023-10-24 PROCEDURE — G0463 HOSPITAL OUTPT CLINIC VISIT: HCPCS | Performed by: PHYSICIAN ASSISTANT

## 2023-10-24 PROCEDURE — 85610 PROTHROMBIN TIME: CPT | Performed by: EMERGENCY MEDICINE

## 2023-10-24 PROCEDURE — 76705 ECHO EXAM OF ABDOMEN: CPT

## 2023-10-24 PROCEDURE — 81002 URINALYSIS NONAUTO W/O SCOPE: CPT | Performed by: PHYSICIAN ASSISTANT

## 2023-10-24 PROCEDURE — 96374 THER/PROPH/DIAG INJ IV PUSH: CPT

## 2023-10-24 PROCEDURE — 81003 URINALYSIS AUTO W/O SCOPE: CPT | Performed by: EMERGENCY MEDICINE

## 2023-10-24 PROCEDURE — 99284 EMERGENCY DEPT VISIT MOD MDM: CPT

## 2023-10-24 RX ORDER — ACETAMINOPHEN 325 MG/1
975 TABLET ORAL ONCE
Status: COMPLETED | OUTPATIENT
Start: 2023-10-24 | End: 2023-10-24

## 2023-10-24 RX ORDER — KETOROLAC TROMETHAMINE 30 MG/ML
15 INJECTION, SOLUTION INTRAMUSCULAR; INTRAVENOUS ONCE
Status: COMPLETED | OUTPATIENT
Start: 2023-10-24 | End: 2023-10-24

## 2023-10-24 RX ORDER — POTASSIUM CHLORIDE 20 MEQ/1
40 TABLET, EXTENDED RELEASE ORAL ONCE
Status: DISCONTINUED | OUTPATIENT
Start: 2023-10-24 | End: 2023-10-24

## 2023-10-24 RX ADMIN — ACETAMINOPHEN 975 MG: 325 TABLET ORAL at 18:03

## 2023-10-24 RX ADMIN — IOHEXOL 100 ML: 350 INJECTION, SOLUTION INTRAVENOUS at 15:02

## 2023-10-24 RX ADMIN — SODIUM CHLORIDE 1000 ML: 0.9 INJECTION, SOLUTION INTRAVENOUS at 14:10

## 2023-10-24 RX ADMIN — KETOROLAC TROMETHAMINE 15 MG: 30 INJECTION, SOLUTION INTRAMUSCULAR; INTRAVENOUS at 18:03

## 2023-10-24 NOTE — ED PROVIDER NOTES
History  Chief Complaint   Patient presents with    Abdominal Pain     Started over the weekend. Denies N/V/d  RLQ     Patient is a 53-year-old female who presents from urgent care for evaluation of right upper quadrant abdominal pain. Patient says the symptoms have been intermittent over the past few days. She says that it feels like a sharp cramping pain in the right upper quadrant that radiates into the right flank. She denies any known alleviating or exacerbating factors. She denies any nausea or vomiting. She denies any lower abdominal pain. She denies any urinary symptoms, fevers, chills, loose stools. She denies any abdominal surgical history. Prior to Admission Medications   Prescriptions Last Dose Informant Patient Reported? Taking?    Calcium Carbonate-Vit D-Min (CALCIUM 1200 PO)   Yes No   Sig: Take by mouth   Cholecalciferol 50 MCG (2000 UT) CAPS  Self Yes No   Sig: Take by mouth daily   Multiple Vitamin (multivitamin) capsule   Yes No   Sig: Take 1 capsule by mouth daily   alendronate (FOSAMAX) 70 mg tablet   No No   Sig: TAKE 1 TABLET BY MOUTH EVERY 7 DAYS   atorvastatin (LIPITOR) 20 mg tablet   No No   Sig: TAKE ONE TABLET BY MOUTH EVERY DAY   fluticasone (FLONASE) 50 mcg/act nasal spray  Self Yes No   Si sprays into each nostril if needed   timolol (TIMOPTIC) 0.5 % ophthalmic solution  Self Yes No   Sig: Administer 1 drop to both eyes 2 (two) times a day      Facility-Administered Medications: None       Past Medical History:   Diagnosis Date    Depression     Hyperlipidemia     last assessed - 2016    Hyperparathyroidism ([address]) 2018       Past Surgical History:   Procedure Laterality Date     SECTION      NH PARATHYROIDECTOMY/EXPLORATION PARATHYROIDS Right 1/3/2023    Procedure: MINIMALLY INVASIVE RIGHT PARATHYROIDECTOMY;  Surgeon: Elver Man MD;  Location: [facility];  Service: Surgical Oncology    US GUIDED THYROID BIOPSY  2022       Family History Problem Relation Age of Onset    Cancer Mother     Cancer Father     Cancer Sister     Breast cancer Sister     No Known Problems Maternal Grandmother     No Known Problems Paternal Grandmother     No Known Problems Maternal Aunt     No Known Problems Maternal Aunt     No Known Problems Maternal Aunt     No Known Problems Paternal Aunt     No Known Problems Paternal Aunt      I have reviewed and agree with the history as documented. E-Cigarette/Vaping    E-Cigarette Use Never User      E-Cigarette/Vaping Substances    Nicotine No     THC No     CBD No     Flavoring No     Other No     Unknown No      Social History     Tobacco Use    Smoking status: Every Day     Packs/day: 0.50     Years: 30.00     Total pack years: 15.00     Types: Cigarettes    Smokeless tobacco: Never   Vaping Use    Vaping Use: Never used   Substance Use Topics    Alcohol use: No    Drug use: No       Review of Systems   Constitutional:  Negative for chills, diaphoresis and fever. HENT:  Negative for congestion, sinus pressure, sore throat and trouble swallowing. Eyes:  Negative for pain, discharge and itching. Respiratory:  Negative for cough, chest tightness, shortness of breath and wheezing. Cardiovascular:  Negative for chest pain, palpitations and leg swelling. Gastrointestinal:  Positive for abdominal pain (RUQ). Negative for abdominal distention, blood in stool, diarrhea, nausea and vomiting. Endocrine: Negative for polyphagia and polyuria. Genitourinary:  Negative for difficulty urinating, dysuria, flank pain, hematuria, pelvic pain and vaginal bleeding. Musculoskeletal:  Negative for arthralgias and back pain. Skin:  Negative for rash. Neurological:  Negative for dizziness, syncope, weakness, light-headedness and headaches. Physical Exam  Physical Exam  Vitals and nursing note reviewed. Constitutional:       General: She is not in acute distress. Appearance: She is well-developed.    HENT: Head: Normocephalic and atraumatic. Right Ear: External ear normal.      Left Ear: External ear normal.      Nose: Nose normal.      Mouth/Throat:      Mouth: Mucous membranes are moist.      Pharynx: No oropharyngeal exudate. Eyes:      Conjunctiva/sclera: Conjunctivae normal.      Pupils: Pupils are equal, round, and reactive to light. Cardiovascular:      Rate and Rhythm: Normal rate and regular rhythm. Heart sounds: Normal heart sounds. No murmur heard. No friction rub. No gallop. Pulmonary:      Effort: Pulmonary effort is normal. No respiratory distress. Breath sounds: Normal breath sounds. No wheezing or rales. Abdominal:      General: There is no distension. Palpations: Abdomen is soft. Tenderness: There is abdominal tenderness in the right upper quadrant. There is no right CVA tenderness, left CVA tenderness or guarding. Musculoskeletal:         General: No swelling, tenderness or deformity. Normal range of motion. Cervical back: Normal range of motion and neck supple. Lymphadenopathy:      Cervical: No cervical adenopathy. Skin:     General: Skin is warm and dry. Neurological:      General: No focal deficit present. Mental Status: She is alert and oriented to person, place, and time. Mental status is at baseline. Cranial Nerves: No cranial nerve deficit. Sensory: No sensory deficit. Motor: No weakness or abnormal muscle tone.       Coordination: Coordination normal.         Vital Signs  ED Triage Vitals [10/24/23 1338]   Temperature Pulse Respirations Blood Pressure SpO2   97.6 °F (36.4 °C) 71 18 141/79 98 %      Temp src Heart Rate Source Patient Position - Orthostatic VS BP Location FiO2 (%)   -- Monitor Sitting Left arm --      Pain Score       4           Vitals:    10/24/23 1338 10/24/23 1530 10/24/23 1630 10/24/23 1806   BP: 141/79 124/58 125/90 127/83   Pulse: 71 55 62 62   Patient Position - Orthostatic VS: Sitting Sitting Sitting Sitting         Visual Acuity      ED Medications  Medications   sodium chloride 0.9 % bolus 1,000 mL (0 mL Intravenous Stopped 10/24/23 1800)   iohexol (OMNIPAQUE) 350 MG/ML injection (MULTI-DOSE) 100 mL (100 mL Intravenous Given 10/24/23 1502)   ketorolac (TORADOL) injection 15 mg (15 mg Intravenous Given 10/24/23 1803)   acetaminophen (TYLENOL) tablet 975 mg (975 mg Oral Given 10/24/23 1803)       Diagnostic Studies  Results Reviewed       Procedure Component Value Units Date/Time    UA (URINE) with reflex to Scope [[id number]]  (Normal) Collected: 10/24/23 1450    Lab Status: Final result Specimen: Urine, Clean Catch Updated: 10/24/23 1500     Color, UA Yellow     Clarity, UA Clear     Specific Gravity, UA 1.010     pH, UA 7.0     Leukocytes, UA Negative     Nitrite, UA Negative     Protein, UA Negative mg/dl      Glucose, UA Negative mg/dl      Ketones, UA Negative mg/dl      Urobilinogen, UA 0.2 E.U./dl      Bilirubin, UA Negative     Occult Blood, UA Negative    Lipase [[id number]]  (Normal) Collected: 10/24/23 1411    Lab Status: Final result Specimen: Blood from Arm, Right Updated: 10/24/23 1443     Lipase 13 u/L     Comprehensive metabolic panel [[id number]]  (Abnormal) Collected: 10/24/23 1411    Lab Status: Final result Specimen: Blood from Arm, Right Updated: 10/24/23 1443     Sodium 140 mmol/L      Potassium 3.3 mmol/L      Chloride 109 mmol/L      CO2 24 mmol/L      ANION GAP 7 mmol/L      BUN 13 mg/dL      Creatinine 0.70 mg/dL      Glucose 83 mg/dL      Calcium 8.1 mg/dL      AST 14 U/L      ALT 14 U/L      Alkaline Phosphatase 42 U/L      Total Protein 6.2 g/dL      Albumin 3.7 g/dL      Total Bilirubin 0.40 mg/dL      eGFR 90 ml/min/1.73sq m     Narrative:      [place] guidelines for Chronic Kidney Disease (CKD):     Stage 1 with normal or high GFR (GFR > 90 mL/min/1.73 square meters)    Stage 2 Mild CKD (GFR = 60-89 mL/min/1.73 square meters)    Stage 3A Moderate CKD (GFR = 45-59 mL/min/1.73 square meters)    Stage 3B Moderate CKD (GFR = 30-44 mL/min/1.73 square meters)    Stage 4 Severe CKD (GFR = 15-29 mL/min/1.73 square meters)    Stage 5 End Stage CKD (GFR <15 mL/min/1.73 square meters)  Note: GFR calculation is accurate only with a steady state creatinine    Protime-INR [[id number]]  (Normal) Collected: 10/24/23 1411    Lab Status: Final result Specimen: Blood from Arm, Right Updated: 10/24/23 1438     Protime 13.4 seconds      INR 1.00    APTT [[id number]]  (Normal) Collected: 10/24/23 1411    Lab Status: Final result Specimen: Blood from Arm, Right Updated: 10/24/23 1438     PTT 27 seconds     CBC and differential [[id number]] Collected: 10/24/23 1411    Lab Status: Final result Specimen: Blood from Arm, Right Updated: 10/24/23 1424     WBC 4.98 Thousand/uL      RBC 4.69 Million/uL      Hemoglobin 14.7 g/dL      Hematocrit 44.2 %      MCV 94 fL      MCH 31.3 pg      MCHC 33.3 g/dL      RDW 12.8 %      MPV 10.8 fL      Platelets 775 Thousands/uL      nRBC 0 /100 WBCs      Neutrophils Relative 59 %      Immat GRANS % 0 %      Lymphocytes Relative 30 %      Monocytes Relative 9 %      Eosinophils Relative 2 %      Basophils Relative 0 %      Neutrophils Absolute 2.90 Thousands/µL      Immature Grans Absolute 0.01 Thousand/uL      Lymphocytes Absolute 1.49 Thousands/µL      Monocytes Absolute 0.44 Thousand/µL      Eosinophils Absolute 0.12 Thousand/µL      Basophils Absolute 0.02 Thousands/µL                    US right upper quadrant   Final Result by Chau Johns DO (10/24 9786)   Gallbladder partially contracted around large calcified stone or group of stones. Gallbladder wall top normal thickness which can be explained by incomplete distention. Negative sonographic Looney's sign. Normal caliber biliary ducts. No sonographic features of cholecystitis. Pancreatic tail obscured by bowel gas. Liver otherwise unremarkable. Kidney unremarkable.       Workstation performed: [id number]         CT abdomen pelvis with contrast   Final Result by Julianne Arauz MD (10/24 1527)      Cholelithiasis, no CT findings of acute cholecystitis. If concern for acute cholecystitis persists ultrasound can be considered      No biliary dilatation seen   No evidence of appendicitis   The study was marked in EPIC for significant notification. Workstation performed: [id number]                    Procedures  Procedures         ED Course                               SBIRT 20yo+      Flowsheet Row Most Recent Value   Initial Alcohol Screen: US AUDIT-C     1. How often do you have a drink containing alcohol? 0 Filed at: 10/24/2023 1338   2. How many drinks containing alcohol do you have on a typical day you are drinking? 0 Filed at: 10/24/2023 1338   3a. Male UNDER 65: How often do you have five or more drinks on one occasion? 0 Filed at: 10/24/2023 1338   3b. FEMALE Any Age, or MALE 65+: How often do you have 4 or more drinks on one occassion? 0 Filed at: 10/24/2023 1338   Audit-C Score 0 Filed at: 10/24/2023 1338   RAMU: How many times in the past year have you. .. Used an illegal drug or used a prescription medication for non-medical reasons? Never Filed at: 10/24/2023 1338                      Medical Decision Making  42-year-old female presenting for evaluation of right upper quadrant abdominal pain. Intermittent over the last few days. Radiates into right flank. No associated nausea, vomiting or fevers, chills. Not made worse with eating. Has some mild right upper quadrant tenderness on exam.  Differential includes cholecystitis, biliary colic, gastritis, colitis. Will obtain CBC, CMP, lipase. Will obtain CT abdomen pelvis with contrast.  Will give IV fluids. Patient declined pain medicine at this time. CBC, CMP within normal limits. CT abdomen pelvis shows cholelithiasis no signs of acute cholecystitis.   Will obtain right upper quadrant ultrasound  Ultrasound shows no findings of acute cholecystitis, does have cholelithiasis. Talk with patient about following up with outpatient general surgery. She is comfortable with this plan. Told to return to the ED if symptoms acutely worsen    Problems Addressed:  Biliary colic: acute illness or injury  Cholelithiasis: acute illness or injury    Amount and/or Complexity of Data Reviewed  Labs: ordered. Radiology: ordered. Risk  OTC drugs. Prescription drug management. Disposition  Final diagnoses:   Biliary colic   Cholelithiasis     Time reflects when diagnosis was documented in both MDM as applicable and the Disposition within this note       Time User Action Codes Description Comment    10/24/2023  5:50 PM Jaqueline Ingles [R51.9] Headache     10/24/2023  5:50 PM Poly Spikes Add [H53.8] Blurred vision     10/24/2023  5:50 PM Poly Spikes Add [R03.0] Elevated blood pressure reading     10/24/2023  5:52 PM Poly Spikes Modify [H53.8] Blurred vision     10/24/2023  5:52 PM Poly Spikes Remove [R51.9] Headache     10/24/2023  5:52 PM Poly Spikes Modify [R03.0] Elevated blood pressure reading     10/24/2023  5:52 PM Poly Spikes Remove [H53.8] Blurred vision     10/24/2023  5:52 PM Poly Spikes Remove [R03.0] Elevated blood pressure reading     10/24/2023  5:52 PM Poly Spikes Add [T25.03] Biliary colic     79/52/8767  8:99 PM Poly Spikes Add [K80.20] Cholelithiasis           ED Disposition       ED Disposition   Discharge    Condition   Stable    Date/Time   Tue Oct 24, 2023 1752    [place] should be discharged to home.                 MD Documentation      Tom Serra Most Recent Value   Patient Condition The patient has been stabilized such that within reasonable medical probability, no material deterioration of the patient condition or the condition of the unborn child(tracey) is likely to result from the transfer   Reason for Transfer Level of Care needed not available at this facility Benefits of Transfer Specialized equipment and/or services available at the receiving facility (Include comment)________________________   Risks of Transfer Potential for delay in receiving treatment, Potential deterioration of medical condition, Loss of IV, Increased discomfort during transfer   Accepting Physician Dr. Carlos Pretty Name, Alvin Egan   Provider Certification General risk, such as traffic hazards, adverse weather conditions, rough terrain or turbulence, possible failure of equipment (including vehicle or aircraft), or consequences of actions of persons outside the control of the transport personnel, Unanticipated needs of medical equipment and personnel during transport, Risk of worsening condition          RN Documentation      [address] Name, [address]       Follow up With Specialties Details Why Contact Info Additional Information    Antionette Ruiz MD General Surgery Schedule an appointment as soon as possible for a visit  For follow up of gall stones [id number] [facility] [address] Emergency Department Emergency Medicine Go to  If symptoms worsen [address] [facility] [address] [place] [address]  [address] Emergency Department, [address], [address]            Discharge Medication List as of 10/24/2023  5:54 PM        CONTINUE these medications which have NOT CHANGED    Details   alendronate (FOSAMAX) 70 mg tablet TAKE 1 TABLET BY MOUTH EVERY 7 DAYS, Normal      atorvastatin (LIPITOR) 20 mg tablet TAKE ONE TABLET BY MOUTH EVERY DAY, Normal      Calcium Carbonate-Vit D-Min (CALCIUM 1200 PO) Take by mouth, Historical Med      Cholecalciferol 50 MCG (2000 UT) CAPS Take by mouth daily, Historical Med fluticasone (FLONASE) 50 mcg/act nasal spray 2 sprays into each nostril if needed, Starting Mon 3/23/2015, Historical Med      Multiple Vitamin (multivitamin) capsule Take 1 capsule by mouth daily, Historical Med      timolol (TIMOPTIC) 0.5 % ophthalmic solution Administer 1 drop to both eyes 2 (two) times a day, Starting u 10/25/2018, Historical Med             No discharge procedures on file.     PDMP Review         Value Time User    PDMP Reviewed  Yes 9/19/2022  9:42 AM Jaxson Coyle MD            ED Provider  Electronically Signed by             Junaid De La Fuente DO  10/24/23 7034

## 2023-10-24 NOTE — DISCHARGE INSTRUCTIONS
It is important that you follow up with general surgery in regards to the following CT findings:    Gallbladder partially contracted around large calcified stone or group of stones. Gallbladder wall top normal thickness which can be explained by incomplete distention. Negative sonographic Looney's sign. Normal caliber biliary ducts. No sonographic features of cholecystitis.

## 2023-10-24 NOTE — EMTALA/ACUTE CARE TRANSFER
[address] [address]  Dept: [phone]      EMTALA TRANSFER CONSENT    NAME Ralph Major                                         1955                              MRN [id number]    I have been informed of my rights regarding examination, treatment, and transfer   by Dr. Meeta Castañeda DO    Benefits: Specialized equipment and/or services available at the receiving facility (Include comment)________________________    Risks: Potential for delay in receiving treatment, Potential deterioration of medical condition, Loss of IV, Increased discomfort during transfer      Consent for Transfer:  I acknowledge that my medical condition has been evaluated and explained to me by the emergency department physician or other qualified medical person and/or my attending physician, who has recommended that I be transferred to the service of  Accepting Physician: Dr. Carmella Bauer at [address] Name, [facility] : [facility]. The above potential benefits of such transfer, the potential risks associated with such transfer, and the probable risks of not being transferred have been explained to me, and I fully understand them. The doctor has explained that, in my case, the benefits of transfer outweigh the risks. I agree to be transferred. I authorize the performance of emergency medical procedures and treatments upon me in both transit and upon arrival at the receiving facility. Additionally, I authorize the release of any and all medical records to the receiving facility and request they be transported with me, if possible. I understand that the safest mode of transportation during a medical emergency is an ambulance and that the Hospital advocates the use of this mode of transport.  Risks of traveling to the receiving facility by car, including absence of medical control, life sustaining equipment, such as oxygen, and medical personnel has been explained to me and I fully understand them. (KAYLAH CORRECT BOX BELOW)  [  ]  I consent to the stated transfer and to be transported by ambulance/helicopter. [  ]  I consent to the stated transfer, but refuse transportation by ambulance and accept full responsibility for my transportation by car. I understand the risks of non-ambulance transfers and I exonerate the Hospital and its staff from any deterioration in my condition that results from this refusal.    X___________________________________________    DATE  10/24/23  TIME________  Signature of patient or legally responsible individual signing on patient behalf           RELATIONSHIP TO PATIENT_________________________          Provider Certification    NAME Gricelda Major                                         1955                              MRN [id number]    A medical screening exam was performed on the above named patient. Based on the examination:    Condition Necessitating Transfer The primary encounter diagnosis was Headache. Diagnoses of Blurred vision and Elevated blood pressure reading were also pertinent to this visit.     Patient Condition: The patient has been stabilized such that within reasonable medical probability, no material deterioration of the patient condition or the condition of the unborn child(tracey) is likely to result from the transfer    Reason for Transfer: Level of Care needed not available at this facility    Transfer Requirements: [id number] [facility] available and qualified personnel available for treatment as acknowledged by    Agreed to accept transfer and to provide appropriate medical treatment as acknowledged by       Dr. Brad Courtney  Appropriate medical records of the examination and treatment of the patient are provided at the time of transfer   [address] _______  Transfer will be performed by qualified personnel from    and appropriate transfer equipment as required, including the use of necessary and appropriate life support measures. Provider Certification: I have examined the patient and explained the following risks and benefits of being transferred/refusing transfer to the patient/family:  General risk, such as traffic hazards, adverse weather conditions, rough terrain or turbulence, possible failure of equipment (including vehicle or aircraft), or consequences of actions of persons outside the control of the transport personnel, Unanticipated needs of medical equipment and personnel during transport, Risk of worsening condition      Based on these reasonable risks and benefits to the patient and/or the unborn child(tracey), and based upon the information available at the time of the patient’s examination, I certify that the medical benefits reasonably to be expected from the provision of appropriate medical treatments at another medical facility outweigh the increasing risks, if any, to the individual’s medical condition, and in the case of labor to the unborn child, from effecting the transfer.     X____________________________________________ DATE 10/24/23        TIME_______      ORIGINAL - SEND TO MEDICAL RECORDS   COPY - SEND WITH PATIENT DURING TRANSFER

## 2023-10-24 NOTE — PROGRESS NOTES
[facility]        NAME: Serge Anderson is a 79 y.o. female  : 1955    MRN: [id number]  DATE: 2023  TIME: 1:14 PM    Assessment and Plan   Right upper quadrant abdominal pain [R10.11]  1. Right upper quadrant abdominal pain  POCT urine dip    Transfer to other facility      2. Right sided abdominal pain  Transfer to other facility        Urine dip-yellow and clear, negative blood, negative , negative leukocytes  Right sided abdominal pain since this weekend. Sometime sharp and severe, sometimes achy. Has tried tylenol with some improvement. Radiates to flank and RLQ. Denies n/v/d, fevers or chills  Attmepted to call ER at 1:12pm- no answer  Attempted to call ER at 1:19pm no answer  Deon texted the [facility] notifications and was given a different number to try   Spoke with NP about patient    Patient Instructions     Patient would like to go via private vehicle to the [facility] Emergency Department for further evaluation, workup and treatment- hospital address verified with the patient. Patient agreed to go immediately to the ED. Chief Complaint     Chief Complaint   Patient presents with    Abdominal Pain     Patient c/o right upper quadrant pain that started over the weekend. Patient reports this is intermittent sharp pain. History of Present Illness       51-year-old female presents for evaluation of right-sided abdominal pain over the past few days. Notes the pain is mostly to the right upper abdomen. Started over the weekend. She denies any injury to the area. States sometimes the pain is sharp and stabbing, sometimes dull and achy. States sometimes worse after eating sometimes seem related. States the pain sometimes he gets to the right flank, some down to the right lower stomach. Has tried Tylenol with improvement. She denies any fevers or chills. Denies any bad foods or new restaurants.   Denies any nausea, vomiting, diarrhea, chest pain, tightness, shortness of breath, urinary symptoms or other complaints. Denies history of abdominal surgeries. Denies any history of gallbladder issues. Review of Systems   Review of Systems   Constitutional:  Negative for activity change, appetite change, chills, fatigue and fever. Respiratory:  Negative for shortness of breath. Cardiovascular:  Negative for chest pain. Gastrointestinal:  Positive for abdominal pain. Negative for diarrhea, nausea and vomiting. Genitourinary:  Negative for decreased urine volume, difficulty urinating, dysuria, flank pain, frequency, genital sores, hematuria, pelvic pain, urgency, vaginal bleeding, vaginal discharge and vaginal pain. Musculoskeletal:  Negative for back pain and myalgias. Neurological:  Negative for dizziness, syncope, weakness and light-headedness. All other systems reviewed and are negative.         Current Medications       Current Outpatient Medications:     alendronate (FOSAMAX) 70 mg tablet, TAKE 1 TABLET BY MOUTH EVERY 7 DAYS, Disp: 12 tablet, Rfl: 2    atorvastatin (LIPITOR) 20 mg tablet, TAKE ONE TABLET BY MOUTH EVERY DAY, Disp: 90 tablet, Rfl: 3    Calcium Carbonate-Vit D-Min (CALCIUM 1200 PO), Take by mouth, Disp: , Rfl:     Cholecalciferol 50 MCG (2000 UT) CAPS, Take by mouth daily, Disp: , Rfl:     fluticasone (FLONASE) 50 mcg/act nasal spray, 2 sprays into each nostril if needed, Disp: , Rfl:     Multiple Vitamin (multivitamin) capsule, Take 1 capsule by mouth daily, Disp: , Rfl:     timolol (TIMOPTIC) 0.5 % ophthalmic solution, Administer 1 drop to both eyes 2 (two) times a day, Disp: , Rfl:     Current Allergies     Allergies as of 10/24/2023 - Reviewed 10/24/2023   Allergen Reaction Noted    Sulfa antibiotics Hives 08/21/2014            The following portions of the patient's history were reviewed and updated as appropriate: allergies, current medications, past family history, past medical history, past social history, past surgical history and problem list.     Past Medical History:   Diagnosis Date    Depression     Hyperlipidemia     last assessed - 2016    Hyperparathyroidism ([address]) 2018       Past Surgical History:   Procedure Laterality Date     SECTION      VT PARATHYROIDECTOMY/EXPLORATION PARATHYROIDS Right 1/3/2023    Procedure: MINIMALLY INVASIVE RIGHT PARATHYROIDECTOMY;  Surgeon: Aldair Castrejon MD;  Location: [facility];  Service: Surgical Oncology    US GUIDED THYROID BIOPSY  2022       Family History   Problem Relation Age of Onset    Cancer Mother     Cancer Father     Cancer Sister     Breast cancer Sister     No Known Problems Maternal Grandmother     No Known Problems Paternal Grandmother     No Known Problems Maternal Aunt     No Known Problems Maternal Aunt     No Known Problems Maternal Aunt     No Known Problems Paternal Aunt     No Known Problems Paternal Aunt          Medications have been verified. Objective   /84   Pulse 60   Temp 98.4 °F (36.9 °C) (Temporal)   Resp 18   Ht 5' 3" (1.6 m)   Wt 72.6 kg (160 lb)   SpO2 99%   BMI 28.34 kg/m²        Physical Exam     Physical Exam  Vitals and nursing note reviewed. Constitutional:       General: She is not in acute distress. Appearance: She is well-developed. She is not ill-appearing or toxic-appearing. HENT:      Head: Normocephalic and atraumatic. Mouth/Throat:      Mouth: Mucous membranes are moist.   Eyes:      Pupils: Pupils are equal, round, and reactive to light. Cardiovascular:      Rate and Rhythm: Normal rate and regular rhythm. Heart sounds: Normal heart sounds. Pulmonary:      Effort: Pulmonary effort is normal.      Breath sounds: Normal breath sounds. No wheezing. Abdominal:      General: Bowel sounds are normal.      Palpations: Abdomen is soft. Tenderness: There is abdominal tenderness in the right upper quadrant.  There is no right CVA tenderness, left CVA tenderness, guarding or rebound. Skin:     Capillary Refill: Capillary refill takes less than 2 seconds. Neurological:      Mental Status: She is alert and oriented to person, place, and time.    Psychiatric:         Mood and Affect: Mood normal.         Behavior: Behavior normal.

## 2023-10-24 NOTE — EMTALA/ACUTE CARE TRANSFER
[id number] [facility]  [id number]  [address] [facility] [address]  Dept: [phone]      EMTALA TRANSFER CONSENT    NAME Catherine Major                                         1955                              MRN [id number]    I have been informed of my rights regarding examination, treatment, and transfer   by Dr. Xenia Cedeño DO    Benefits:      Risks:        Consent for Transfer:  I acknowledge that my medical condition has been evaluated and explained to me by the emergency department physician or other qualified medical person and/or my attending physician, who has recommended that I be transferred to the service of    at  . The above potential benefits of such transfer, the potential risks associated with such transfer, and the probable risks of not being transferred have been explained to me, and I fully understand them. The doctor has explained that, in my case, the benefits of transfer outweigh the risks. I agree to be transferred. I authorize the performance of emergency medical procedures and treatments upon me in both transit and upon arrival at the receiving facility. Additionally, I authorize the release of any and all medical records to the receiving facility and request they be transported with me, if possible. I understand that the safest mode of transportation during a medical emergency is an ambulance and that the Hospital advocates the use of this mode of transport. Risks of traveling to the receiving facility by car, including absence of medical control, life sustaining equipment, such as oxygen, and medical personnel has been explained to me and I fully understand them. (KAYLAH CORRECT BOX BELOW)  [  ]  I consent to the stated transfer and to be transported by ambulance/helicopter. [  ]  I consent to the stated transfer, but refuse transportation by ambulance and accept full responsibility for my transportation by car.   I understand the risks of non-ambulance transfers and I exonerate the Hospital and its staff from any deterioration in my condition that results from this refusal.    X___________________________________________    DATE  10/24/23  TIME________  Signature of patient or legally responsible individual signing on patient behalf           RELATIONSHIP TO PATIENT_________________________          Provider Certification    NAME Rajat Major                                         1955                              MRN [id number]    A medical screening exam was performed on the above named patient. Based on the examination:    Condition Necessitating Transfer The primary encounter diagnosis was Headache. Diagnoses of Blurred vision and Elevated blood pressure reading were also pertinent to this visit. Patient Condition:      Reason for Transfer:      Transfer Requirements: [address] available and qualified personnel available for treatment as acknowledged by    Agreed to accept transfer and to provide appropriate medical treatment as acknowledged by          Appropriate medical records of the examination and treatment of the patient are provided at the time of transfer   [address] _______  Transfer will be performed by qualified personnel from    and appropriate transfer equipment as required, including the use of necessary and appropriate life support measures.     Provider Certification: I have examined the patient and explained the following risks and benefits of being transferred/refusing transfer to the patient/family:         Based on these reasonable risks and benefits to the patient and/or the unborn child(trcaey), and based upon the information available at the time of the patient’s examination, I certify that the medical benefits reasonably to be expected from the provision of appropriate medical treatments at another medical facility outweigh the increasing risks, if any, to the individual’s medical condition, and in the case of labor to the unborn child, from effecting the transfer.     X____________________________________________ DATE 10/24/23        TIME_______      ORIGINAL - SEND TO MEDICAL RECORDS   COPY - SEND WITH PATIENT DURING TRANSFER

## 2023-10-24 NOTE — PATIENT INSTRUCTIONS
Patient would like to go via private vehicle to the [facility] Emergency Department for further evaluation, workup and treatment- hospital address verified with the patient. Patient agreed to go immediately to the ED.

## 2023-10-26 ENCOUNTER — NURSE TRIAGE (OUTPATIENT)
Dept: OTHER | Facility: OTHER | Age: 68
End: 2023-10-26

## 2023-10-27 NOTE — TELEPHONE ENCOUNTER
Reason for Disposition  • Mild widespread rash    Answer Assessment - Initial Assessment Questions  1. APPEARANCE of RASH: "Describe the rash." (e.g., spots, blisters, raised areas, skin peeling, scaly)      It is a red rash , blotchy  2. SIZE: "How big are the spots?" (e.g., tip of pen, eraser, coin; inches, centimeters)      5 blotches on her back dime, nickel, and half dollar size   3. LOCATION: "Where is the rash located?"      Side and lower back  4. COLOR: "What color is the rash?" (Note: It is difficult to assess rash color in people with darker-colored skin. When this situation occurs, simply ask the caller to describe what they see.)      red  5. ONSET: "When did the rash begin?"      Rash started yesterday after taking the otc medicine for one day. 6. FEVER: "Do you have a fever?" If Yes, ask: "What is your temperature, how was it measured, and when did it start?"      none  7. ITCHING: "Does the rash itch?" If Yes, ask: "How bad is the itch?" (Scale 1-10; or mild, moderate, severe)      none  8. CAUSE: "What do you think is causing the rash?"      unsure  9. MEDICATION FACTORS: "Have you started any new medications within the last 2 weeks?" (e.g., antibiotics)       She has taken Tylenol before. She has taken ibuprofen at least once before. 10. OTHER SYMPTOMS: "Do you have any other symptoms?" (e.g., dizziness, headache, sore throat, joint pain)        Mild discomfort of the rash. No issues with breathing or swallowing.     Protocols used: Rash or Redness - Widespread-ADULT-

## 2023-10-27 NOTE — TELEPHONE ENCOUNTER
Pt has a mild blotchy red rash  on two areas of the body ( abdomin / stomach) after taking  Tylenol and ibuprofen. She would like a call back in the morning regarding her possible pain medication alternatives. She thinks it could be the ibuprofen due to she takes tylenol often with no issue. Pt has no issue breathing or /swallowing, and denies any new detergents.

## 2023-10-27 NOTE — TELEPHONE ENCOUNTER
Regarding: Rash  ----- Message from Yuni Whitman sent at 10/26/2023  9:54 PM EDT -----  " I was put on Tylenol and Ibuprofen for gallstones.  Now I have a rash on my lower back and stomach."

## 2023-10-27 NOTE — TELEPHONE ENCOUNTER
Please let her know to stop the ibuprofen and just take Tylenol as needed for pain.   If the rash does not resolve she should be evaluated

## 2023-10-30 ENCOUNTER — OFFICE VISIT (OUTPATIENT)
Dept: URGENT CARE | Facility: CLINIC | Age: 68
End: 2023-10-30
Payer: MEDICARE

## 2023-10-30 ENCOUNTER — NURSE TRIAGE (OUTPATIENT)
Dept: OTHER | Facility: OTHER | Age: 68
End: 2023-10-30

## 2023-10-30 VITALS
OXYGEN SATURATION: 97 % | BODY MASS INDEX: 28.35 KG/M2 | SYSTOLIC BLOOD PRESSURE: 143 MMHG | TEMPERATURE: 98 F | WEIGHT: 160 LBS | HEART RATE: 71 BPM | HEIGHT: 63 IN | RESPIRATION RATE: 16 BRPM | DIASTOLIC BLOOD PRESSURE: 84 MMHG

## 2023-10-30 DIAGNOSIS — B02.9 HERPES ZOSTER WITHOUT COMPLICATION: Primary | ICD-10-CM

## 2023-10-30 PROCEDURE — 99213 OFFICE O/P EST LOW 20 MIN: CPT | Performed by: PHYSICIAN ASSISTANT

## 2023-10-30 PROCEDURE — G0463 HOSPITAL OUTPT CLINIC VISIT: HCPCS | Performed by: PHYSICIAN ASSISTANT

## 2023-10-30 RX ORDER — VALACYCLOVIR HYDROCHLORIDE 1 G/1
1000 TABLET, FILM COATED ORAL 3 TIMES DAILY
Qty: 21 TABLET | Refills: 0 | Status: SHIPPED | OUTPATIENT
Start: 2023-10-30 | End: 2023-11-06

## 2023-10-30 NOTE — PROGRESS NOTES
[facility]        NAME: Rickie Persaud is a 79 y.o. female  : 1955    MRN: [id number]  DATE: 2023  TIME: 7:52 PM    Assessment and Plan   Herpes zoster without complication [W56.6]  1. Herpes zoster without complication  valACYclovir (VALTREX) 1,000 mg tablet            Patient Instructions     Patient Instructions   Take Valtrex as instructed for the next 7 days. Provided information on shingles and aftercare. All patient's questions answered and is agreeable with this plan. Follow up with PCP in 3-5 days. Proceed to  ER if symptoms worsen. Chief Complaint     Chief Complaint   Patient presents with    Rash     Rash to her abdomen and around to the back midline         History of Present Illness       Patient is a 70-year-old female presenting today with rash x3 days. Patient notes over the last few days she has had a worsening rash of her abdomen and back, notes the rash is very painful and tender to touch, has been applying cortisone cream to the area but notes no change or improvement of her symptoms. Denies fever, chills, N/V/D. Does note she has been under an increased amount of stress over the last few days. Review of Systems   Review of Systems   Constitutional:  Negative for chills and fever. HENT:  Negative for ear pain and sore throat. Eyes:  Negative for pain and visual disturbance. Respiratory:  Negative for cough and shortness of breath. Cardiovascular:  Negative for chest pain and palpitations. Gastrointestinal:  Negative for abdominal pain and vomiting. Genitourinary:  Negative for dysuria and hematuria. Musculoskeletal:  Negative for arthralgias and back pain. Skin:  Positive for rash. Neurological:  Negative for seizures and syncope. All other systems reviewed and are negative.         Current Medications       Current Outpatient Medications:     alendronate (FOSAMAX) 70 mg tablet, TAKE 1 TABLET BY MOUTH EVERY 7 DAYS, Disp: 12 tablet, Rfl: 2    atorvastatin (LIPITOR) 20 mg tablet, TAKE ONE TABLET BY MOUTH EVERY DAY, Disp: 90 tablet, Rfl: 3    Calcium Carbonate-Vit D-Min (CALCIUM 1200 PO), Take by mouth, Disp: , Rfl:     Cholecalciferol 50 MCG (2000 UT) CAPS, Take by mouth daily, Disp: , Rfl:     fluticasone (FLONASE) 50 mcg/act nasal spray, 2 sprays into each nostril if needed, Disp: , Rfl:     Multiple Vitamin (multivitamin) capsule, Take 1 capsule by mouth daily, Disp: , Rfl:     timolol (TIMOPTIC) 0.5 % ophthalmic solution, Administer 1 drop to both eyes 2 (two) times a day, Disp: , Rfl:     valACYclovir (VALTREX) 1,000 mg tablet, Take 1 tablet (1,000 mg total) by mouth 3 (three) times a day for 7 days, Disp: 21 tablet, Rfl: 0    Current Allergies     Allergies as of 10/30/2023 - Reviewed 10/30/2023   Allergen Reaction Noted    Sulfa antibiotics Hives 2014            The following portions of the patient's history were reviewed and updated as appropriate: allergies, current medications, past family history, past medical history, past social history, past surgical history and problem list.     Past Medical History:   Diagnosis Date    Depression     Hyperlipidemia     last assessed - 2016    Hyperparathyroidism ([address]) 2018       Past Surgical History:   Procedure Laterality Date     SECTION      [place] PARATHYROIDECTOMY/EXPLORATION PARATHYROIDS Right 1/3/2023    Procedure: MINIMALLY INVASIVE RIGHT PARATHYROIDECTOMY;  Surgeon: Seven Arrington MD;  Location: [facility];  Service: Surgical Oncology    US GUIDED THYROID BIOPSY  2022       Family History   Problem Relation Age of Onset    Cancer Mother     Cancer Father     Cancer Sister     Breast cancer Sister     No Known Problems Maternal Grandmother     No Known Problems Paternal Grandmother     No Known Problems Maternal Aunt     No Known Problems Maternal Aunt     No Known Problems Maternal Aunt     No Known Problems Paternal Aunt     No Known Problems Paternal Aunt          Medications have been verified. Objective   /84   Pulse 71   Temp 98 °F (36.7 °C)   Resp 16   Ht 5' 3" (1.6 m)   Wt 72.6 kg (160 lb)   SpO2 97%   BMI 28.34 kg/m²        Physical Exam     Physical Exam  Vitals and nursing note reviewed. Constitutional:       General: She is not in acute distress. Appearance: Normal appearance. HENT:      Head: Normocephalic. Right Ear: Tympanic membrane, ear canal and external ear normal.      Left Ear: Tympanic membrane, ear canal and external ear normal.      Mouth/Throat:      Mouth: Mucous membranes are moist.      Pharynx: Oropharynx is clear. Eyes:      Conjunctiva/sclera: Conjunctivae normal.   Cardiovascular:      Rate and Rhythm: Normal rate. Pulses: Normal pulses. Pulmonary:      Effort: Pulmonary effort is normal.   Skin:     Findings: Rash present. Comments: Red vesicular rash of right side of lower abdomen and right lower back in line with dermatome T12, presentation consistent with shingles/herpes zoster, no signs of infection   Neurological:      Mental Status: She is alert.

## 2023-10-30 NOTE — TELEPHONE ENCOUNTER
Patient reports having a rash on her mid back and stomach for several days. Notes the rash is now very painful and has turned to blisters. Due to limited office availability for appointment patient decided to be seen at urgent care this evening.

## 2023-10-30 NOTE — TELEPHONE ENCOUNTER
Regarding: rash/ worse sx  ----- Message from Chetan Crowe sent at 10/30/2023  6:52 PM EDT -----  Pt called, " I called earlier regarding a rash. I was recommended to stop taking the ibuprofen. I stopped taking the ibuprofen but the rash has gotten worse.  It looks like blisters now."

## 2023-10-30 NOTE — PATIENT INSTRUCTIONS
Take Valtrex as instructed for the next 7 days. Provided information on shingles and aftercare. All patient's questions answered and is agreeable with this plan.

## 2023-10-30 NOTE — TELEPHONE ENCOUNTER
Reason for Disposition  • Patient sounds very sick or weak to the triager    Answer Assessment - Initial Assessment Questions  1. MAIN CONCERN OR SYMPTOM:  "What is your main concern right now?" "What question do you have?" "What's the main symptom you're worried about?" (e.g., breathing difficulty, cough, fever. pain)      Rash on mid back and stomach has gotten worse  2. ONSET: "When did the  rash start?"      Several days ago  3. BETTER-SAME-WORSE: "Are you getting better, staying the same, or getting worse compared to how you felt at your last visit to the doctor (most recent medical visit)? "      worse  4. VISIT DATE: "When were you seen?" (Date)      Called in for advice on 10/26, and was advised to have rash evaluated if it did not resolve  9. PAIN: "Is there any pain?" If Yes, ask: "How bad is it?"  (Scale 1-10; or mild, moderate, severe)      Rates pain 8/10  10. FEVER: "Do you have a fever?" If Yes, ask: "What is it, how was it measured  and when did it start?"        denies  11. OTHER SYMPTOMS: "Do you have any other symptoms?"        Denies CP or SOB.  Notes the rash has now turned to blisters    Protocols used: Recent Medical Visit for Illness Follow-up Call-ADULT-AH

## 2023-11-15 ENCOUNTER — TELEPHONE (OUTPATIENT)
Dept: FAMILY MEDICINE CLINIC | Facility: CLINIC | Age: 68
End: 2023-11-15

## 2023-11-15 ENCOUNTER — CONSULT (OUTPATIENT)
Dept: SURGERY | Facility: CLINIC | Age: 68
End: 2023-11-15
Payer: MEDICARE

## 2023-11-15 VITALS
BODY MASS INDEX: 28.34 KG/M2 | DIASTOLIC BLOOD PRESSURE: 90 MMHG | HEART RATE: 55 BPM | OXYGEN SATURATION: 98 % | RESPIRATION RATE: 18 BRPM | SYSTOLIC BLOOD PRESSURE: 160 MMHG | WEIGHT: 160 LBS | TEMPERATURE: 98.9 F

## 2023-11-15 DIAGNOSIS — K80.10 CALCULUS OF GALLBLADDER WITH CHRONIC CHOLECYSTITIS WITHOUT OBSTRUCTION: Primary | ICD-10-CM

## 2023-11-15 DIAGNOSIS — B02.29 POST HERPETIC NEURALGIA: Primary | ICD-10-CM

## 2023-11-15 PROCEDURE — 99203 OFFICE O/P NEW LOW 30 MIN: CPT | Performed by: SURGERY

## 2023-11-15 RX ORDER — GABAPENTIN 100 MG/1
100 CAPSULE ORAL 3 TIMES DAILY
Qty: 30 CAPSULE | Refills: 1 | Status: SHIPPED | OUTPATIENT
Start: 2023-11-15

## 2023-11-15 NOTE — PROGRESS NOTES
Assessment/Plan:    Calculus of gallbladder with chronic cholecystitis without obstruction  Patient is a pleasant 28-year-old female with a past medical history significant for hyperparathyroidism presenting with a 1 month history of right upper quadrant abdominal pain and ultrasound and CT findings diagnostic of cholelithiasis. A week following her presentation to the hospital in late October she developed a rash and was diagnosed with shingles. She has had persistent right upper quadrant abdominal pain in the distribution of the dermatome. On physical examination today she is well-appearing. Enzo competent reliable as a historian. She has no tenderness to percussion or palpation in 4 quadrants. No guarding rebound peritoneal signs. The skin eruptions and distribution of the thoracic dermatome in the right upper quadrant overlying the gallbladder is visible. Patient has 1 month history of right upper quadrant abdominal pain. She has 2 equally plausible etiologies to her symptom complex these include both herpes zoster and chronic calculus cholecystitis. We discussed various options including close follow-up in a month's time with reexamination versus moving forward with laparoscopic cholecystectomy for the definitive treatment of her gallstone disease. The merits of each approach reviewed. Together we decided to follow-up in a month's time which I believe to be the most appropriate approach in this early phase of her resolving zoster infection. I would forward seeing her back in a month's time. She has been encouraged to follow-up to practice sooner on an as-needed basis. Diagnoses and all orders for this visit:    Calculus of gallbladder with chronic cholecystitis without obstruction          Subjective:      Patient ID: Kentrell Thomas is a 79 y.o. female. Patient is coming in the office for Biliary colic.  She states that she has been having a lot of abdominal pain but no fever/chills. TRANG Tripp            Review of Systems   Constitutional:  Negative for chills and fever. HENT:  Negative for ear pain and sore throat. Eyes:  Negative for pain and visual disturbance. Respiratory:  Negative for cough and shortness of breath. Cardiovascular:  Negative for chest pain and palpitations. Gastrointestinal:  Negative for abdominal pain and vomiting. Genitourinary:  Negative for dysuria and hematuria. Musculoskeletal:  Negative for arthralgias and back pain. Skin:  Negative for color change and rash. Neurological:  Negative for seizures and syncope. All other systems reviewed and are negative. Objective:      /90 (BP Location: Right arm, Patient Position: Sitting, Cuff Size: Standard)   Pulse 55   Temp 98.9 °F (37.2 °C) (Temporal)   Resp 18   Wt 72.6 kg (160 lb)   SpO2 98%   BMI 28.34 kg/m²          Physical Exam  Constitutional:       Appearance: She is well-developed. HENT:      Head: Normocephalic and atraumatic. Eyes:      Conjunctiva/sclera: Conjunctivae normal.      Pupils: Pupils are equal, round, and reactive to light. Cardiovascular:      Rate and Rhythm: Normal rate and regular rhythm. Pulmonary:      Effort: Pulmonary effort is normal.      Breath sounds: Normal breath sounds. Abdominal:      General: Bowel sounds are normal.      Palpations: Abdomen is soft. Comments: Physical examination as described above   Musculoskeletal:         General: Normal range of motion. Cervical back: Normal range of motion and neck supple. Skin:     General: Skin is warm and dry. Neurological:      Mental Status: She is alert and oriented to person, place, and time. Psychiatric:         Behavior: Behavior normal.         Thought Content:  Thought content normal.         Judgment: Judgment normal.

## 2023-11-15 NOTE — ASSESSMENT & PLAN NOTE
Patient is a pleasant 16-year-old female with a past medical history significant for hyperparathyroidism presenting with a 1 month history of right upper quadrant abdominal pain and ultrasound and CT findings diagnostic of cholelithiasis. A week following her presentation to the hospital in late October she developed a rash and was diagnosed with shingles. She has had persistent right upper quadrant abdominal pain in the distribution of the dermatome. On physical examination today she is well-appearing. Enzo competent reliable as a historian. She has no tenderness to percussion or palpation in 4 quadrants. No guarding rebound peritoneal signs. The skin eruptions and distribution of the thoracic dermatome in the right upper quadrant overlying the gallbladder is visible. Patient has 1 month history of right upper quadrant abdominal pain. She has 2 equally plausible etiologies to her symptom complex these include both herpes zoster and chronic calculus cholecystitis. We discussed various options including close follow-up in a month's time with reexamination versus moving forward with laparoscopic cholecystectomy for the definitive treatment of her gallstone disease. The merits of each approach reviewed. Together we decided to follow-up in a month's time which I believe to be the most appropriate approach in this early phase of her resolving zoster infection. I would forward seeing her back in a month's time. She has been encouraged to follow-up to practice sooner on an as-needed basis.

## 2023-11-15 NOTE — TELEPHONE ENCOUNTER
Can try Gabapentin 100 mg TID. Can sometimes cause drowsiness, dizziness.  Have her try at bedtime first

## 2023-11-15 NOTE — TELEPHONE ENCOUNTER
Patient called office stating that she was dx with shingles by urgent care 10/30 - she is still having postherpatic pain and wondering if there is anything else besides tylenol that she can take?

## 2023-11-29 ENCOUNTER — HOSPITAL ENCOUNTER (OUTPATIENT)
Dept: CT IMAGING | Facility: HOSPITAL | Age: 68
Discharge: HOME | End: 2023-11-29
Payer: MEDICARE

## 2023-11-29 DIAGNOSIS — F17.210 CIGARETTE NICOTINE DEPENDENCE WITHOUT COMPLICATION: ICD-10-CM

## 2023-11-29 PROCEDURE — 71271 CT THORAX LUNG CANCER SCR C-: CPT

## 2023-12-20 ENCOUNTER — HOSPITAL ENCOUNTER (OUTPATIENT)
Dept: ULTRASOUND IMAGING | Facility: HOSPITAL | Age: 68
Discharge: HOME | End: 2023-12-20
Payer: MEDICARE

## 2023-12-20 ENCOUNTER — HOSPITAL ENCOUNTER (OUTPATIENT)
Dept: MAMMOGRAPHY | Facility: HOSPITAL | Age: 68
Discharge: HOME | End: 2023-12-20
Payer: MEDICARE

## 2023-12-20 ENCOUNTER — HOSPITAL ENCOUNTER (OUTPATIENT)
Dept: BONE DENSITY | Facility: HOSPITAL | Age: 68
Discharge: HOME | End: 2023-12-20
Payer: MEDICARE

## 2023-12-20 VITALS — BODY MASS INDEX: 28.35 KG/M2 | HEIGHT: 63 IN | WEIGHT: 160 LBS

## 2023-12-20 VITALS — BODY MASS INDEX: 27.64 KG/M2 | HEIGHT: 63 IN | WEIGHT: 156 LBS

## 2023-12-20 DIAGNOSIS — E04.1 THYROID NODULE: ICD-10-CM

## 2023-12-20 DIAGNOSIS — M81.8 OTHER OSTEOPOROSIS WITHOUT CURRENT PATHOLOGICAL FRACTURE: ICD-10-CM

## 2023-12-20 DIAGNOSIS — Z12.31 ENCOUNTER FOR SCREENING MAMMOGRAM FOR BREAST CANCER: ICD-10-CM

## 2023-12-20 PROCEDURE — 77067 SCR MAMMO BI INCL CAD: CPT

## 2023-12-20 PROCEDURE — 77080 DXA BONE DENSITY AXIAL: CPT

## 2023-12-20 PROCEDURE — 77063 BREAST TOMOSYNTHESIS BI: CPT

## 2023-12-20 PROCEDURE — 76536 US EXAM OF HEAD AND NECK: CPT

## 2023-12-27 ENCOUNTER — TELEPHONE (OUTPATIENT)
Dept: ENDOCRINOLOGY | Facility: CLINIC | Age: 68
End: 2023-12-27

## 2023-12-27 DIAGNOSIS — E04.2 MULTIPLE THYROID NODULES: Primary | ICD-10-CM

## 2023-12-27 NOTE — TELEPHONE ENCOUNTER
Called patient, unavailable left message with son to call the office for US results, nothing urgent.       Message from JAVON Albert sent at 12/27/2023 11:20 AM EST -----  Thyroid US reviewed. No nodule meets criteria for biopsy but a repeat US in 1 year is recommended. Order placed.

## 2024-01-22 NOTE — PROGRESS NOTES
Assessment/Plan:    Calculus of gallbladder with chronic cholecystitis without obstruction  Patient is a pleasant 68-year-old female returning to the office following the resolution of her shingles to clarify as to whether or not she has symptomatic or asymptomatic calculus cholecystitis.    After thorough history and physical examination the patient appears to be asymptomatic with regards to her gallstone disease and for this reason there is no indication to recommend cholecystectomy.    Patient is well.  She has benign physical examination.    I look forward seeing her back in a years time.  She has been encouraged to follow-up sooner with signs or symptoms of chronic calculus cholecystitis.       Diagnoses and all orders for this visit:    Calculus of gallbladder with chronic cholecystitis without obstruction          Subjective:      Patient ID: Debbi Major is a 68 y.o. female.    Patient is coming into the office for Biliary colic possibly pre op lap jorge. Patient denies any abdominal pain and no fever/chills.TRANG Tripp          Review of Systems   Constitutional:  Negative for chills and fever.   HENT:  Negative for ear pain and sore throat.    Eyes:  Negative for pain and visual disturbance.   Respiratory:  Negative for cough and shortness of breath.    Cardiovascular:  Negative for chest pain and palpitations.   Gastrointestinal:  Negative for abdominal pain and vomiting.   Genitourinary:  Negative for dysuria and hematuria.   Musculoskeletal:  Negative for arthralgias and back pain.   Skin:  Negative for color change and rash.   Neurological:  Negative for seizures and syncope.   All other systems reviewed and are negative.        Objective:      /86 (BP Location: Right arm, Patient Position: Sitting, Cuff Size: Standard)   Pulse 75   Temp (!) 97.2 °F (36.2 °C) (Temporal)   Resp 18   Wt 70.8 kg (156 lb)   SpO2 96%   BMI 27.63 kg/m²          Physical Exam  Constitutional:       Appearance:  She is well-developed.   HENT:      Head: Normocephalic and atraumatic.   Eyes:      Conjunctiva/sclera: Conjunctivae normal.      Pupils: Pupils are equal, round, and reactive to light.   Cardiovascular:      Rate and Rhythm: Normal rate and regular rhythm.   Pulmonary:      Effort: Pulmonary effort is normal.      Breath sounds: Normal breath sounds.   Abdominal:      General: Bowel sounds are normal.      Palpations: Abdomen is soft.      Comments: Benign abdominal examination.   Musculoskeletal:         General: Normal range of motion.      Cervical back: Normal range of motion and neck supple.   Skin:     General: Skin is warm and dry.      Comments: Shingles continues to resolve alongside dermatome distribution in the right thoracic spine   Neurological:      Mental Status: She is alert and oriented to person, place, and time.   Psychiatric:         Behavior: Behavior normal.         Thought Content: Thought content normal.         Judgment: Judgment normal.

## 2024-01-24 ENCOUNTER — OFFICE VISIT (OUTPATIENT)
Dept: SURGERY | Facility: CLINIC | Age: 69
End: 2024-01-24
Payer: MEDICARE

## 2024-01-24 VITALS
HEART RATE: 75 BPM | DIASTOLIC BLOOD PRESSURE: 86 MMHG | SYSTOLIC BLOOD PRESSURE: 130 MMHG | WEIGHT: 156 LBS | RESPIRATION RATE: 18 BRPM | OXYGEN SATURATION: 96 % | BODY MASS INDEX: 27.63 KG/M2 | TEMPERATURE: 97.2 F

## 2024-01-24 DIAGNOSIS — K80.10 CALCULUS OF GALLBLADDER WITH CHRONIC CHOLECYSTITIS WITHOUT OBSTRUCTION: Primary | ICD-10-CM

## 2024-01-24 PROCEDURE — 99213 OFFICE O/P EST LOW 20 MIN: CPT | Performed by: SURGERY

## 2024-01-24 NOTE — ASSESSMENT & PLAN NOTE
Patient is a pleasant 68-year-old female returning to the office following the resolution of her shingles to clarify as to whether or not she has symptomatic or asymptomatic calculus cholecystitis.    After thorough history and physical examination the patient appears to be asymptomatic with regards to her gallstone disease and for this reason there is no indication to recommend cholecystectomy.    Patient is well.  She has benign physical examination.    I look forward seeing her back in a years time.  She has been encouraged to follow-up sooner with signs or symptoms of chronic calculus cholecystitis.

## 2024-02-13 ENCOUNTER — TELEMEDICINE (OUTPATIENT)
Dept: ENDOCRINOLOGY | Facility: CLINIC | Age: 69
End: 2024-02-13
Payer: MEDICARE

## 2024-02-13 VITALS — HEIGHT: 63 IN | WEIGHT: 155 LBS | BODY MASS INDEX: 27.46 KG/M2

## 2024-02-13 DIAGNOSIS — E04.2 MULTINODULAR THYROID: ICD-10-CM

## 2024-02-13 DIAGNOSIS — M81.8 OTHER OSTEOPOROSIS WITHOUT CURRENT PATHOLOGICAL FRACTURE: Primary | ICD-10-CM

## 2024-02-13 DIAGNOSIS — E89.2 STATUS POST PARATHYROIDECTOMY (HCC): ICD-10-CM

## 2024-02-13 PROBLEM — Z90.89 STATUS POST PARATHYROIDECTOMY: Status: ACTIVE | Noted: 2018-07-25

## 2024-02-13 PROBLEM — Z98.890 STATUS POST PARATHYROIDECTOMY: Status: ACTIVE | Noted: 2018-07-25

## 2024-02-13 PROCEDURE — 99442 PR PHYS/QHP TELEPHONE EVALUATION 11-20 MIN: CPT | Performed by: NURSE PRACTITIONER

## 2024-02-13 NOTE — ASSESSMENT & PLAN NOTE
>>ASSESSMENT AND PLAN FOR HYPERPARATHYROIDISM (HCC) WRITTEN ON 2/13/2024  3:59 PM BY JAVON BERGERON    Status post parathyroidectomy.  Check PTH, Vit D, and serum calcium levels.  Patient denies any symptoms of hypocalcemia but I did explain to her that her calcium was low on her October labs.  Once we see a new serum calcium level, may need to increase calcium supplementation.

## 2024-02-13 NOTE — PROGRESS NOTES
Virtual Brief Visit    This Visit is being completed by telephone. The Patient is located at Home and in the following state in which I hold an active license [place]    The patient was identified by name and date of birth. Debbi Major was informed that this is a telemedicine visit and that the visit is being conducted through Telephone.  My office door was closed. No one else was in the room.  She acknowledged consent and understanding of privacy and security of the video platform. The patient has agreed to participate and understands they can discontinue the visit at any time.    Patient is aware this is a billable service.     It was my intent to perform this visit via video technology but the patient was not able to do a video connection so the visit was completed via audio telephone only.    Debbi Major is a 68 y.o. female with primary hyperparathyroidism status post parathyroidectomy, thyroid nodules, and age-related osteoporosis presenting to the office today for routine follow-up.     She did not complete labs prior to today's appointment.    Reports feeling well.    For primary hyperparathyroidism, patient underwent a minimally invasive right parathyroidectomy on 1/3/2023.  She reports feeling well.  Last serum calcium from October 2023 was low at 8.1 mg/dL.  Patient denies ever having had any symptoms of hypocalcemia including paresthesias.  She is receiving calcium through her diet and is also taking a women's One-A-Day in addition to vitamin D 2000 units daily.    For thyroid nodules, she completed a fine-needle aspiration biopsy of the left lower pole thyroid nodule measuring 2.9 x 2.5 x 2.2 cm on 6/9/2022.  Results were benign-[place] category 2.  She completed an ultrasound of the thyroid in December 2023 which demonstrated stable bilateral thyroid nodules.  Denies compressive symptoms.    For osteoporosis, she is taking 70 mg of alendronate once weekly.  Denies side effects.  Denies any recent  falls or fractures.  Next DEXA scan is due in December 2025.  She continues to make an effort to reduce/quit smoking.      Assessment/Plan:    Problem List Items Addressed This Visit       Other osteoporosis without current pathological fracture - Primary     Reviewed DEXA scan with patient.  Continue 70 mg of alendronate once weekly.  Continue vitamin D and calcium supplements.  Continue with weightbearing activity.  Reviewed fall precautions.  Next DEXA scan due in 2025.         Relevant Orders    PTH, intact    Vitamin D 25 hydroxy    Comprehensive metabolic panel    Status post parathyroidectomy (HCC)     >>ASSESSMENT AND PLAN FOR HYPERPARATHYROIDISM (HCC) WRITTEN ON 2/13/2024  3:59 PM BY JAVON BERGERON    Status post parathyroidectomy.  Check PTH, Vit D, and serum calcium levels.  Patient denies any symptoms of hypocalcemia but I did explain to her that her calcium was low on her October labs.  Once we see a new serum calcium level, may need to increase calcium supplementation.         Multinodular thyroid     Reviewed thyroid ultrasound results with patient.  She denies compressive symptoms.  Next ultrasound due in December 2024.  Order placed.            Recent Visits  No visits were found meeting these conditions.  Showing recent visits within past 7 days and meeting all other requirements  Today's Visits  Date Type Provider Dept   02/13/24 Telemedicine JAVON Bergeron  [facility] [place]   Showing today's visits and meeting all other requirements  Future Appointments  No visits were found meeting these conditions.  Showing future appointments within next 150 days and meeting all other requirements         Visit Time  Total Visit Duration: 20 minutes    I have spent a total time of 30 minutes on 02/13/24 in caring for this patient including Diagnostic results, Patient and family education, Risk factor reductions, and Reviewing / ordering tests, medicine,  procedures  .

## 2024-02-13 NOTE — ASSESSMENT & PLAN NOTE
Reviewed DEXA scan with patient.  Continue 70 mg of alendronate once weekly.  Continue vitamin D and calcium supplements.  Continue with weightbearing activity.  Reviewed fall precautions.  Next DEXA scan due in 2025.

## 2024-02-13 NOTE — ASSESSMENT & PLAN NOTE
Status post parathyroidectomy.  Check PTH, Vit D, and serum calcium levels.  Patient denies any symptoms of hypocalcemia but I did explain to her that her calcium was low on her October labs.  Once we see a new serum calcium level, may need to increase calcium supplementation.

## 2024-02-13 NOTE — ASSESSMENT & PLAN NOTE
Reviewed thyroid ultrasound results with patient.  She denies compressive symptoms.  Next ultrasound due in December 2024.  Order placed.

## 2024-03-11 DIAGNOSIS — M81.8 OTHER OSTEOPOROSIS WITHOUT CURRENT PATHOLOGICAL FRACTURE: ICD-10-CM

## 2024-03-11 RX ORDER — ALENDRONATE SODIUM 70 MG/1
TABLET ORAL
Qty: 12 TABLET | Refills: 2 | Status: SHIPPED | OUTPATIENT
Start: 2024-03-11

## 2024-09-07 DIAGNOSIS — E78.00 HIGH CHOLESTEROL: ICD-10-CM

## 2024-09-08 RX ORDER — ATORVASTATIN CALCIUM 20 MG/1
TABLET, FILM COATED ORAL
Qty: 30 TABLET | Refills: 0 | Status: SHIPPED | OUTPATIENT
Start: 2024-09-08

## 2024-09-08 NOTE — TELEPHONE ENCOUNTER
Patient needs updated blood work. Please place orders. A courtesy refill was provided.      Needs Lipid Panel

## 2024-09-25 ENCOUNTER — RA CDI HCC (OUTPATIENT)
Dept: OTHER | Facility: HOSPITAL | Age: 69
End: 2024-09-25

## 2024-10-09 ENCOUNTER — OFFICE VISIT (OUTPATIENT)
Dept: FAMILY MEDICINE CLINIC | Facility: CLINIC | Age: 69
End: 2024-10-09
Payer: MEDICARE

## 2024-10-09 VITALS
TEMPERATURE: 97.9 F | WEIGHT: 153 LBS | DIASTOLIC BLOOD PRESSURE: 84 MMHG | HEART RATE: 67 BPM | BODY MASS INDEX: 27.11 KG/M2 | OXYGEN SATURATION: 98 % | HEIGHT: 63 IN | SYSTOLIC BLOOD PRESSURE: 138 MMHG

## 2024-10-09 DIAGNOSIS — E21.3 HYPERPARATHYROIDISM (HCC): Primary | ICD-10-CM

## 2024-10-09 DIAGNOSIS — Z12.11 SCREEN FOR COLON CANCER: ICD-10-CM

## 2024-10-09 DIAGNOSIS — F41.8 SITUATIONAL ANXIETY: ICD-10-CM

## 2024-10-09 DIAGNOSIS — Z12.31 SCREENING MAMMOGRAM, ENCOUNTER FOR: ICD-10-CM

## 2024-10-09 DIAGNOSIS — Z00.00 MEDICARE ANNUAL WELLNESS VISIT, SUBSEQUENT: ICD-10-CM

## 2024-10-09 DIAGNOSIS — Z28.21 INFLUENZA VACCINATION DECLINED: ICD-10-CM

## 2024-10-09 DIAGNOSIS — Z28.21 COVID-19 VACCINATION DECLINED: ICD-10-CM

## 2024-10-09 DIAGNOSIS — F17.210 SMOKING GREATER THAN 20 PACK YEARS: ICD-10-CM

## 2024-10-09 DIAGNOSIS — E78.00 HIGH CHOLESTEROL: ICD-10-CM

## 2024-10-09 DIAGNOSIS — K59.09 CHRONIC CONSTIPATION: ICD-10-CM

## 2024-10-09 DIAGNOSIS — M81.8 OTHER OSTEOPOROSIS WITHOUT CURRENT PATHOLOGICAL FRACTURE: ICD-10-CM

## 2024-10-09 DIAGNOSIS — F17.200 CURRENT EVERY DAY SMOKER: ICD-10-CM

## 2024-10-09 PROBLEM — K59.00 CONSTIPATION: Status: RESOLVED | Noted: 2022-12-08 | Resolved: 2024-10-09

## 2024-10-09 PROCEDURE — G0438 PPPS, INITIAL VISIT: HCPCS | Performed by: FAMILY MEDICINE

## 2024-10-09 PROCEDURE — 99214 OFFICE O/P EST MOD 30 MIN: CPT | Performed by: FAMILY MEDICINE

## 2024-10-09 RX ORDER — LORAZEPAM 0.5 MG/1
0.5 TABLET ORAL EVERY 8 HOURS PRN
Qty: 15 TABLET | Refills: 0 | Status: SHIPPED | OUTPATIENT
Start: 2024-10-09

## 2024-10-09 NOTE — PATIENT INSTRUCTIONS
Medicare Preventive Visit Patient Instructions  Thank you for completing your Welcome to Medicare Visit or Medicare Annual Wellness Visit today. Your next wellness visit will be due in one year (10/10/2025).  The screening/preventive services that you may require over the next 5-10 years are detailed below. Some tests may not apply to you based off risk factors and/or age. Screening tests ordered at today's visit but not completed yet may show as past due. Also, please note that scanned in results may not display below.  Preventive Screenings:  Service Recommendations Previous Testing/Comments   Colorectal Cancer Screening  * Colonoscopy    * Fecal Occult Blood Test (FOBT)/Fecal Immunochemical Test (FIT)  * Fecal DNA/Cologuard Test  * Flexible Sigmoidoscopy Age: 45-75 years old   Colonoscopy: every 10 years (may be performed more frequently if at higher risk)  OR  FOBT/FIT: every 1 year  OR  Cologuard: every 3 years  OR  Sigmoidoscopy: every 5 years  Screening may be recommended earlier than age 45 if at higher risk for colorectal cancer. Also, an individualized decision between you and your healthcare provider will decide whether screening between the ages of 76-85 would be appropriate. Colonoscopy: 04/06/2016  FOBT/FIT: 03/06/2023  Cologuard: Not on file  Sigmoidoscopy: Not on file    Screening Current     Breast Cancer Screening Age: 40+ years old  Frequency: every 1-2 years  Not required if history of left and right mastectomy Mammogram: 12/20/2023    Screening Current   Cervical Cancer Screening Between the ages of 21-29, pap smear recommended once every 3 years.   Between the ages of 30-65, can perform pap smear with HPV co-testing every 5 years.   Recommendations may differ for women with a history of total hysterectomy, cervical cancer, or abnormal pap smears in past. Pap Smear: 06/15/2022    Screening Not Indicated   Hepatitis C Screening Once for adults born between 1945 and 1965  More frequently in  patients at high risk for Hepatitis C Hep C Antibody: 06/03/2021    Screening Current   Diabetes Screening 1-2 times per year if you're at risk for diabetes or have pre-diabetes Fasting glucose: 74 mg/dL (2/20/2023)  A1C: No results in last 5 years (No results in last 5 years)  Screening Current   Cholesterol Screening Once every 5 years if you don't have a lipid disorder. May order more often based on risk factors. Lipid panel: 02/20/2023    Screening Not Indicated  History Lipid Disorder     Other Preventive Screenings Covered by Medicare:  Abdominal Aortic Aneurysm (AAA) Screening: covered once if your at risk. You're considered to be at risk if you have a family history of AAA.  Lung Cancer Screening: covers low dose CT scan once per year if you meet all of the following conditions: (1) Age 55-77; (2) No signs or symptoms of lung cancer; (3) Current smoker or have quit smoking within the last 15 years; (4) You have a tobacco smoking history of at least 20 pack years (packs per day multiplied by number of years you smoked); (5) You get a written order from a healthcare provider.  Glaucoma Screening: covered annually if you're considered high risk: (1) You have diabetes OR (2) Family history of glaucoma OR (3)  aged 50 and older OR (4)  American aged 65 and older  Osteoporosis Screening: covered every 2 years if you meet one of the following conditions: (1) You're estrogen deficient and at risk for osteoporosis based off medical history and other findings; (2) Have a vertebral abnormality; (3) On glucocorticoid therapy for more than 3 months; (4) Have primary hyperparathyroidism; (5) On osteoporosis medications and need to assess response to drug therapy.   Last bone density test (DXA Scan): 12/20/2023.  HIV Screening: covered annually if you're between the age of 15-65. Also covered annually if you are younger than 15 and older than 65 with risk factors for HIV infection. For pregnant  patients, it is covered up to 3 times per pregnancy.    Immunizations:  Immunization Recommendations   Influenza Vaccine Annual influenza vaccination during flu season is recommended for all persons aged >= 6 months who do not have contraindications   Pneumococcal Vaccine   * Pneumococcal conjugate vaccine = PCV13 (Prevnar 13), PCV15 (Vaxneuvance), PCV20 (Prevnar 20)  * Pneumococcal polysaccharide vaccine = PPSV23 (Pneumovax) Adults 19-65 yo with certain risk factors or if 65+ yo  If never received any pneumonia vaccine: recommend Prevnar 20 (PCV20)  Give PCV20 if previously received 1 dose of PCV13 or PPSV23   Hepatitis B Vaccine 3 dose series if at intermediate or high risk (ex: diabetes, end stage renal disease, liver disease)   Respiratory syncytial virus (RSV) Vaccine - COVERED BY MEDICARE PART D  * RSVPreF3 (Arexvy) CDC recommends that adults 60 years of age and older may receive a single dose of RSV vaccine using shared clinical decision-making (SCDM)   Tetanus (Td) Vaccine - COST NOT COVERED BY MEDICARE PART B Following completion of primary series, a booster dose should be given every 10 years to maintain immunity against tetanus. Td may also be given as tetanus wound prophylaxis.   Tdap Vaccine - COST NOT COVERED BY MEDICARE PART B Recommended at least once for all adults. For pregnant patients, recommended with each pregnancy.   Shingles Vaccine (Shingrix) - COST NOT COVERED BY MEDICARE PART B  2 shot series recommended in those 19 years and older who have or will have weakened immune systems or those 50 years and older     Health Maintenance Due:      Topic Date Due   • Colorectal Cancer Screening  03/07/2023   • Lung Cancer Screening  11/29/2024   • Breast Cancer Screening: Mammogram  12/20/2024   • Hepatitis C Screening  Completed   • Cervical Cancer Screening  Discontinued     Immunizations Due:      Topic Date Due   • Hepatitis A Vaccine (1 of 2 - Risk 2-dose series) Never done   • COVID-19 Vaccine  (3 - 2023-24 season) 09/01/2024     Advance Directives   What are advance directives?  Advance directives are legal documents that state your wishes and plans for medical care. These plans are made ahead of time in case you lose your ability to make decisions for yourself. Advance directives can apply to any medical decision, such as the treatments you want, and if you want to donate organs.   What are the types of advance directives?  There are many types of advance directives, and each state has rules about how to use them. You may choose a combination of any of the following:  Living will:  This is a written record of the treatment you want. You can also choose which treatments you do not want, which to limit, and which to stop at a certain time. This includes surgery, medicine, IV fluid, and tube feedings.   Durable power of  for healthcare (DPAHC):  This is a written record that states who you want to make healthcare choices for you when you are unable to make them for yourself. This person, called a proxy, is usually a family member or a friend. You may choose more than 1 proxy.  Do not resuscitate (DNR) order:  A DNR order is used in case your heart stops beating or you stop breathing. It is a request not to have certain forms of treatment, such as CPR. A DNR order may be included in other types of advance directives.  Medical directive:  This covers the care that you want if you are in a coma, near death, or unable to make decisions for yourself. You can list the treatments you want for each condition. Treatment may include pain medicine, surgery, blood transfusions, dialysis, IV or tube feedings, and a ventilator (breathing machine).  Values history:  This document has questions about your views, beliefs, and how you feel and think about life. This information can help others choose the care that you would choose.  Why are advance directives important?  An advance directive helps you control your  care. Although spoken wishes may be used, it is better to have your wishes written down. Spoken wishes can be misunderstood, or not followed. Treatments may be given even if you do not want them. An advance directive may make it easier for your family to make difficult choices about your care.   Cigarette Smoking and Your Health   Risks to your health if you smoke:  Nicotine and other chemicals found in tobacco damage every cell in your body. Even if you are a light smoker, you have an increased risk for cancer, heart disease, and lung disease. If you are pregnant or have diabetes, smoking increases your risk for complications.   Benefits to your health if you stop smoking:   You decrease respiratory symptoms such as coughing, wheezing, and shortness of breath.   You reduce your risk for cancers of the lung, mouth, throat, kidney, bladder, pancreas, stomach, and cervix. If you already have cancer, you increase the benefits of chemotherapy. You also reduce your risk for cancer returning or a second cancer from developing.   You reduce your risk for heart disease, blood clots, heart attack, and stroke.   You reduce your risk for lung infections, and diseases such as pneumonia, asthma, chronic bronchitis, and emphysema.  Your circulation improves. More oxygen can be delivered to your body. If you have diabetes, you lower your risk for complications, such as kidney, artery, and eye diseases. You also lower your risk for nerve damage. Nerve damage can lead to amputations, poor vision, and blindness.  You improve your body's ability to heal and to fight infections.  For more information and support to stop smoking:   [employer]  Phone: 3- 308 - 206-4202  Web Address: www.[employer]  Weight Management   Why it is important to manage your weight:  Being overweight increases your risk of health conditions such as heart disease, high blood pressure, type 2 diabetes, and certain types of cancer. It can also increase your  risk for osteoarthritis, sleep apnea, and other respiratory problems. Aim for a slow, steady weight loss. Even a small amount of weight loss can lower your risk of health problems.  How to lose weight safely:  A safe and healthy way to lose weight is to eat fewer calories and get regular exercise. You can lose up about 1 pound a week by decreasing the number of calories you eat by 500 calories each day.   Healthy meal plan for weight management:  A healthy meal plan includes a variety of foods, contains fewer calories, and helps you stay healthy. A healthy meal plan includes the following:  Eat whole-grain foods more often.  A healthy meal plan should contain fiber. Fiber is the part of grains, fruits, and vegetables that is not broken down by your body. Whole-grain foods are healthy and provide extra fiber in your diet. Some examples of whole-grain foods are whole-wheat breads and pastas, oatmeal, brown rice, and bulgur.  Eat a variety of vegetables every day.  Include dark, leafy greens such as spinach, kale, nicole greens, and mustard greens. Eat yellow and orange vegetables such as carrots, sweet potatoes, and winter squash.   Eat a variety of fruits every day.  Choose fresh or canned fruit (canned in its own juice or light syrup) instead of juice. Fruit juice has very little or no fiber.  Eat low-fat dairy foods.  Drink fat-free (skim) milk or 1% milk. Eat fat-free yogurt and low-fat cottage cheese. Try low-fat cheeses such as mozzarella and other reduced-fat cheeses.  Choose meat and other protein foods that are low in fat.  Choose beans or other legumes such as split peas or lentils. Choose fish, skinless poultry (chicken or turkey), or lean cuts of red meat (beef or pork). Before you cook meat or poultry, cut off any visible fat.   Use less fat and oil.  Try baking foods instead of frying them. Add less fat, such as margarine, sour cream, regular salad dressing and mayonnaise to foods. Eat fewer high-fat  foods. Some examples of high-fat foods include french fries, doughnuts, ice cream, and cakes.  Eat fewer sweets.  Limit foods and drinks that are high in sugar. This includes candy, cookies, regular soda, and sweetened drinks.  Exercise:  Exercise at least 30 minutes per day on most days of the week. Some examples of exercise include walking, biking, dancing, and swimming. You can also fit in more physical activity by taking the stairs instead of the elevator or parking farther away from stores. Ask your healthcare provider about the best exercise plan for you.      © Copyright [employer] 2018 Information is for End User's use only and may not be sold, redistributed or otherwise used for commercial purposes. All illustrations and images included in CareNotes® are the copyrighted property of [employer] or [employer]

## 2024-10-09 NOTE — PROGRESS NOTES
Ambulatory Visit  Name: Debbi Major      : 1955      MRN: [id number]  Encounter Provider: Amanda Wynn MD  Encounter Date: 10/9/2024   Encounter department: [facility]    Assessment & Plan  Hyperparathyroidism (HCC)  Update labs  PTH ordered by endo  Orders:    Comprehensive metabolic panel; Future    Vitamin D 25 hydroxy; Future    Smoking greater than 20 pack years  Counseling - unwilling to quit  Update lung ct  Orders:    CT lung screening program; Future    CBC and differential; Future    Comprehensive metabolic panel; Future    Current every day smoker  Counseling - unwilling to quit  Update lung ct       Other osteoporosis without current pathological fracture  On Fosamax  Update labs  DEXA UTD         Screening mammogram, encounter for    Orders:    Mammo screening bilateral w 3d and cad; Future    Screen for colon cancer    Orders:    Ambulatory Referral to Gastroenterology; Future    High cholesterol    Orders:    Comprehensive metabolic panel; Future    Lipid Panel with Direct LDL reflex; Future    COVID-19 vaccination declined         Influenza vaccination declined         Situational anxiety  Ativan renewed, taking very sparingly. Last filled . PDMP verified.   Orders:    LORazepam (Ativan) 0.5 mg tablet; Take 1 tablet (0.5 mg total) by mouth every 8 (eight) hours as needed for anxiety    Chronic constipation  Increase fiber & water  Schedule colonoscopy        Medicare annual wellness visit, subsequent           Depression Screening and Follow-up Plan: Patient was screened for depression during today's encounter. They screened negative with a PHQ-2 score of 1.    Tobacco Cessation Counseling: Tobacco cessation counseling was provided. The patient is sincerely urged to quit consumption of tobacco. She is not ready to quit tobacco.       Preventive health issues were discussed with patient, and age appropriate screening tests were ordered as noted in patient's  After Visit Summary. Personalized health advice and appropriate referrals for health education or preventive services given if needed, as noted in patient's After Visit Summary.    History of Present Illness     69 year old here for AWV and has concerns  She says she has constipation ongoing. She does take a fiber supplement which helps. She denies blood in the stools.  Is due for colonoscopy. She has had constipation on and off for awhile.   She is due for labs  Has anxiety on occasion. Lost her  years ago and recently in April lost his helper dog. She is tearful regarding this. Asking for Ativan refilled, taking very rarely but it does help calm her down.        Patient Care Team:  Amanda Wynn MD as PCP - General  Amanda Wynn MD as PCP - PCP-[facility] ([facility])  Amanda Wynn MD as PCP - PCP-[facility] ([facility])  JAVON Albert as PCP - Endocrinology (Endocrinology)  Davon Garrido MD (Oncology)  JAVON Albert as Nurse Practitioner (Endocrinology)    Review of Systems   Constitutional:  Negative for activity change.   Respiratory:  Negative for chest tightness and shortness of breath.    Cardiovascular:  Negative for chest pain and leg swelling.   Gastrointestinal:  Positive for constipation. Negative for abdominal pain, anal bleeding and blood in stool.   Endocrine: Negative.    Genitourinary: Negative.    Musculoskeletal: Negative.    Neurological:  Negative for headaches.   Psychiatric/Behavioral:  Negative for dysphoric mood. The patient is nervous/anxious.      Medical History Reviewed by provider this encounter:       Annual Wellness Visit Questionnaire   Debbi is here for her Subsequent Wellness visit. Last Medicare Wellness visit information reviewed, patient interviewed and updates made to the record today.      Health Risk Assessment:   Patient rates overall health as good. Patient feels that their physical health rating is same. Patient is very  satisfied with their life. Eyesight was rated as same. Hearing was rated as same. Patient feels that their emotional and mental health rating is same. Patients states they are sometimes angry. Patient states they are sometimes unusually tired/fatigued. Pain experienced in the last 7 days has been none. Patient states that she has experienced no weight loss or gain in last 6 months.     Depression Screening:   PHQ-2 Score: 1      Fall Risk Screening:   In the past year, patient has experienced: no history of falling in past year      Urinary Incontinence Screening:   Patient has not leaked urine accidently in the last six months.     Home Safety:  Patient does not have trouble with stairs inside or outside of their home. Patient has working smoke alarms and has working carbon monoxide detector. Home safety hazards include: none.     Nutrition:   Current diet is Regular.     Medications:   Patient is not currently taking any over-the-counter supplements. Patient is able to manage medications.     Activities of Daily Living (ADLs)/Instrumental Activities of Daily Living (IADLs):   Walk and transfer into and out of bed and chair?: Yes  Dress and groom yourself?: Yes    Bathe or shower yourself?: Yes    Feed yourself? Yes  Do your laundry/housekeeping?: Yes  Manage your money, pay your bills and track your expenses?: Yes  Make your own meals?: Yes    Do your own shopping?: Yes    Previous Hospitalizations:   Any hospitalizations or ED visits within the last 12 months?: No      Advance Care Planning:   Living will: No    Durable POA for healthcare: No    Advanced directive: No    ACP document given: Yes      Cognitive Screening:   Provider or family/friend/caregiver concerned regarding cognition?: No    PREVENTIVE SCREENINGS      Cardiovascular Screening:    General: Screening Not Indicated, History Lipid Disorder and Risks and Benefits Discussed    Due for: Lipid Panel      Diabetes Screening:     General: Screening  Current and Risks and Benefits Discussed    Due for: Blood Glucose      Colorectal Cancer Screening:     General: Screening Current      Breast Cancer Screening:     General: Screening Current      Cervical Cancer Screening:    General: Screening Not Indicated, Risks and Benefits Discussed and Screening Current      Osteoporosis Screening:    General: Screening Not Indicated, History Osteoporosis, Risks and Benefits Discussed and Screening Current      Abdominal Aortic Aneurysm (AAA) Screening:        General: Screening Not Indicated      Lung Cancer Screening:     General: Screening Not Indicated and Risks and Benefits Discussed    Due for: Low Dose CT (LDCT)      Hepatitis C Screening:    General: Screening Current    Screening, Brief Intervention, and Referral to Treatment (SBIRT)    Screening  Typical number of drinks in a day: 0  Typical number of drinks in a week: 0  Interpretation: Low risk drinking behavior.    Single Item Drug Screening:  How often have you used an illegal drug (including marijuana) or a prescription medication for non-medical reasons in the past year? never    Single Item Drug Screen Score: 0  Interpretation: Negative screen for possible drug use disorder    Social Determinants of Health     Financial Resource Strain: Low Risk  (10/4/2023)    Overall Financial Resource Strain (CARDIA)     Difficulty of Paying Living Expenses: Not very hard   Food Insecurity: Patient Declined (10/9/2024)    Hunger Vital Sign     Worried About Running Out of Food in the Last Year: Patient declined     Ran Out of Food in the Last Year: Patient declined   Transportation Needs: No Transportation Needs (10/9/2024)    PRAPARE - Transportation     Lack of Transportation (Medical): No     Lack of Transportation (Non-Medical): No   Housing Stability: Low Risk  (10/9/2024)    Housing Stability Vital Sign     Unable to Pay for Housing in the Last Year: No     Number of Times Moved in the Last Year: 1     Homeless in  "the Last Year: No   Utilities: Not At Risk (10/9/2024)    [facility] Utilities     Threatened with loss of utilities: No     No results found.    Objective     /84 (BP Location: Left arm, Patient Position: Sitting)   Pulse 67   Temp 97.9 °F (36.6 °C) (Temporal)   Ht 5' 3\" (1.6 m)   Wt 69.4 kg (153 lb)   SpO2 98%   BMI 27.10 kg/m²     Physical Exam  Constitutional:       General: She is not in acute distress.     Appearance: Normal appearance. She is well-developed. She is not ill-appearing, toxic-appearing or diaphoretic.   HENT:      Head: Normocephalic and atraumatic.      Right Ear: Tympanic membrane, ear canal and external ear normal.      Left Ear: Tympanic membrane, ear canal and external ear normal.      Mouth/Throat:      Mouth: Mucous membranes are moist.      Pharynx: Oropharynx is clear. No oropharyngeal exudate.   Eyes:      General: No scleral icterus.        Right eye: No discharge.         Left eye: No discharge.      Conjunctiva/sclera: Conjunctivae normal.      Pupils: Pupils are equal, round, and reactive to light.   Neck:      Thyroid: No thyromegaly.   Cardiovascular:      Rate and Rhythm: Normal rate and regular rhythm.      Heart sounds: Normal heart sounds. No murmur heard.     No friction rub. No gallop.   Pulmonary:      Effort: Pulmonary effort is normal. No respiratory distress.      Breath sounds: Normal breath sounds. No wheezing or rales.   Chest:      Chest wall: No tenderness.   Abdominal:      General: Bowel sounds are normal. There is no distension.      Palpations: Abdomen is soft. There is no mass.      Tenderness: There is no abdominal tenderness. There is no guarding or rebound.      Hernia: No hernia is present.   Lymphadenopathy:      Cervical: No cervical adenopathy.   Skin:     General: Skin is warm.      Findings: No rash.   Neurological:      General: No focal deficit present.      Mental Status: She is alert and oriented to person, place, and time. Mental status is " at baseline.      Cranial Nerves: No cranial nerve deficit.   Psychiatric:         Mood and Affect: Mood normal. Affect is tearful.         Behavior: Behavior normal.         Thought Content: Thought content normal.         Judgment: Judgment normal.

## 2024-10-09 NOTE — ASSESSMENT & PLAN NOTE
Ativan renewed, taking very sparingly. Last filled 2021. PDMP verified.   Orders:    LORazepam (Ativan) 0.5 mg tablet; Take 1 tablet (0.5 mg total) by mouth every 8 (eight) hours as needed for anxiety

## 2024-11-27 DIAGNOSIS — M81.8 OTHER OSTEOPOROSIS WITHOUT CURRENT PATHOLOGICAL FRACTURE: ICD-10-CM

## 2024-11-27 RX ORDER — ALENDRONATE SODIUM 70 MG/1
TABLET ORAL
Qty: 12 TABLET | Refills: 1 | Status: SHIPPED | OUTPATIENT
Start: 2024-11-27

## 2024-12-17 ENCOUNTER — TELEPHONE (OUTPATIENT)
Age: 69
End: 2024-12-17

## 2024-12-17 ENCOUNTER — PREP FOR PROCEDURE (OUTPATIENT)
Age: 69
End: 2024-12-17

## 2024-12-17 DIAGNOSIS — Z12.11 SCREENING FOR COLON CANCER: Primary | ICD-10-CM

## 2024-12-17 NOTE — TELEPHONE ENCOUNTER
Scheduled date of colonoscopy (as of today): 1/17/2025  Physician performing colonoscopy:   Location of colonoscopy: [place]  Bowel prep reviewed with patient: Meche Paige  Instructions reviewed with patient by: Meche Paige  Clearances: N/A    Please mail prep

## 2024-12-17 NOTE — TELEPHONE ENCOUNTER
12/17/24  Screened by: Meche Paige    Referring Provider     Pre- Screening:     There is no height or weight on file to calculate BMI.  Has patient been referred for a routine screening Colonoscopy? yes  Is the patient between 45-75 years old? yes      Previous Colonoscopy no   If yes:    Date: N/A    Facility: N/A    Reason: N/A          Does the patient want to see a Gastroenterologist prior to their procedure OR are they having any GI symptoms? no    Has the patient been hospitalized or had abdominal surgery in the past 6 months? no    Does the patient use supplemental oxygen? no    Does the patient take Coumadin, Lovenox, Plavix, Elliquis, Xarelto, or other blood thinning medication? no    Has the patient had a stroke, cardiac event, or stent placed in the past year? no        If patient is between 45yrs - 49yrs, please advise patient that we will have to confirm benefits & coverage with their insurance company for a routine screening colonoscopy.

## 2024-12-17 NOTE — TELEPHONE ENCOUNTER
Please mail mishel dul prep instructions to patients address as she does not have either mychart or email

## 2024-12-23 ENCOUNTER — HOSPITAL ENCOUNTER (OUTPATIENT)
Dept: MAMMOGRAPHY | Facility: HOSPITAL | Age: 69
Discharge: HOME | End: 2024-12-23
Payer: MEDICARE

## 2024-12-23 DIAGNOSIS — Z12.31 SCREENING MAMMOGRAM, ENCOUNTER FOR: ICD-10-CM

## 2024-12-23 PROCEDURE — 77067 SCR MAMMO BI INCL CAD: CPT

## 2024-12-23 PROCEDURE — 77063 BREAST TOMOSYNTHESIS BI: CPT

## 2024-12-24 ENCOUNTER — RESULTS FOLLOW-UP (OUTPATIENT)
Dept: FAMILY MEDICINE CLINIC | Facility: CLINIC | Age: 69
End: 2024-12-24

## 2024-12-27 ENCOUNTER — HOSPITAL ENCOUNTER (OUTPATIENT)
Dept: ULTRASOUND IMAGING | Facility: HOSPITAL | Age: 69
End: 2024-12-27
Payer: MEDICARE

## 2024-12-27 ENCOUNTER — HOSPITAL ENCOUNTER (OUTPATIENT)
Dept: CT IMAGING | Facility: HOSPITAL | Age: 69
End: 2024-12-27
Payer: MEDICARE

## 2024-12-27 DIAGNOSIS — F17.210 SMOKING GREATER THAN 20 PACK YEARS: ICD-10-CM

## 2024-12-27 DIAGNOSIS — E04.2 MULTIPLE THYROID NODULES: ICD-10-CM

## 2024-12-27 PROCEDURE — 76536 US EXAM OF HEAD AND NECK: CPT

## 2025-01-04 ENCOUNTER — RESULTS FOLLOW-UP (OUTPATIENT)
Dept: ENDOCRINOLOGY | Facility: CLINIC | Age: 70
End: 2025-01-04

## 2025-01-06 NOTE — TELEPHONE ENCOUNTER
Patient called back, informed her of provider's message.  She said she already scheduled the appointment.  No questions or concerns.

## 2025-01-17 ENCOUNTER — ANESTHESIA (OUTPATIENT)
Dept: GASTROENTEROLOGY | Facility: HOSPITAL | Age: 70
End: 2025-01-17
Payer: MEDICARE

## 2025-01-17 ENCOUNTER — HOSPITAL ENCOUNTER (OUTPATIENT)
Dept: GASTROENTEROLOGY | Facility: HOSPITAL | Age: 70
Setting detail: OUTPATIENT SURGERY
End: 2025-01-17
Attending: STUDENT IN AN ORGANIZED HEALTH CARE EDUCATION/TRAINING PROGRAM
Payer: MEDICARE

## 2025-01-17 ENCOUNTER — ANESTHESIA EVENT (OUTPATIENT)
Dept: GASTROENTEROLOGY | Facility: HOSPITAL | Age: 70
End: 2025-01-17
Payer: MEDICARE

## 2025-01-17 VITALS
DIASTOLIC BLOOD PRESSURE: 82 MMHG | BODY MASS INDEX: 27.11 KG/M2 | OXYGEN SATURATION: 97 % | HEIGHT: 63 IN | SYSTOLIC BLOOD PRESSURE: 120 MMHG | RESPIRATION RATE: 18 BRPM | HEART RATE: 74 BPM | WEIGHT: 153 LBS | TEMPERATURE: 97.3 F

## 2025-01-17 DIAGNOSIS — Z12.11 SCREENING FOR COLON CANCER: ICD-10-CM

## 2025-01-17 PROCEDURE — 88305 TISSUE EXAM BY PATHOLOGIST: CPT | Performed by: PATHOLOGY

## 2025-01-17 PROCEDURE — 45385 COLONOSCOPY W/LESION REMOVAL: CPT | Performed by: STUDENT IN AN ORGANIZED HEALTH CARE EDUCATION/TRAINING PROGRAM

## 2025-01-17 RX ORDER — SODIUM CHLORIDE, SODIUM LACTATE, POTASSIUM CHLORIDE, CALCIUM CHLORIDE 600; 310; 30; 20 MG/100ML; MG/100ML; MG/100ML; MG/100ML
INJECTION, SOLUTION INTRAVENOUS CONTINUOUS PRN
Status: DISCONTINUED | OUTPATIENT
Start: 2025-01-17 | End: 2025-01-17

## 2025-01-17 RX ORDER — PROPOFOL 10 MG/ML
INJECTION, EMULSION INTRAVENOUS AS NEEDED
Status: DISCONTINUED | OUTPATIENT
Start: 2025-01-17 | End: 2025-01-17

## 2025-01-17 RX ORDER — PROPOFOL 10 MG/ML
INJECTION, EMULSION INTRAVENOUS CONTINUOUS PRN
Status: DISCONTINUED | OUTPATIENT
Start: 2025-01-17 | End: 2025-01-17

## 2025-01-17 RX ADMIN — SODIUM CHLORIDE, SODIUM LACTATE, POTASSIUM CHLORIDE, AND CALCIUM CHLORIDE: .6; .31; .03; .02 INJECTION, SOLUTION INTRAVENOUS at 07:53

## 2025-01-17 RX ADMIN — PROPOFOL 120 MG: 10 INJECTION, EMULSION INTRAVENOUS at 08:06

## 2025-01-17 RX ADMIN — PROPOFOL 100 MCG/KG/MIN: 10 INJECTION, EMULSION INTRAVENOUS at 08:06

## 2025-01-17 NOTE — ANESTHESIA POSTPROCEDURE EVALUATION
Post-Op Assessment Note    CV Status:  Stable  Pain Score: 0    Pain management: adequate       Mental Status:  Alert and awake   Hydration Status:  Euvolemic   PONV Controlled:  Controlled   Airway Patency:  Patent     Post Op Vitals Reviewed: Yes    No anethesia notable event occurred.    Staff: CRNA, Anesthesiologist           Last Filed PACU Vitals:  Vitals Value Taken Time   Temp     Pulse 69    /67    Resp 16    SpO2 98

## 2025-01-17 NOTE — ANESTHESIA PREPROCEDURE EVALUATION
Procedure:  COLONOSCOPY    Relevant Problems   CARDIO   (+) High cholesterol      NEURO/PSYCH   (+) Situational anxiety      PULMONARY   (+) Current every day smoker      FEN/Gastrointestinal   (+) Chronic constipation      Other   (+) Environmental allergies        Physical Exam    Airway    Mallampati score: II  TM Distance: >3 FB  Neck ROM: full     Dental       Cardiovascular      Pulmonary      Other Findings  post-pubertal.      Anesthesia Plan  ASA Score- 2     Anesthesia Type- IV sedation with anesthesia with ASA Monitors.         Additional Monitors:     Airway Plan:            Plan Factors-Exercise tolerance (METS): >4 METS.    Chart reviewed.    Patient summary reviewed.    Patient is a current smoker.  Patient smoked on day of surgery.            Induction- intravenous.    Postoperative Plan-     Perioperative Resuscitation Plan - Level 1 - Full Code.       Informed Consent- Anesthetic plan and risks discussed with patient.  I personally reviewed this patient with the CRNA. Discussed and agreed on the Anesthesia Plan with the CRNA..      NPO Status:  Vitals Value Taken Time   Date of last liquid 01/17/25 01/17/25 0722   Time of last liquid 1230 01/17/25 0722   Date of last solid 01/15/25 01/17/25 0722   Time of last solid 1800 01/17/25 0722

## 2025-01-17 NOTE — H&P
[facility]  History & Physical     PATIENT INFO     Name: Debbi Major  YOB: 1955   Age: 69 y.o.   Sex: female   MRN: [id number]     HISTORY OF PRESENT ILLNESS     Debbi Major is a 69 y.o. year old female who presents for screening colonoscopy.  Last colonoscopy in .  No antithrombotics or anticoagulants.     REVIEW OF SYSTEMS     Per the HPI, and otherwise unremarkable.    Historical Information   Past Medical History:   Diagnosis Date    Depression     Hyperlipidemia     last assessed - 2016    Hyperparathyroidism (HCC) 2018     Past Surgical History:   Procedure Laterality Date     SECTION      TN PARATHYROIDECTOMY/EXPLORATION PARATHYROIDS Right 1/3/2023    Procedure: MINIMALLY INVASIVE RIGHT PARATHYROIDECTOMY;  Surgeon: Davon Garrido MD;  Location:  MAIN OR;  Service: Surgical Oncology    US GUIDED THYROID BIOPSY  2022     Social History   Social History     Substance and Sexual Activity   Alcohol Use No     Social History     Substance and Sexual Activity   Drug Use No     Social History     Tobacco Use   Smoking Status Every Day    Current packs/day: 0.50    Average packs/day: 0.5 packs/day for 30.0 years (15.0 ttl pk-yrs)    Types: Cigarettes   Smokeless Tobacco Never     Family History   Problem Relation Age of Onset    Cancer Mother     Cancer Father     Cancer Sister     Breast cancer Sister     No Known Problems Sister     No Known Problems Sister     No Known Problems Sister     No Known Problems Sister     No Known Problems Sister     No Known Problems Sister     No Known Problems Maternal Grandmother     No Known Problems Paternal Grandmother     No Known Problems Maternal Aunt     No Known Problems Maternal Aunt     No Known Problems Maternal Aunt     No Known Problems Paternal Aunt     No Known Problems Paternal Aunt         MEDICATIONS & ALLERGIES     Current Outpatient Medications   Medication Instructions     "alendronate (FOSAMAX) 70 mg tablet TAKE 1 TABLET BY MOUTH EVERY 7 DAYS    atorvastatin (LIPITOR) 20 mg tablet TAKE ONE TABLET BY MOUTH EVERY DAY    Calcium Carbonate-Vit D-Min (CALCIUM 1200 PO) Take by mouth    Cholecalciferol 50 MCG (2000 UT) CAPS Daily    fluticasone (FLONASE) 50 mcg/act nasal spray 2 sprays, As needed    LORazepam (ATIVAN) 0.5 mg, Oral, Every 8 hours PRN    Multiple Vitamin (multivitamin) capsule 1 capsule, Daily    timolol (TIMOPTIC) 0.5 % ophthalmic solution 1 drop, 2 times daily     Allergies   Allergen Reactions    Sulfa Antibiotics Hives        PHYSICAL EXAM      Objective   Blood pressure 129/83, pulse 77, temperature 98.7 °F (37.1 °C), temperature source Temporal, resp. rate 20, height 5' 3\" (1.6 m), weight 69.4 kg (153 lb), SpO2 97%. Body mass index is 27.1 kg/m².    General Appearance:   Alert, cooperative, no distress   Lungs:   Equal chest rise, respirations unlabored    Heart:   Regular rate and rhythm   Abdomen:   Soft, non-tender, non-distended; normal bowel sounds; no masses, no organomegaly    Extremities:   No edema       ASSESSMENT & PLAN     This is a 69 y.o. year old female here for colonoscopy, and she is stable and optimized for her procedure.      Getachew Tripp D.O.  [facility]  Division of Gastroenterology & Hepatology  Available on Tiger[employer]  [email or web address]    ** Please Note: This note is constructed using a voice recognition dictation system. **  "

## 2025-01-21 ENCOUNTER — RESULTS FOLLOW-UP (OUTPATIENT)
Dept: GASTROENTEROLOGY | Facility: CLINIC | Age: 70
End: 2025-01-21

## 2025-01-21 PROCEDURE — 88305 TISSUE EXAM BY PATHOLOGIST: CPT | Performed by: PATHOLOGY

## 2025-01-24 DIAGNOSIS — E78.00 HIGH CHOLESTEROL: ICD-10-CM

## 2025-01-24 RX ORDER — ATORVASTATIN CALCIUM 20 MG/1
20 TABLET, FILM COATED ORAL DAILY
Qty: 30 TABLET | Refills: 0 | Status: SHIPPED | OUTPATIENT
Start: 2025-01-24

## 2025-02-05 ENCOUNTER — APPOINTMENT (OUTPATIENT)
Dept: LAB | Facility: CLINIC | Age: 70
End: 2025-02-05
Payer: MEDICARE

## 2025-02-05 DIAGNOSIS — M81.8 OTHER OSTEOPOROSIS WITHOUT CURRENT PATHOLOGICAL FRACTURE: ICD-10-CM

## 2025-02-05 DIAGNOSIS — E78.00 HIGH CHOLESTEROL: ICD-10-CM

## 2025-02-05 DIAGNOSIS — E21.3 HYPERPARATHYROIDISM (HCC): ICD-10-CM

## 2025-02-05 DIAGNOSIS — F17.210 SMOKING GREATER THAN 20 PACK YEARS: ICD-10-CM

## 2025-02-05 LAB
25(OH)D3 SERPL-MCNC: 44.3 NG/ML (ref 30–100)
BASOPHILS # BLD AUTO: 0.03 THOUSANDS/ΜL (ref 0–0.1)
BASOPHILS NFR BLD AUTO: 1 % (ref 0–1)
EOSINOPHIL # BLD AUTO: 0.15 THOUSAND/ΜL (ref 0–0.61)
EOSINOPHIL NFR BLD AUTO: 3 % (ref 0–6)
ERYTHROCYTE [DISTWIDTH] IN BLOOD BY AUTOMATED COUNT: 13 % (ref 11.6–15.1)
HCT VFR BLD AUTO: 43.4 % (ref 34.8–46.1)
HGB BLD-MCNC: 14.2 G/DL (ref 11.5–15.4)
IMM GRANULOCYTES # BLD AUTO: 0.01 THOUSAND/UL (ref 0–0.2)
IMM GRANULOCYTES NFR BLD AUTO: 0 % (ref 0–2)
LYMPHOCYTES # BLD AUTO: 1.69 THOUSANDS/ΜL (ref 0.6–4.47)
LYMPHOCYTES NFR BLD AUTO: 33 % (ref 14–44)
MCH RBC QN AUTO: 30.7 PG (ref 26.8–34.3)
MCHC RBC AUTO-ENTMCNC: 32.7 G/DL (ref 31.4–37.4)
MCV RBC AUTO: 94 FL (ref 82–98)
MONOCYTES # BLD AUTO: 0.6 THOUSAND/ΜL (ref 0.17–1.22)
MONOCYTES NFR BLD AUTO: 12 % (ref 4–12)
NEUTROPHILS # BLD AUTO: 2.64 THOUSANDS/ΜL (ref 1.85–7.62)
NEUTS SEG NFR BLD AUTO: 51 % (ref 43–75)
NRBC BLD AUTO-RTO: 0 /100 WBCS
PLATELET # BLD AUTO: 261 THOUSANDS/UL (ref 149–390)
PMV BLD AUTO: 11.3 FL (ref 8.9–12.7)
PTH-INTACT SERPL-MCNC: 37.9 PG/ML (ref 12–88)
RBC # BLD AUTO: 4.63 MILLION/UL (ref 3.81–5.12)
WBC # BLD AUTO: 5.12 THOUSAND/UL (ref 4.31–10.16)

## 2025-02-05 PROCEDURE — 83970 ASSAY OF PARATHORMONE: CPT

## 2025-02-05 PROCEDURE — 80053 COMPREHEN METABOLIC PANEL: CPT

## 2025-02-05 PROCEDURE — 36415 COLL VENOUS BLD VENIPUNCTURE: CPT

## 2025-02-05 PROCEDURE — 85025 COMPLETE CBC W/AUTO DIFF WBC: CPT

## 2025-02-05 PROCEDURE — 80061 LIPID PANEL: CPT

## 2025-02-05 PROCEDURE — 82306 VITAMIN D 25 HYDROXY: CPT

## 2025-02-06 ENCOUNTER — RESULTS FOLLOW-UP (OUTPATIENT)
Dept: ENDOCRINOLOGY | Facility: CLINIC | Age: 70
End: 2025-02-06

## 2025-02-06 LAB
ALBUMIN SERPL BCG-MCNC: 4.1 G/DL (ref 3.5–5)
ALP SERPL-CCNC: 70 U/L (ref 34–104)
ALT SERPL W P-5'-P-CCNC: 10 U/L (ref 7–52)
ANION GAP SERPL CALCULATED.3IONS-SCNC: 9 MMOL/L (ref 4–13)
AST SERPL W P-5'-P-CCNC: 14 U/L (ref 13–39)
BILIRUB SERPL-MCNC: 0.4 MG/DL (ref 0.2–1)
BUN SERPL-MCNC: 13 MG/DL (ref 5–25)
CALCIUM SERPL-MCNC: 9.9 MG/DL (ref 8.4–10.2)
CHLORIDE SERPL-SCNC: 105 MMOL/L (ref 96–108)
CHOLEST SERPL-MCNC: 163 MG/DL (ref ?–200)
CO2 SERPL-SCNC: 29 MMOL/L (ref 21–32)
CREAT SERPL-MCNC: 0.83 MG/DL (ref 0.6–1.3)
GFR SERPL CREATININE-BSD FRML MDRD: 73 ML/MIN/1.73SQ M
GLUCOSE P FAST SERPL-MCNC: 91 MG/DL (ref 65–99)
HDLC SERPL-MCNC: 49 MG/DL
LDLC SERPL CALC-MCNC: 81 MG/DL (ref 0–100)
POTASSIUM SERPL-SCNC: 4.2 MMOL/L (ref 3.5–5.3)
PROT SERPL-MCNC: 6.9 G/DL (ref 6.4–8.4)
SODIUM SERPL-SCNC: 143 MMOL/L (ref 135–147)
TRIGL SERPL-MCNC: 165 MG/DL (ref ?–150)

## 2025-02-06 NOTE — TELEPHONE ENCOUNTER
----- Message from JAVON Diaz sent at 2/6/2025 10:59 AM EST -----  Labs reviewed.  PTH, serum calcium, serum vitamin D are all normal.

## 2025-02-07 NOTE — TELEPHONE ENCOUNTER
Patient returning call and made aware:    Message from JAVON Diaz sent at 2/6/2025 10:59 AM EST -----  Labs reviewed.  PTH, serum calcium, serum vitamin D are all normal.    Patient verbalized understanding

## 2025-03-11 DIAGNOSIS — E78.00 HIGH CHOLESTEROL: ICD-10-CM

## 2025-03-12 RX ORDER — ATORVASTATIN CALCIUM 20 MG/1
20 TABLET, FILM COATED ORAL DAILY
Qty: 30 TABLET | Refills: 5 | Status: SHIPPED | OUTPATIENT
Start: 2025-03-12

## 2025-03-17 ENCOUNTER — TELEPHONE (OUTPATIENT)
Age: 70
End: 2025-03-17

## 2025-03-17 DIAGNOSIS — M25.551 RIGHT HIP PAIN: Primary | ICD-10-CM

## 2025-03-17 NOTE — TELEPHONE ENCOUNTER
R hip pin she has had on and off for a while she is doing PT and it is not helping.     PCP preferred and chose this visit.   She is on wait list to be seen sooner.        She is scheduled for 04/07/25    Just Fyi in case there is a need for her to be seen sooner

## 2025-03-21 ENCOUNTER — APPOINTMENT (OUTPATIENT)
Dept: RADIOLOGY | Facility: CLINIC | Age: 70
End: 2025-03-21
Payer: MEDICARE

## 2025-03-21 DIAGNOSIS — M25.551 RIGHT HIP PAIN: ICD-10-CM

## 2025-03-21 PROCEDURE — 73502 X-RAY EXAM HIP UNI 2-3 VIEWS: CPT

## 2025-03-23 ENCOUNTER — RESULTS FOLLOW-UP (OUTPATIENT)
Dept: FAMILY MEDICINE CLINIC | Facility: CLINIC | Age: 70
End: 2025-03-23

## 2025-03-24 DIAGNOSIS — M16.11 PRIMARY OSTEOARTHRITIS OF RIGHT HIP: Primary | ICD-10-CM

## 2025-04-01 ENCOUNTER — OFFICE VISIT (OUTPATIENT)
Dept: OBGYN CLINIC | Facility: CLINIC | Age: 70
End: 2025-04-01
Payer: MEDICARE

## 2025-04-01 VITALS — WEIGHT: 140 LBS | BODY MASS INDEX: 24.8 KG/M2 | HEIGHT: 63 IN

## 2025-04-01 DIAGNOSIS — M16.11 PRIMARY OSTEOARTHRITIS OF RIGHT HIP: Primary | ICD-10-CM

## 2025-04-01 PROCEDURE — 99203 OFFICE O/P NEW LOW 30 MIN: CPT | Performed by: ORTHOPAEDIC SURGERY

## 2025-04-01 RX ORDER — CEFAZOLIN SODIUM 2 G/50ML
2000 SOLUTION INTRAVENOUS ONCE
OUTPATIENT
Start: 2025-04-01 | End: 2025-04-01

## 2025-04-01 RX ORDER — MUPIROCIN 20 MG/G
OINTMENT TOPICAL
Qty: 15 G | Refills: 0 | Status: SHIPPED | OUTPATIENT
Start: 2025-04-01

## 2025-04-01 RX ORDER — FOLIC ACID 0.4 MG
400 TABLET ORAL DAILY
Qty: 30 TABLET | Refills: 0 | Status: SHIPPED | OUTPATIENT
Start: 2025-04-01

## 2025-04-01 RX ORDER — FERROUS SULFATE 324(65)MG
324 TABLET, DELAYED RELEASE (ENTERIC COATED) ORAL
Qty: 60 TABLET | Refills: 0 | Status: SHIPPED | OUTPATIENT
Start: 2025-04-01

## 2025-04-01 RX ORDER — CHLORHEXIDINE GLUCONATE 40 MG/ML
SOLUTION TOPICAL DAILY PRN
OUTPATIENT
Start: 2025-04-01

## 2025-04-01 RX ORDER — MULTIVITAMIN
1 TABLET ORAL DAILY
Qty: 30 TABLET | Refills: 0 | Status: SHIPPED | OUTPATIENT
Start: 2025-04-01

## 2025-04-01 RX ORDER — CHLORHEXIDINE GLUCONATE ORAL RINSE 1.2 MG/ML
15 SOLUTION DENTAL ONCE
OUTPATIENT
Start: 2025-04-01 | End: 2025-04-01

## 2025-04-01 RX ORDER — MULTIVIT WITH MINERALS/LUTEIN
500 TABLET ORAL DAILY
Qty: 60 TABLET | Refills: 0 | Status: SHIPPED | OUTPATIENT
Start: 2025-04-01

## 2025-04-01 NOTE — PROGRESS NOTES
Assessment & Plan  Primary osteoarthritis of right hip  The patient as osteoarthritis of the right hip. Discussed the patient's diagnosis and associated treatment options including conservative and surgical treatment. Discussed risks, potential complications, and benefits of surgical procedure in great detail. The patient understands the risks and benefits of all mention treatment options and has no further questions.  The patient has elected to proceed forward with total hip replacement of the right hip. Surgery is tentatively scheduled for June 2, 2025. She will be seen for follow-up 10 to 14 days post-op for reevaluation. A surgical consent was obtained at today's visit. The patient expressed understanding and had the opportunity to ask questions.     Orders:    Ambulatory Referral to Orthopedic Surgery    Diet NPO; Sips with meds; Standing    Nursing Communication Warmimg Interventions Implemented; Standing    Nursing Communication CHG bath, have staff wash entire body (neck down) per pre-op bathing protocol. Routine, evening prior to, and day of surgery.; Standing    Nursing Communication Swab both nares with Povidone-Iodine solution, EXCLUDE if patient has shellfish/Iodine allergy, and replace with nasal alcohol swabstick. Routine, day of surgery, on call to OR; Standing    chlorhexidine (PERIDEX) 0.12 % oral rinse 15 mL    Void on call to OR; Standing    Insert peripheral IV; Standing    Comprehensive metabolic panel; Future    Hemoglobin A1C W/EAG Estimation; Future    CBC and differential; Future    MRSA culture; Future    Anemia Panel w/Reflex; Future    Protime-INR; Future    APTT; Future    [facility] to [facility]; Future    Ambulatory referral to Physical Therapy; Future    Place sequential compression device; Standing    Case request operating room: ARTHROPLASTY HIP TOTAL; Standing    Ambulatory referral to Family Practice; Future    EKG 12 lead; Future    ceFAZolin  (ANCEF) IVPB (premix in dextrose) 2,000 mg 50 mL    chlorhexidine gluconate (HIBICLENS) 4 % topical liquid    ascorbic acid (VITAMIN C) 250 mg tablet; Take 2 tablets (500 mg total) by mouth daily    ferrous sulfate 324 (65 Fe) mg; Take 1 tablet (324 mg total) by mouth 2 (two) times a day before meals    Multiple Vitamin (multivitamin) tablet; Take 1 tablet by mouth daily    folic acid (FOLVITE) 400 mcg tablet; Take 1 tablet (400 mcg total) by mouth daily    The patient has advanced arthritic changes of her right hip.  Treatment options were discussed.  She has opted for elective right total hip replacement.  All risk, complications and benefits were discussed with the patient in great detail including bleeding, infection, blood clots, pain, stiffness, neurovascular damage, fractures, dislocations, the possibility loss elective surgery, heart attack, stroke, wound problems, leg length discrepancies, etc.  Her surgery scheduled for 2025    Subjective:   Patient ID: Debbi Major  1955     HPI  Patient is a 69 y.o. female who presents for initial evaluation of right hip pain. She has had hip pain for several years. She was seen by an orthopedic many years ago and was sent to physical therapy. She notes the therapy helped. She tried to continue the home exercises but they were not helpful. She does struggle going up and down stairs as well as getting in and out of her car. She is concerned with assistance after her surgery as she recovers.     The following portions of the patient's history were reviewed and updated as appropriate:  Past medical history, past surgical history, Family history, social history, current medications and allergies    Past Medical History:   Diagnosis Date    Depression     Hyperlipidemia     last assessed - 2016    Hyperparathyroidism (HCC) 2018       Past Surgical History:   Procedure Laterality Date     SECTION      TX PARATHYROIDECTOMY/EXPLORATION  PARATHYROIDS Right 1/3/2023    Procedure: MINIMALLY INVASIVE RIGHT PARATHYROIDECTOMY;  Surgeon: Davon Garrido MD;  Location: [facility];  Service: Surgical Oncology    US GUIDED THYROID BIOPSY  6/9/2022       Family History   Problem Relation Age of Onset    Cancer Mother     Cancer Father     Cancer Sister     Breast cancer Sister     No Known Problems Sister     No Known Problems Sister     No Known Problems Sister     No Known Problems Sister     No Known Problems Sister     No Known Problems Sister     No Known Problems Maternal Grandmother     No Known Problems Paternal Grandmother     No Known Problems Maternal Aunt     No Known Problems Maternal Aunt     No Known Problems Maternal Aunt     No Known Problems Paternal Aunt     No Known Problems Paternal Aunt        Social History     Socioeconomic History    Marital status: /Civil Union     Spouse name: None    Number of children: None    Years of education: None    Highest education level: None   Occupational History    None   Tobacco Use    Smoking status: Every Day     Current packs/day: 0.50     Average packs/day: 0.5 packs/day for 30.0 years (15.0 ttl pk-yrs)     Types: Cigarettes    Smokeless tobacco: Never   Vaping Use    Vaping status: Never Used   Substance and Sexual Activity    Alcohol use: No    Drug use: No    Sexual activity: None   Other Topics Concern    None   Social History Narrative    Stress at home     Social Drivers of Health     Financial Resource Strain: Low Risk  (10/4/2023)    Overall Financial Resource Strain (CARDIA)     Difficulty of Paying Living Expenses: Not very hard   Food Insecurity: Patient Declined (10/9/2024)    Nursing - Inadequate Food Risk Classification     Worried About Running Out of Food in the Last Year: Patient declined     Ran Out of Food in the Last Year: Patient declined     Ran Out of Food in the Last Year: Not on file   Transportation Needs: No Transportation Needs (10/9/2024)    PRAPARE -  Transportation     Lack of Transportation (Medical): No     Lack of Transportation (Non-Medical): No   Physical Activity: Not on file   Stress: Not on file   Social Connections: Not on file   Intimate Partner Violence: Not on file   Housing Stability: Low Risk  (10/9/2024)    Housing Stability Vital Sign     Unable to Pay for Housing in the Last Year: No     Number of Times Moved in the Last Year: 1     Homeless in the Last Year: No         Current Outpatient Medications:     alendronate (FOSAMAX) 70 mg tablet, TAKE 1 TABLET BY MOUTH EVERY 7 DAYS, Disp: 12 tablet, Rfl: 1    atorvastatin (LIPITOR) 20 mg tablet, TAKE ONE TABLET BY MOUTH DAILY, Disp: 30 tablet, Rfl: 5    Calcium Carbonate-Vit D-Min (CALCIUM 1200 PO), Take by mouth, Disp: , Rfl:     Cholecalciferol 50 MCG (2000 UT) CAPS, Take by mouth daily, Disp: , Rfl:     fluticasone (FLONASE) 50 mcg/act nasal spray, 2 sprays into each nostril if needed, Disp: , Rfl:     LORazepam (Ativan) 0.5 mg tablet, Take 1 tablet (0.5 mg total) by mouth every 8 (eight) hours as needed for anxiety, Disp: 15 tablet, Rfl: 0    Multiple Vitamin (multivitamin) capsule, Take 1 capsule by mouth daily, Disp: , Rfl:     timolol (TIMOPTIC) 0.5 % ophthalmic solution, Administer 1 drop to both eyes 2 (two) times a day, Disp: , Rfl:     Allergies   Allergen Reactions    Sulfa Antibiotics Hives       Review of Systems   Constitutional:  Negative for chills and fever.   HENT:  Negative for ear pain and sore throat.    Eyes:  Negative for pain and visual disturbance.   Respiratory:  Negative for cough and shortness of breath.    Cardiovascular:  Negative for chest pain and palpitations.   Gastrointestinal:  Negative for abdominal pain and vomiting.   Genitourinary:  Negative for dysuria and hematuria.   Musculoskeletal:  Negative for arthralgias and back pain.   Skin:  Negative for color change and rash.   Neurological:  Negative for seizures and syncope.   All other systems reviewed and are  "negative.       Objective:  Ht 5' 3\" (1.6 m)   Wt 63.5 kg (140 lb)   BMI 24.80 kg/m²     Ortho Exam  right Hip -  Patient presents with no anatomical deformity  Ambulates with steady gait pattern  Uses No assistive devices  TTP over anterior hip joint,  groin  Limited passive circumduction   Knee flexor and extensor mechanism intact  Ankle DF and PF mechanism intact  - Leg Length Discrepancy   + Log roll  2+ TP and DP pulses with brisk capillary refill to the toes  Sural, saphenous, tibial, superficial and deep peroneal motor and sensory distribution intact  Sensation to light touch intact distally      Physical Exam  Vitals and nursing note reviewed.   Constitutional:       General: She is not in acute distress.     Appearance: She is well-developed.   HENT:      Head: Normocephalic and atraumatic.   Eyes:      Conjunctiva/sclera: Conjunctivae normal.   Cardiovascular:      Rate and Rhythm: Normal rate and regular rhythm.      Heart sounds: No murmur heard.  Pulmonary:      Effort: Pulmonary effort is normal. No respiratory distress.      Breath sounds: Normal breath sounds.   Abdominal:      Palpations: Abdomen is soft.      Tenderness: There is no abdominal tenderness.   Musculoskeletal:         General: No swelling.      Cervical back: Neck supple.   Skin:     General: Skin is warm and dry.      Capillary Refill: Capillary refill takes less than 2 seconds.   Neurological:      Mental Status: She is alert.   Psychiatric:         Mood and Affect: Mood normal.          Diagnostic Test Review:  X-Ray of right hip obtained on 3/21/2025 were reviewed and demonstrate Severe osteoarthritic degenerative changes of the right hip joint.      Procedures   None performed at today's visit.     Scribe Attestation      I,:  Imani Hernandez am acting as a scribe while in the presence of the attending physician.:       I,:  Terry Alvarado DO personally performed the services described in this documentation    as scribed in " my presence.:

## 2025-04-02 ENCOUNTER — TELEPHONE (OUTPATIENT)
Dept: OBGYN CLINIC | Facility: CLINIC | Age: 70
End: 2025-04-02

## 2025-04-18 ENCOUNTER — APPOINTMENT (OUTPATIENT)
Dept: LAB | Facility: CLINIC | Age: 70
End: 2025-04-18
Payer: MEDICARE

## 2025-04-18 DIAGNOSIS — M16.11 PRIMARY OSTEOARTHRITIS OF RIGHT HIP: ICD-10-CM

## 2025-04-29 ENCOUNTER — TELEPHONE (OUTPATIENT)
Dept: OBGYN CLINIC | Facility: HOSPITAL | Age: 70
End: 2025-04-29

## 2025-04-29 NOTE — TELEPHONE ENCOUNTER
Preoperative Elective Admission Assessment    EKG/LAB/MRSA SWAB/CXR date: 5/5  Living Situation:    Who does pt live with:  son , but going to stay with significant other post-op  What kind of home: ranch  How do they enter the home: front  How many levels in home: 1   # of steps to enter home: 3  # of steps to second floor: n/a  Are there handrails: Yes  Are there landings: No  Sleeping arrangement: first/entry floor  Where is Bathroom: entry level  Where is the tub or shower: step in bath tub w/ grab bars  Toilet height? Concerns for low toilets standard  Dogs or ther pets: 2 dogs     First Floor Setup:   Is there a bathroom: Yes  Where would pt sleep: bed     DME: rolling walker (borrowing). Instructed to bring DOS  We discussed clearing pathways in the home and making sure there is accessibly to use the walker, for example, removing throw rugs.      Patient's Current Level of Function: Ambulates: Independently and ADLs: Independent    Post-op Caregiver: significant other  Caregiver Name and phone number for Inpatient discharge needs: Terry  Currently receive any HHC/aides/community supports: No     Post-op Transport: significant other  To/from hospital: significant other  To/from PT 2-3x/week: significant other  Uses community transport now: No     Outpatient Physical Therapy Site:  Site: [facility]  pre and post-op appts scheduled? Yes     Medication Management: self and pillbox  Preferred Pharm[facility] for Post-op Medications[facility]  Blood Management Vitamin Regimen:  starting 30 days prior to surgery  Post-op anticoagulant: to be determined by surgical team postoperatively  Has Bactroban for 5 days preop: Yes  Educated on Preoperative Bathing Instructions, and use of Soap for 5 days before surgery.      DC Plan: Pt plans to be discharged home      Barriers to DC identified preoperatively: none identified    BMI: 24.80    Patient Education:  Pt educated on post-op pain, early mobilization  (POD0), LOS goals, OP PT goals, and preoperative bathing. Patient educated that our goal is to appropriately discharge patient based off their post-op function while striving to maintain maximal independence. The goal is to discharge patient to home and for them to attend outpatient physical therapy.    Assigned to care team? Yes

## 2025-05-05 ENCOUNTER — APPOINTMENT (OUTPATIENT)
Dept: LAB | Facility: CLINIC | Age: 70
End: 2025-05-05
Payer: MEDICARE

## 2025-05-05 ENCOUNTER — EVALUATION (OUTPATIENT)
Dept: PHYSICAL THERAPY | Age: 70
End: 2025-05-05
Payer: MEDICARE

## 2025-05-05 DIAGNOSIS — M16.11 PRIMARY OSTEOARTHRITIS OF RIGHT HIP: Primary | ICD-10-CM

## 2025-05-05 DIAGNOSIS — M16.11 PRIMARY OSTEOARTHRITIS OF RIGHT HIP: ICD-10-CM

## 2025-05-05 LAB
ALBUMIN SERPL BCG-MCNC: 4.1 G/DL (ref 3.5–5)
ALP SERPL-CCNC: 61 U/L (ref 34–104)
ALT SERPL W P-5'-P-CCNC: 10 U/L (ref 7–52)
ANION GAP SERPL CALCULATED.3IONS-SCNC: 7 MMOL/L (ref 4–13)
APTT PPP: 28 SECONDS (ref 23–34)
AST SERPL W P-5'-P-CCNC: 14 U/L (ref 13–39)
BASOPHILS # BLD AUTO: 0.04 THOUSANDS/ÂΜL (ref 0–0.1)
BASOPHILS NFR BLD AUTO: 1 % (ref 0–1)
BILIRUB SERPL-MCNC: 0.44 MG/DL (ref 0.2–1)
BUN SERPL-MCNC: 18 MG/DL (ref 5–25)
CALCIUM SERPL-MCNC: 10.7 MG/DL (ref 8.4–10.2)
CHLORIDE SERPL-SCNC: 102 MMOL/L (ref 96–108)
CO2 SERPL-SCNC: 31 MMOL/L (ref 21–32)
CREAT SERPL-MCNC: 0.77 MG/DL (ref 0.6–1.3)
EOSINOPHIL # BLD AUTO: 0.23 THOUSAND/ÂΜL (ref 0–0.61)
EOSINOPHIL NFR BLD AUTO: 4 % (ref 0–6)
ERYTHROCYTE [DISTWIDTH] IN BLOOD BY AUTOMATED COUNT: 13.3 % (ref 11.6–15.1)
EST. AVERAGE GLUCOSE BLD GHB EST-MCNC: 117 MG/DL
GFR SERPL CREATININE-BSD FRML MDRD: 79 ML/MIN/1.73SQ M
GLUCOSE SERPL-MCNC: 83 MG/DL (ref 65–140)
HBA1C MFR BLD: 5.7 %
HCT VFR BLD AUTO: 45.3 % (ref 34.8–46.1)
HGB BLD-MCNC: 14.8 G/DL (ref 11.5–15.4)
IMM GRANULOCYTES # BLD AUTO: 0.02 THOUSAND/UL (ref 0–0.2)
IMM GRANULOCYTES NFR BLD AUTO: 0 % (ref 0–2)
INR PPP: 1.01 (ref 0.85–1.19)
LYMPHOCYTES # BLD AUTO: 1.67 THOUSANDS/ÂΜL (ref 0.6–4.47)
LYMPHOCYTES NFR BLD AUTO: 26 % (ref 14–44)
MCH RBC QN AUTO: 30.8 PG (ref 26.8–34.3)
MCHC RBC AUTO-ENTMCNC: 32.7 G/DL (ref 31.4–37.4)
MCV RBC AUTO: 94 FL (ref 82–98)
MONOCYTES # BLD AUTO: 0.7 THOUSAND/ÂΜL (ref 0.17–1.22)
MONOCYTES NFR BLD AUTO: 11 % (ref 4–12)
NEUTROPHILS # BLD AUTO: 3.9 THOUSANDS/ÂΜL (ref 1.85–7.62)
NEUTS SEG NFR BLD AUTO: 58 % (ref 43–75)
NRBC BLD AUTO-RTO: 0 /100 WBCS
PLATELET # BLD AUTO: 259 THOUSANDS/UL (ref 149–390)
PMV BLD AUTO: 11 FL (ref 8.9–12.7)
POTASSIUM SERPL-SCNC: 4.8 MMOL/L (ref 3.5–5.3)
PROT SERPL-MCNC: 7.2 G/DL (ref 6.4–8.4)
PROTHROMBIN TIME: 13.6 SECONDS (ref 12.3–15)
RBC # BLD AUTO: 4.8 MILLION/UL (ref 3.81–5.12)
SODIUM SERPL-SCNC: 140 MMOL/L (ref 135–147)
WBC # BLD AUTO: 6.56 THOUSAND/UL (ref 4.31–10.16)

## 2025-05-05 PROCEDURE — 85730 THROMBOPLASTIN TIME PARTIAL: CPT

## 2025-05-05 PROCEDURE — 85025 COMPLETE CBC W/AUTO DIFF WBC: CPT

## 2025-05-05 PROCEDURE — 87081 CULTURE SCREEN ONLY: CPT

## 2025-05-05 PROCEDURE — 97161 PT EVAL LOW COMPLEX 20 MIN: CPT

## 2025-05-05 PROCEDURE — 80053 COMPREHEN METABOLIC PANEL: CPT

## 2025-05-05 PROCEDURE — 97530 THERAPEUTIC ACTIVITIES: CPT

## 2025-05-05 PROCEDURE — 85610 PROTHROMBIN TIME: CPT

## 2025-05-05 PROCEDURE — 83036 HEMOGLOBIN GLYCOSYLATED A1C: CPT

## 2025-05-05 PROCEDURE — 97110 THERAPEUTIC EXERCISES: CPT

## 2025-05-05 PROCEDURE — 36415 COLL VENOUS BLD VENIPUNCTURE: CPT

## 2025-05-05 NOTE — PROGRESS NOTES
PT Evaluation     Today's date: 2025  Patient name: Debbi Major  : 1955  MRN: [id number]  Referring provider: Terry Alvarado DO  Dx:   Encounter Diagnosis     ICD-10-CM    1. Primary osteoarthritis of right hip  M16.11 Ambulatory referral to Physical Therapy          Start Time: 1100  Stop Time: 1200  Total time in clinic (min): 60 minutes    Assessment  Impairments: abnormal gait, abnormal muscle firing, abnormal or restricted ROM, activity intolerance, impaired physical strength, lacks appropriate home exercise program and pain with function  Symptom irritability: moderate    Assessment details: Risk Assessment and Prediction Tool results reviewed. Discussed DOS and patient's questions were answered. Mobility/ROM results objective findings. Strength results per above. Balance/Gait assessment (including Timed Up & Go) reviewed. Virtual Home Assessment was reviewed with patient. Home Preparation Checklist was reviewed with patient including identification of care partner and encouragement of single level set-up. Post-operative pain management expectations discussed. Post-operative gait training for level ground, stairs, and car transfers was performed. Patient demonstrated competence with immediate post-operative home exercise program.  Understanding of Dx/Px/POC: good     Prognosis: good    Goals  STGs  1) Pt will be able to ambulate with the least restrictive device to return to PLOF  2) Pt will increase AROM at the L/R hip to be able to complete ADLs/IADLs  3) Pt will demonstrate 100% competency of anterior/posterior hip restrictions to ensure appropriate healing and prevent dislocation  4) Pt will be able to ambulate stairs reciprocally      LTGs  1) Pt will improve TUG score from 13 seconds to less than 12 for safe community ambulation  2) Pt will demonstrate 100% competency of HEP to be appropriate for D/C from therapy after goals have been met, pt is functioning at PLOF, and/or the pt  displays significant improvement.    3) Pt will improve 5x sit to stand by 3-4 seconds to demonstrate increased LE strength      Plan  Patient would benefit from: skilled physical therapy  Referral necessary: No  Planned modality interventions: electrical stimulation/Russian stimulation    Planned therapy interventions: joint mobilization, ADL retraining, ADL training, manual therapy, balance, motor coordination training, strengthening, stretching, therapeutic exercise, home exercise program, therapeutic activities, therapeutic training, graded motor, graded exercise, graded activity, gait training, functional ROM exercises, flexibility, transfer training and IADL retraining    Frequency: 2-3x week  Duration in weeks: 10  Plan of Care beginning date: 2025  Plan of Care expiration date: 8/3/2025  Treatment plan discussed with: patient  Plan details: Pt was in agreement that they will be treated by myself in combination with a PTA. Further, informed that we work as a team, the PTAs follow the POC created by the PT, and I trust them to make safe and reasonable changes when appropriate under their scope of practice.     Pt was educated on the nature of their dx and the benefits of completing physical therapy. Additionally, pt is encouraged to ask questions regarding their dx and POC.           Subjective Evaluation    History of Present Illness  Mechanism of injury: Pt is having a R THR on 25              Recurrent probem    Quality of life: good    Patient Goals  Patient goals for therapy: decreased pain, improved balance, increased motion, increased strength, independence with ADLs/IADLs and return to sport/leisure activities    Pain  Current pain ratin  At best pain ratin  At worst pain ratin  Quality: dull ache and grinding  Relieving factors: medications  Aggravating factors: standing, walking, stair climbing and sitting  Progression: worsening    Social Support  Steps to enter house:  yes  Stairs in house: yes   Lives in: multiple-level home    Employment status: not working  Hand dominance: right  Exercise history: lawn maintenance      Diagnostic Tests  X-ray: abnormal        Objective     Active Range of Motion   Left Hip   Flexion: WFL and with pain  Internal rotation (90/90): 5 degrees with pain    Right Hip   Normal active range of motion    Functional Assessment        Comments  5/5/25  5 x sit to stand 18 seconds no UE     2 minute walk test: 13 seconds no AD               POC expires Unit limit Auth Expiration date PT/OT + Visit Limit?   5/4/25 BOMN  BOMN                           Visit/Unit Tracking  AUTH Status:  Date 5/5              No auth needed  Used 1                Remaining                 FOTO                     Precautions:       Manuals                                                                 Neuro Re-Ed                                                                                                        Ther Ex             Ankle pumps             Supine hip abd             Glute set             Qyuad set             LAQs                                                                                                                                                            Ther Activity                                       Gait Training                                       Modalities

## 2025-05-06 LAB — MRSA NOSE QL CULT: NORMAL

## 2025-05-06 NOTE — PRE-PROCEDURE INSTRUCTIONS
Pre-Surgery Instructions:   Medication Instructions    alendronate (FOSAMAX) 70 mg tablet Hold day of surgery.    ascorbic acid (VITAMIN C) 250 mg tablet Hold day of surgery.    atorvastatin (LIPITOR) 20 mg tablet Take night before surgery    Calcium Carbonate-Vit D-Min (CALCIUM 1200 PO) Hold day of surgery.    Cholecalciferol 50 MCG (2000 UT) CAPS Hold day of surgery.    ferrous sulfate 324 (65 Fe) mg Hold day of surgery.    fluticasone (FLONASE) 50 mcg/act nasal spray Uses PRN- OK to take day of surgery    folic acid (FOLVITE) 400 mcg tablet Hold day of surgery.    LORazepam (Ativan) 0.5 mg tablet Uses PRN- OK to take day of surgery    Multiple Vitamin (multivitamin) tablet Hold day of surgery.    mupirocin (BACTROBAN) 2 % ointment Instructions provided by MD    timolol (TIMOPTIC) 0.5 % ophthalmic solution Take day of surgery.    Wheat Dextrin (BENEFIBER PO) Hold day of surgery.   Ortho-bag contents reviewed. Advised to contact the surgeon's office with any questions/concerns.    Medication instructions for day of surgery reviewed. Please take all instructed medications with only a sip of water.       You will receive a call one business day prior to surgery with an arrival time and hospital directions. If your surgery is scheduled on a Monday, the hospital will be calling you on the Friday prior to your surgery. If you have not heard from anyone by 8pm, please call the hospital supervisor through the hospital  at [phone]. ([place] [phone] or [place] [phone]).    Do not eat or drink anything after midnight the night before your surgery, including candy, mints, lifesavers, or chewing gum. Do not drink alcohol 24hrs before your surgery. Try not to smoke at least 24hrs before your surgery.       Follow the pre surgery showering instructions as listed in the “My Surgical Experience Booklet” or otherwise provided by your surgeon's office. Do not use a blade to shave the surgical area 1 week  before surgery. It is okay to use a clean electric clippers up to 24 hours before surgery. Do not apply any lotions, creams, including makeup, cologne, deodorant, or perfumes after showering on the day of your surgery. Do not use dry shampoo, hair spray, hair gel, or any type of hair products.     No contact lenses, eye make-up, or artificial eyelashes. Remove nail polish, including gel polish, and any artificial, gel, or acrylic nails if possible. Remove all jewelry including rings and body piercing jewelry.     Wear causal clothing that is easy to take on and off. Consider your type of surgery.    Keep any valuables, jewelry, piercings at home. Please bring any specially ordered equipment (sling, braces) if indicated.    Arrange for a responsible person to drive you to and from the hospital on the day of your surgery. Please confirm the visitor policy for the day of your procedure when you receive your phone call with an arrival time.     Call the surgeon's office with any new illnesses, exposures, or additional questions prior to surgery.    Please reference your “My Surgical Experience Booklet” for additional information to prepare for your upcoming surgery.

## 2025-05-09 NOTE — PROGRESS NOTES
Pre-operative Clearance  Name: Debbi Major      : 1955      MRN: [id number]  Encounter Provider: Shraddha Mendoza DO  Encounter Date: 2025   Encounter department: [facility]    :  Assessment & Plan  Pre-op evaluation    Orders:  •  POCT ECG    Primary osteoarthritis of right hip    Orders:  •  Ambulatory referral to [facility]    Hypercalcemia    Orders:  •  Basic metabolic panel; Future        Pre-operative Clearance:     Revised Cardiac Risk Index:  RCI RISK CLASS I (0 risk factors, risk of major cardiac complications approximately 0.5%)    Clearance:  Patient is medically optimized (CLEARED) for proposed surgery without any additional cardiac testing.       Pending normalization of calcium -- pt to hold supplement and repeat level in 1 week     Medication Instructions:   - Benzodiazepines (ie, alprazolam, lorazepam, diazepam):  If the medication is needed, continue to take it on your normal schedule.  - Hyperlipidemia meds: Continue to take this medication on your normal schedule.       History of Present Illness     Pre-Op Examination     Surgery: R Hip Replacement   Anticipated Date of Surgery: 2025   Surgeon: Dr. Alvarado     Cr 0.77/GFR 79, LFTs WNL   Ca 10.7 -- takes multivitamin and calcium supplement   CBC benign   A1c 5.7%   In office EKG NSR       Previous history of bleeding disorders or clots?: No    Previous Anesthesia reaction?: No    Prolonged steroid use in the last 6 months?: No      Assessment of Cardiac Risk:   - Unstable or severe angina or MI in the last 6 weeks or history of stent placement in the last year?: No    - Decompensated heart failure (e.g. New onset heart failure, NYHA  Class IV heart failure, or worsening existing heart failure)?: No    - Significant arrhythmias such as high grade AV block, symptomatic ventricular arrhythmia, newly recognized ventricular tachycardia, supraventricular tachycardia with resting heart rate >100, or  symptomatic bradycardia?: No    - Severe heart valve disease including aortic stenosis or symptomatic mitral stenosis?: No      Pre-operative Risk Factors:  - Elevated-risk surgery: No    - History of cerebrovascular disease: No    - History of ischemic heart disease: No    - History of congestive heart failure: No    - Pre-operative treatment with insulin: No    - Pre-operative creatinine >2 mg/dL: No      Review of Systems   Constitutional:  Negative for chills and fever.   HENT:  Negative for congestion, ear pain, rhinorrhea and sore throat.    Eyes:  Negative for visual disturbance.   Respiratory:  Negative for cough and shortness of breath.    Cardiovascular:  Negative for chest pain, palpitations and leg swelling.   Gastrointestinal:  Negative for blood in stool.   Genitourinary:  Negative for hematuria.   Neurological:  Negative for dizziness and headaches.   Hematological:  Does not bruise/bleed easily.     Past Medical History   Past Medical History:   Diagnosis Date   • Colon polyp    • Depression    • Hyperlipidemia     last assessed - 2016   • Hyperparathyroidism (HCC) 2018   • Wears glasses      Past Surgical History:   Procedure Laterality Date   •  SECTION     • COLONOSCOPY     • [place] PARATHYROIDECTOMY/EXPLORATION PARATHYROIDS Right 2023    Procedure: MINIMALLY INVASIVE RIGHT PARATHYROIDECTOMY;  Surgeon: Davon Garrido MD;  Location: [facility];  Service: Surgical Oncology   • US GUIDED THYROID BIOPSY  2022     Family History   Problem Relation Age of Onset   • Cancer Mother    • Cancer Father    • Cancer Sister    • Breast cancer Sister    • No Known Problems Sister    • No Known Problems Sister    • No Known Problems Sister    • No Known Problems Sister    • No Known Problems Sister    • No Known Problems Sister    • No Known Problems Maternal Grandmother    • No Known Problems Paternal Grandmother    • No Known Problems Maternal Aunt    • No Known Problems Maternal  Aunt    • No Known Problems Maternal Aunt    • No Known Problems Paternal Aunt    • No Known Problems Paternal Aunt      Social History     Tobacco Use   • Smoking status: Every Day     Current packs/day: 0.50     Average packs/day: 0.5 packs/day for 30.0 years (15.0 ttl pk-yrs)     Types: Cigarettes   • Smokeless tobacco: Never   Vaping Use   • Vaping status: Never Used   Substance and Sexual Activity   • Alcohol use: No   • Drug use: No   • Sexual activity: Not on file     Current Outpatient Medications on File Prior to Visit   Medication Sig   • alendronate (FOSAMAX) 70 mg tablet TAKE 1 TABLET BY MOUTH EVERY 7 DAYS (Patient taking differently: wednesday)   • ascorbic acid (VITAMIN C) 250 mg tablet Take 2 tablets (500 mg total) by mouth daily   • atorvastatin (LIPITOR) 20 mg tablet TAKE ONE TABLET BY MOUTH DAILY (Patient taking differently: Take 20 mg by mouth in the evening. Take before meals)   • Calcium Carbonate-Vit D-Min (CALCIUM 1200 PO) Take by mouth   • Cholecalciferol 50 MCG (2000 UT) CAPS Take by mouth daily   • ferrous sulfate 324 (65 Fe) mg Take 1 tablet (324 mg total) by mouth 2 (two) times a day before meals   • fluticasone (FLONASE) 50 mcg/act nasal spray 2 sprays into each nostril if needed   • folic acid (FOLVITE) 400 mcg tablet Take 1 tablet (400 mcg total) by mouth daily   • LORazepam (Ativan) 0.5 mg tablet Take 1 tablet (0.5 mg total) by mouth every 8 (eight) hours as needed for anxiety   • Multiple Vitamin (multivitamin) capsule Take 1 capsule by mouth daily (Patient not taking: Reported on 5/6/2025)   • Multiple Vitamin (multivitamin) tablet Take 1 tablet by mouth daily   • mupirocin (BACTROBAN) 2 % ointment Apply 0.5 g to each nostril twice per day for 5 days prior to procedure   • timolol (TIMOPTIC) 0.5 % ophthalmic solution Administer 1 drop to both eyes 2 (two) times a day   • Wheat Dextrin (BENEFIBER PO) Take by mouth     Allergies   Allergen Reactions   • Sulfa Antibiotics Hives  "    Objective   /78   Pulse 55   Temp 97.9 °F (36.6 °C)   Ht 5' 3\" (1.6 m)   Wt 64.9 kg (143 lb)   SpO2 98%   BMI 25.33 kg/m²     Physical Exam  Vitals and nursing note reviewed.   Constitutional:       General: She is not in acute distress.     Appearance: She is well-developed.   HENT:      Head: Normocephalic and atraumatic.      Right Ear: Tympanic membrane, ear canal and external ear normal.      Left Ear: Tympanic membrane, ear canal and external ear normal.      Nose: Nose normal. No rhinorrhea.      Mouth/Throat:      Mouth: Mucous membranes are moist.      Pharynx: No oropharyngeal exudate or posterior oropharyngeal erythema.   Eyes:      Conjunctiva/sclera: Conjunctivae normal.   Cardiovascular:      Rate and Rhythm: Normal rate and regular rhythm.   Pulmonary:      Effort: Pulmonary effort is normal. No respiratory distress.      Breath sounds: Normal breath sounds.   Abdominal:      General: Bowel sounds are normal. There is no distension.      Palpations: Abdomen is soft.      Tenderness: There is no abdominal tenderness.   Musculoskeletal:      Right lower leg: No edema.      Left lower leg: No edema.   Lymphadenopathy:      Cervical: No cervical adenopathy.   Skin:     General: Skin is warm and dry.   Neurological:      Mental Status: She is alert.      Comments: Grossly intact   Psychiatric:         Mood and Affect: Mood normal.           Shraddha Mendoza,   "

## 2025-05-12 ENCOUNTER — CONSULT (OUTPATIENT)
Dept: FAMILY MEDICINE CLINIC | Facility: CLINIC | Age: 70
End: 2025-05-12
Attending: ORTHOPAEDIC SURGERY
Payer: MEDICARE

## 2025-05-12 VITALS
OXYGEN SATURATION: 98 % | SYSTOLIC BLOOD PRESSURE: 136 MMHG | DIASTOLIC BLOOD PRESSURE: 78 MMHG | BODY MASS INDEX: 25.34 KG/M2 | HEART RATE: 55 BPM | TEMPERATURE: 97.9 F | HEIGHT: 63 IN | WEIGHT: 143 LBS

## 2025-05-12 DIAGNOSIS — M16.11 PRIMARY OSTEOARTHRITIS OF RIGHT HIP: ICD-10-CM

## 2025-05-12 DIAGNOSIS — E83.52 HYPERCALCEMIA: ICD-10-CM

## 2025-05-12 DIAGNOSIS — Z01.818 PRE-OP EVALUATION: Primary | ICD-10-CM

## 2025-05-12 PROCEDURE — G2211 COMPLEX E/M VISIT ADD ON: HCPCS | Performed by: FAMILY MEDICINE

## 2025-05-12 PROCEDURE — 93000 ELECTROCARDIOGRAM COMPLETE: CPT | Performed by: FAMILY MEDICINE

## 2025-05-12 PROCEDURE — 99214 OFFICE O/P EST MOD 30 MIN: CPT | Performed by: FAMILY MEDICINE

## 2025-05-12 NOTE — LETTER
May 12, 2025     Terry Alvarado DO  [address] [facility]  [address] [place], [place] [address]    Patient: Debbi Major   YOB: 1955   Date of Visit: 2025       Dear Dr. Terry Alvarado, :    Thank you for referring Debbi Major to me for evaluation. Below are my notes for this consultation.    If you have questions, please do not hesitate to call me. I look forward to following your patient along with you.         Sincerely,        Shraddha Mendoza DO        CC: No Recipients    Shraddha Mendoza DO  2025  2:23 PM  Sign when Signing Visit  Pre-operative Clearance  Name: Debbi Major      : 1955      MRN: [id number]  Encounter Provider: Shraddha Mendoza DO  Encounter Date: 2025   Encounter department: [facility]    :  Assessment & Plan  Pre-op evaluation    Orders:  •  POCT ECG    Primary osteoarthritis of right hip    Orders:  •  Ambulatory referral to [facility]    Hypercalcemia    Orders:  •  Basic metabolic panel; Future        Pre-operative Clearance:     Revised Cardiac Risk Index:  RCI RISK CLASS I (0 risk factors, risk of major cardiac complications approximately 0.5%)    Clearance:  Patient is medically optimized (CLEARED) for proposed surgery without any additional cardiac testing.       Pending normalization of calcium -- pt to hold supplement and repeat level in 1 week     Medication Instructions:   - Benzodiazepines (ie, alprazolam, lorazepam, diazepam):  If the medication is needed, continue to take it on your normal schedule.  - Hyperlipidemia meds: Continue to take this medication on your normal schedule.       History of Present Illness    Pre-Op Examination     Surgery: R Hip Replacement   Anticipated Date of Surgery: 2025   Surgeon: Dr. Alvarado     Cr 0.77/GFR 79, LFTs WNL   Ca 10.7 -- takes multivitamin and calcium supplement   CBC benign   A1c 5.7%        Previous history of bleeding disorders or clots?: No    Previous  Anesthesia reaction?: No    Prolonged steroid use in the last 6 months?: No      Assessment of Cardiac Risk:   - Unstable or severe angina or MI in the last 6 weeks or history of stent placement in the last year?: No    - Decompensated heart failure (e.g. New onset heart failure, NYHA  Class IV heart failure, or worsening existing heart failure)?: No    - Significant arrhythmias such as high grade AV block, symptomatic ventricular arrhythmia, newly recognized ventricular tachycardia, supraventricular tachycardia with resting heart rate >100, or symptomatic bradycardia?: No    - Severe heart valve disease including aortic stenosis or symptomatic mitral stenosis?: No      Pre-operative Risk Factors:  - Elevated-risk surgery: No    - History of cerebrovascular disease: No    - History of ischemic heart disease: No    - History of congestive heart failure: No    - Pre-operative treatment with insulin: No    - Pre-operative creatinine >2 mg/dL: No      Review of Systems   Constitutional:  Negative for chills and fever.   HENT:  Negative for congestion, ear pain, rhinorrhea and sore throat.    Eyes:  Negative for visual disturbance.   Respiratory:  Negative for cough and shortness of breath.    Cardiovascular:  Negative for chest pain, palpitations and leg swelling.   Gastrointestinal:  Negative for blood in stool.   Genitourinary:  Negative for hematuria.   Neurological:  Negative for dizziness and headaches.   Hematological:  Does not bruise/bleed easily.     Past Medical History  Past Medical History:   Diagnosis Date   • Colon polyp    • Depression    • Hyperlipidemia     last assessed - 2016   • Hyperparathyroidism (HCC) 2018   • Wears glasses      Past Surgical History:   Procedure Laterality Date   •  SECTION     • COLONOSCOPY     • [place] PARATHYROIDECTOMY/EXPLORATION PARATHYROIDS Right 2023    Procedure: MINIMALLY INVASIVE RIGHT PARATHYROIDECTOMY;  Surgeon: Davon Garrido MD;  Location:   MAIN OR;  Service: Surgical Oncology   • US GUIDED THYROID BIOPSY  06/09/2022     Family History   Problem Relation Age of Onset   • Cancer Mother    • Cancer Father    • Cancer Sister    • Breast cancer Sister    • No Known Problems Sister    • No Known Problems Sister    • No Known Problems Sister    • No Known Problems Sister    • No Known Problems Sister    • No Known Problems Sister    • No Known Problems Maternal Grandmother    • No Known Problems Paternal Grandmother    • No Known Problems Maternal Aunt    • No Known Problems Maternal Aunt    • No Known Problems Maternal Aunt    • No Known Problems Paternal Aunt    • No Known Problems Paternal Aunt      Social History     Tobacco Use   • Smoking status: Every Day     Current packs/day: 0.50     Average packs/day: 0.5 packs/day for 30.0 years (15.0 ttl pk-yrs)     Types: Cigarettes   • Smokeless tobacco: Never   Vaping Use   • Vaping status: Never Used   Substance and Sexual Activity   • Alcohol use: No   • Drug use: No   • Sexual activity: Not on file     Current Outpatient Medications on File Prior to Visit   Medication Sig   • alendronate (FOSAMAX) 70 mg tablet TAKE 1 TABLET BY MOUTH EVERY 7 DAYS (Patient taking differently: wednesday)   • ascorbic acid (VITAMIN C) 250 mg tablet Take 2 tablets (500 mg total) by mouth daily   • atorvastatin (LIPITOR) 20 mg tablet TAKE ONE TABLET BY MOUTH DAILY (Patient taking differently: Take 20 mg by mouth in the evening. Take before meals)   • Calcium Carbonate-Vit D-Min (CALCIUM 1200 PO) Take by mouth   • Cholecalciferol 50 MCG (2000 UT) CAPS Take by mouth daily   • ferrous sulfate 324 (65 Fe) mg Take 1 tablet (324 mg total) by mouth 2 (two) times a day before meals   • fluticasone (FLONASE) 50 mcg/act nasal spray 2 sprays into each nostril if needed   • folic acid (FOLVITE) 400 mcg tablet Take 1 tablet (400 mcg total) by mouth daily   • LORazepam (Ativan) 0.5 mg tablet Take 1 tablet (0.5 mg total) by mouth every 8  "(eight) hours as needed for anxiety   • Multiple Vitamin (multivitamin) capsule Take 1 capsule by mouth daily (Patient not taking: Reported on 5/6/2025)   • Multiple Vitamin (multivitamin) tablet Take 1 tablet by mouth daily   • mupirocin (BACTROBAN) 2 % ointment Apply 0.5 g to each nostril twice per day for 5 days prior to procedure   • timolol (TIMOPTIC) 0.5 % ophthalmic solution Administer 1 drop to both eyes 2 (two) times a day   • Wheat Dextrin (BENEFIBER PO) Take by mouth     Allergies   Allergen Reactions   • Sulfa Antibiotics Hives     Objective  /78   Pulse 55   Temp 97.9 °F (36.6 °C)   Ht 5' 3\" (1.6 m)   Wt 64.9 kg (143 lb)   SpO2 98%   BMI 25.33 kg/m²     Physical Exam  Vitals and nursing note reviewed.   Constitutional:       General: She is not in acute distress.     Appearance: She is well-developed.   HENT:      Head: Normocephalic and atraumatic.      Right Ear: Tympanic membrane, ear canal and external ear normal.      Left Ear: Tympanic membrane, ear canal and external ear normal.      Nose: Nose normal. No rhinorrhea.      Mouth/Throat:      Mouth: Mucous membranes are moist.      Pharynx: No oropharyngeal exudate or posterior oropharyngeal erythema.   Eyes:      Conjunctiva/sclera: Conjunctivae normal.   Cardiovascular:      Rate and Rhythm: Normal rate and regular rhythm.   Pulmonary:      Effort: Pulmonary effort is normal. No respiratory distress.      Breath sounds: Normal breath sounds.   Abdominal:      General: Bowel sounds are normal. There is no distension.      Palpations: Abdomen is soft.      Tenderness: There is no abdominal tenderness.   Musculoskeletal:      Right lower leg: No edema.      Left lower leg: No edema.   Lymphadenopathy:      Cervical: No cervical adenopathy.   Skin:     General: Skin is warm and dry.   Neurological:      Mental Status: She is alert.      Comments: Grossly intact   Psychiatric:         Mood and Affect: Mood normal.           Shraddha" Kelly DO

## 2025-05-15 ENCOUNTER — TELEPHONE (OUTPATIENT)
Age: 70
End: 2025-05-15

## 2025-05-15 DIAGNOSIS — M81.8 OTHER OSTEOPOROSIS WITHOUT CURRENT PATHOLOGICAL FRACTURE: ICD-10-CM

## 2025-05-16 RX ORDER — ALENDRONATE SODIUM 70 MG/1
TABLET ORAL
Qty: 12 TABLET | Refills: 1 | Status: SHIPPED | OUTPATIENT
Start: 2025-05-16

## 2025-05-21 ENCOUNTER — TELEPHONE (OUTPATIENT)
Dept: OBGYN CLINIC | Facility: CLINIC | Age: 70
End: 2025-05-21

## 2025-05-21 NOTE — TELEPHONE ENCOUNTER
Patient called stating she received a VM that she can cancel her chelle op appointment scheduled on 5/22/25 but no return # was left so she called me. I explained to her I would need to look into this and would contact her back this afternoon whether the Chelle Op appointment could be cx'd or not.  I do not see any telephone note for this nor do I have dept number and her Chelle Op visit isnt cancelled.  Return call to cell

## 2025-05-21 NOTE — TELEPHONE ENCOUNTER
Patient has been informed; Bel Op Appointment and referral have been cancelled.     I understand and appreciate the fast response.

## 2025-05-22 ENCOUNTER — TELEPHONE (OUTPATIENT)
Age: 70
End: 2025-05-22

## 2025-05-22 NOTE — TELEPHONE ENCOUNTER
LVM advising patient to complete outstanding patient IQ tasks. Left my direct # and the main # if there are any questions.

## 2025-05-23 ENCOUNTER — APPOINTMENT (OUTPATIENT)
Dept: LAB | Facility: CLINIC | Age: 70
End: 2025-05-23
Attending: FAMILY MEDICINE
Payer: MEDICARE

## 2025-05-23 DIAGNOSIS — E83.52 HYPERCALCEMIA: ICD-10-CM

## 2025-05-23 PROCEDURE — 80048 BASIC METABOLIC PNL TOTAL CA: CPT

## 2025-05-23 PROCEDURE — 36415 COLL VENOUS BLD VENIPUNCTURE: CPT

## 2025-05-24 LAB
ANION GAP SERPL CALCULATED.3IONS-SCNC: 8 MMOL/L (ref 4–13)
BUN SERPL-MCNC: 16 MG/DL (ref 5–25)
CALCIUM SERPL-MCNC: 10.2 MG/DL (ref 8.4–10.2)
CHLORIDE SERPL-SCNC: 104 MMOL/L (ref 96–108)
CO2 SERPL-SCNC: 31 MMOL/L (ref 21–32)
CREAT SERPL-MCNC: 0.73 MG/DL (ref 0.6–1.3)
GFR SERPL CREATININE-BSD FRML MDRD: 84 ML/MIN/1.73SQ M
GLUCOSE P FAST SERPL-MCNC: 76 MG/DL (ref 65–99)
POTASSIUM SERPL-SCNC: 4.3 MMOL/L (ref 3.5–5.3)
SODIUM SERPL-SCNC: 143 MMOL/L (ref 135–147)

## 2025-05-26 ENCOUNTER — RESULTS FOLLOW-UP (OUTPATIENT)
Dept: FAMILY MEDICINE CLINIC | Facility: CLINIC | Age: 70
End: 2025-05-26

## 2025-05-27 NOTE — TELEPHONE ENCOUNTER
Patient called, results message was relayed to patient. Patient does not have any questions or concerns at this time.

## 2025-06-03 PROBLEM — M16.11 PRIMARY OSTEOARTHRITIS OF RIGHT HIP: Status: ACTIVE | Noted: 2025-06-03

## 2025-06-03 PROCEDURE — NC001 PR NO CHARGE: Performed by: ORTHOPAEDIC SURGERY

## 2025-06-03 NOTE — H&P
H&P - Orthopedics   Name: Debbi Major 70 y.o. female I MRN: [id number]  Unit/Bed#:  I Date of Admission: (Not on file)   Date of Service: 6/3/2025 I Hospital Day: 0     Assessment & Plan  Primary osteoarthritis of right hip  Elective right total hip replacement    History of Present Illness   HPI: Debbi Major is a 70 y.o. year old female who presented to our office complaining of pain along her right hip and groin with ambulatory dysfunction.  X-rays of the patient's right hip were performed which are consistent with advanced arthritic changes.  The patient stated that her symptoms were continuing to worsen starting to affect her quality of life.  After exhausting conservative treatment, the risks and benefits of surgery were discussed with the patient.  She decided on an elective right total hip replacement.    Review of Systems   Constitutional:  Negative for chills, fever and unexpected weight change.   HENT:  Negative for nosebleeds and sore throat.    Eyes:  Negative for pain, redness and visual disturbance.   Respiratory:  Negative for cough, shortness of breath and wheezing.    Cardiovascular:  Negative for chest pain, palpitations and leg swelling.   Gastrointestinal:  Negative for abdominal pain, nausea and vomiting.   Endocrine: Negative for polydipsia and polyuria.   Genitourinary:  Negative for dysuria and hematuria.   Musculoskeletal:  Positive for arthralgias, gait problem and myalgias.        As noted in HPI   Skin:  Negative for rash and wound.   Neurological:  Negative for dizziness, numbness and headaches.   Psychiatric/Behavioral:  Negative for decreased concentration and suicidal ideas. The patient is not nervous/anxious.     significant for findings described in the HPI.  Historical Information   Past Medical History[1]  Past Surgical History[2]  Social History[3]  E-Cigarette/Vaping    E-Cigarette Use Never User      E-Cigarette/Vaping Substances    Nicotine No     THC No     CBD No      "Flavoring No     Other No     Unknown No      Family history non-contributory    Objective :     Physical ExamOrtho Exam   Right lower extremity is neurovascularly intact  Toes are pink and mobile  Compartments are soft  Pain along the hip and groin  There is virtually no rotation of the hip  Brisk cap refill  Sensation intact  Cardiovascular: Normal rate    Pulmonary: Effort normal  Abdomen: Soft, nontender, nondistended     Lab Results: I have reviewed the following results:   No results for input(s): \"WBC\", \"HGB\", \"HCT\", \"PLT\", \"BANDSPCT\", \"BUN\", \"CREATININE\", \"PTT\", \"INR\", \"ESR\", \"CRP\" in the last 72 hours.  Blood Culture: No results found for: \"BLOODCX\"  Wound Culture: No results found for: \"WOUNDCULT\"    Imaging Results Review: I personally reviewed the following image studies in PACS and associated radiology reports: X-ray right hip. My interpretation of the radiology images/reports is: Advanced arthritic changes.  Other Study Results Review: No additional pertinent studies reviewed.       [1]   Past Medical History:  Diagnosis Date    Colon polyp     Depression     Hyperlipidemia     last assessed - 2016    Hyperparathyroidism (HCC) 2018    Wears glasses    [2]   Past Surgical History:  Procedure Laterality Date     SECTION      COLONOSCOPY      [place] PARATHYROIDECTOMY/EXPLORATION PARATHYROIDS Right 2023    Procedure: MINIMALLY INVASIVE RIGHT PARATHYROIDECTOMY;  Surgeon: Davon Garrido MD;  Location: [facility];  Service: Surgical Oncology    US GUIDED THYROID BIOPSY  2022   [3]   Social History  Tobacco Use    Smoking status: Every Day     Current packs/day: 0.50     Average packs/day: 0.5 packs/day for 30.0 years (15.0 ttl pk-yrs)     Types: Cigarettes    Smokeless tobacco: Never   Vaping Use    Vaping status: Never Used   Substance and Sexual Activity    Alcohol use: No    Drug use: No     "

## 2025-06-04 ENCOUNTER — ANESTHESIA (OUTPATIENT)
Dept: PERIOP | Facility: HOSPITAL | Age: 70
End: 2025-06-04
Payer: MEDICARE

## 2025-06-04 ENCOUNTER — HOSPITAL ENCOUNTER (OUTPATIENT)
Facility: HOSPITAL | Age: 70
Setting detail: OUTPATIENT SURGERY
Discharge: HOME | End: 2025-06-05
Attending: ORTHOPAEDIC SURGERY | Admitting: ORTHOPAEDIC SURGERY
Payer: MEDICARE

## 2025-06-04 ENCOUNTER — APPOINTMENT (OUTPATIENT)
Dept: RADIOLOGY | Facility: HOSPITAL | Age: 70
End: 2025-06-04
Payer: MEDICARE

## 2025-06-04 ENCOUNTER — ANESTHESIA EVENT (OUTPATIENT)
Dept: PERIOP | Facility: HOSPITAL | Age: 70
End: 2025-06-04
Payer: MEDICARE

## 2025-06-04 DIAGNOSIS — M16.11 PRIMARY OSTEOARTHRITIS OF RIGHT HIP: ICD-10-CM

## 2025-06-04 PROCEDURE — 97162 PT EVAL MOD COMPLEX 30 MIN: CPT

## 2025-06-04 PROCEDURE — 88304 TISSUE EXAM BY PATHOLOGIST: CPT | Performed by: PATHOLOGY

## 2025-06-04 PROCEDURE — C1776 JOINT DEVICE (IMPLANTABLE): HCPCS | Performed by: ORTHOPAEDIC SURGERY

## 2025-06-04 PROCEDURE — 88311 DECALCIFY TISSUE: CPT | Performed by: PATHOLOGY

## 2025-06-04 PROCEDURE — 73501 X-RAY EXAM HIP UNI 1 VIEW: CPT

## 2025-06-04 PROCEDURE — 27130 TOTAL HIP ARTHROPLASTY: CPT | Performed by: ORTHOPAEDIC SURGERY

## 2025-06-04 PROCEDURE — C1713 ANCHOR/SCREW BN/BN,TIS/BN: HCPCS | Performed by: ORTHOPAEDIC SURGERY

## 2025-06-04 PROCEDURE — 27130 TOTAL HIP ARTHROPLASTY: CPT | Performed by: PHYSICIAN ASSISTANT

## 2025-06-04 DEVICE — ACTIS DUOFIX HIP PROSTHESIS (FEMORAL STEM 12/14 TAPER CEMENTLESS SIZE 3 HIGH COLLAR)  CE
Type: IMPLANTABLE DEVICE | Site: HIP | Status: FUNCTIONAL
Brand: ACTIS

## 2025-06-04 DEVICE — PINNACLE CANCELLOUS BONE SCREW 6.5MM X 35MM
Type: IMPLANTABLE DEVICE | Site: HIP | Status: FUNCTIONAL
Brand: PINNACLE

## 2025-06-04 DEVICE — PINNACLE GRIPTION ACETABULAR SHELL MULTI-HOLE 54MM OD
Type: IMPLANTABLE DEVICE | Site: HIP | Status: FUNCTIONAL
Brand: PINNACLE GRIPTION

## 2025-06-04 DEVICE — BIOLOX DELTA CERAMIC FEMORAL HEAD +5.0 36MM DIA 12/14 TAPER
Type: IMPLANTABLE DEVICE | Site: HIP | Status: FUNCTIONAL
Brand: BIOLOX DELTA

## 2025-06-04 DEVICE — PINNACLE HIP SOLUTIONS ALTRX POLYETHYLENE ACETABULAR LINER NEUTRAL 36MM ID 54MM OD
Type: IMPLANTABLE DEVICE | Site: HIP | Status: FUNCTIONAL
Brand: PINNACLE ALTRX

## 2025-06-04 RX ORDER — FERROUS SULFATE 325(65) MG
325 TABLET ORAL 2 TIMES DAILY WITH MEALS
Status: DISCONTINUED | OUTPATIENT
Start: 2025-06-04 | End: 2025-06-05 | Stop reason: HOSPADM

## 2025-06-04 RX ORDER — DOCUSATE SODIUM 100 MG/1
100 CAPSULE, LIQUID FILLED ORAL 2 TIMES DAILY
Status: DISCONTINUED | OUTPATIENT
Start: 2025-06-04 | End: 2025-06-05 | Stop reason: HOSPADM

## 2025-06-04 RX ORDER — TRANEXAMIC ACID 10 MG/ML
INJECTION, SOLUTION INTRAVENOUS AS NEEDED
Status: DISCONTINUED | OUTPATIENT
Start: 2025-06-04 | End: 2025-06-04

## 2025-06-04 RX ORDER — TIMOLOL MALEATE 5 MG/ML
1 SOLUTION/ DROPS OPHTHALMIC 2 TIMES DAILY
Status: DISCONTINUED | OUTPATIENT
Start: 2025-06-04 | End: 2025-06-05 | Stop reason: HOSPADM

## 2025-06-04 RX ORDER — OXYCODONE HYDROCHLORIDE 10 MG/1
10 TABLET ORAL EVERY 6 HOURS PRN
Status: DISCONTINUED | OUTPATIENT
Start: 2025-06-04 | End: 2025-06-05 | Stop reason: HOSPADM

## 2025-06-04 RX ORDER — LORAZEPAM 0.5 MG/1
0.5 TABLET ORAL EVERY 8 HOURS PRN
Status: DISCONTINUED | OUTPATIENT
Start: 2025-06-04 | End: 2025-06-05 | Stop reason: HOSPADM

## 2025-06-04 RX ORDER — ASCORBIC ACID 500 MG
500 TABLET ORAL 2 TIMES DAILY
Status: DISCONTINUED | OUTPATIENT
Start: 2025-06-04 | End: 2025-06-05 | Stop reason: HOSPADM

## 2025-06-04 RX ORDER — CHLORHEXIDINE GLUCONATE 40 MG/ML
SOLUTION TOPICAL DAILY PRN
Status: DISCONTINUED | OUTPATIENT
Start: 2025-06-04 | End: 2025-06-04

## 2025-06-04 RX ORDER — SODIUM CHLORIDE, SODIUM LACTATE, POTASSIUM CHLORIDE, CALCIUM CHLORIDE 600; 310; 30; 20 MG/100ML; MG/100ML; MG/100ML; MG/100ML
20 INJECTION, SOLUTION INTRAVENOUS CONTINUOUS
Status: DISCONTINUED | OUTPATIENT
Start: 2025-06-04 | End: 2025-06-05 | Stop reason: HOSPADM

## 2025-06-04 RX ORDER — OXYCODONE HYDROCHLORIDE 5 MG/1
5 TABLET ORAL EVERY 4 HOURS PRN
Status: DISCONTINUED | OUTPATIENT
Start: 2025-06-04 | End: 2025-06-05 | Stop reason: HOSPADM

## 2025-06-04 RX ORDER — SODIUM CHLORIDE, SODIUM LACTATE, POTASSIUM CHLORIDE, CALCIUM CHLORIDE 600; 310; 30; 20 MG/100ML; MG/100ML; MG/100ML; MG/100ML
50 INJECTION, SOLUTION INTRAVENOUS CONTINUOUS
Status: DISCONTINUED | OUTPATIENT
Start: 2025-06-04 | End: 2025-06-04

## 2025-06-04 RX ORDER — ONDANSETRON 2 MG/ML
INJECTION INTRAMUSCULAR; INTRAVENOUS AS NEEDED
Status: DISCONTINUED | OUTPATIENT
Start: 2025-06-04 | End: 2025-06-04

## 2025-06-04 RX ORDER — HYDROMORPHONE HCL/PF 1 MG/ML
0.5 SYRINGE (ML) INJECTION
Status: DISCONTINUED | OUTPATIENT
Start: 2025-06-04 | End: 2025-06-05 | Stop reason: HOSPADM

## 2025-06-04 RX ORDER — MAGNESIUM HYDROXIDE/ALUMINUM HYDROXICE/SIMETHICONE 120; 1200; 1200 MG/30ML; MG/30ML; MG/30ML
30 SUSPENSION ORAL EVERY 6 HOURS PRN
Status: DISCONTINUED | OUTPATIENT
Start: 2025-06-04 | End: 2025-06-05 | Stop reason: HOSPADM

## 2025-06-04 RX ORDER — ALENDRONATE SODIUM 70 MG/1
70 TABLET ORAL
Status: DISCONTINUED | OUTPATIENT
Start: 2025-06-05 | End: 2025-06-04

## 2025-06-04 RX ORDER — CEFAZOLIN SODIUM 2 G/50ML
2000 SOLUTION INTRAVENOUS ONCE
Status: COMPLETED | OUTPATIENT
Start: 2025-06-04 | End: 2025-06-04

## 2025-06-04 RX ORDER — POVIDONE-IODINE 100 MG/G
OINTMENT TOPICAL AS NEEDED
Status: DISCONTINUED | OUTPATIENT
Start: 2025-06-04 | End: 2025-06-04 | Stop reason: HOSPADM

## 2025-06-04 RX ORDER — ONDANSETRON 2 MG/ML
4 INJECTION INTRAMUSCULAR; INTRAVENOUS EVERY 6 HOURS PRN
Status: DISCONTINUED | OUTPATIENT
Start: 2025-06-04 | End: 2025-06-05 | Stop reason: HOSPADM

## 2025-06-04 RX ORDER — ONDANSETRON 2 MG/ML
4 INJECTION INTRAMUSCULAR; INTRAVENOUS ONCE AS NEEDED
Status: DISCONTINUED | OUTPATIENT
Start: 2025-06-04 | End: 2025-06-04 | Stop reason: HOSPADM

## 2025-06-04 RX ORDER — FENTANYL CITRATE 50 UG/ML
INJECTION, SOLUTION INTRAMUSCULAR; INTRAVENOUS AS NEEDED
Status: DISCONTINUED | OUTPATIENT
Start: 2025-06-04 | End: 2025-06-04

## 2025-06-04 RX ORDER — BUPIVACAINE HYDROCHLORIDE 7.5 MG/ML
INJECTION, SOLUTION INTRASPINAL AS NEEDED
Status: DISCONTINUED | OUTPATIENT
Start: 2025-06-04 | End: 2025-06-04

## 2025-06-04 RX ORDER — ATORVASTATIN CALCIUM 20 MG/1
20 TABLET, FILM COATED ORAL
Status: DISCONTINUED | OUTPATIENT
Start: 2025-06-04 | End: 2025-06-05 | Stop reason: HOSPADM

## 2025-06-04 RX ORDER — CHLORHEXIDINE GLUCONATE ORAL RINSE 1.2 MG/ML
15 SOLUTION DENTAL ONCE
Status: COMPLETED | OUTPATIENT
Start: 2025-06-04 | End: 2025-06-04

## 2025-06-04 RX ORDER — FLUTICASONE PROPIONATE 50 MCG
2 SPRAY, SUSPENSION (ML) NASAL DAILY
Status: DISCONTINUED | OUTPATIENT
Start: 2025-06-04 | End: 2025-06-05 | Stop reason: HOSPADM

## 2025-06-04 RX ORDER — HYDROMORPHONE HCL IN WATER/PF 6 MG/30 ML
0.2 PATIENT CONTROLLED ANALGESIA SYRINGE INTRAVENOUS
Status: DISCONTINUED | OUTPATIENT
Start: 2025-06-04 | End: 2025-06-04 | Stop reason: HOSPADM

## 2025-06-04 RX ORDER — FONDAPARINUX SODIUM 2.5 MG/.5ML
2.5 INJECTION SUBCUTANEOUS EVERY 24 HOURS
Status: DISCONTINUED | OUTPATIENT
Start: 2025-06-05 | End: 2025-06-05 | Stop reason: HOSPADM

## 2025-06-04 RX ORDER — ACETAMINOPHEN 325 MG/1
975 TABLET ORAL EVERY 8 HOURS
Status: DISCONTINUED | OUTPATIENT
Start: 2025-06-04 | End: 2025-06-05 | Stop reason: HOSPADM

## 2025-06-04 RX ORDER — CEFAZOLIN SODIUM 1 G/50ML
1000 SOLUTION INTRAVENOUS EVERY 8 HOURS
Status: COMPLETED | OUTPATIENT
Start: 2025-06-04 | End: 2025-06-05

## 2025-06-04 RX ORDER — CALCIUM CARBONATE 500(1250)
2 TABLET ORAL
Status: DISCONTINUED | OUTPATIENT
Start: 2025-06-05 | End: 2025-06-05 | Stop reason: HOSPADM

## 2025-06-04 RX ORDER — DEXAMETHASONE SODIUM PHOSPHATE 10 MG/ML
INJECTION, SOLUTION INTRAMUSCULAR; INTRAVENOUS AS NEEDED
Status: DISCONTINUED | OUTPATIENT
Start: 2025-06-04 | End: 2025-06-04

## 2025-06-04 RX ORDER — PROPOFOL 10 MG/ML
INJECTION, EMULSION INTRAVENOUS CONTINUOUS PRN
Status: DISCONTINUED | OUTPATIENT
Start: 2025-06-04 | End: 2025-06-04

## 2025-06-04 RX ORDER — FOLIC ACID 1 MG/1
1 TABLET ORAL DAILY
Status: DISCONTINUED | OUTPATIENT
Start: 2025-06-04 | End: 2025-06-05 | Stop reason: HOSPADM

## 2025-06-04 RX ORDER — MIDAZOLAM HYDROCHLORIDE 2 MG/2ML
INJECTION, SOLUTION INTRAMUSCULAR; INTRAVENOUS AS NEEDED
Status: DISCONTINUED | OUTPATIENT
Start: 2025-06-04 | End: 2025-06-04

## 2025-06-04 RX ORDER — PHENYLEPHRINE HCL IN 0.9% NACL 1 MG/10 ML
SYRINGE (ML) INTRAVENOUS AS NEEDED
Status: DISCONTINUED | OUTPATIENT
Start: 2025-06-04 | End: 2025-06-04

## 2025-06-04 RX ADMIN — PHENYLEPHRINE HYDROCHLORIDE 20 MCG/MIN: 10 INJECTION INTRAVENOUS at 10:59

## 2025-06-04 RX ADMIN — CEFAZOLIN SODIUM 2000 MG: 2 SOLUTION INTRAVENOUS at 10:33

## 2025-06-04 RX ADMIN — BUPIVACAINE HYDROCHLORIDE IN DEXTROSE 1.6 ML: 7.5 INJECTION, SOLUTION SUBARACHNOID at 10:31

## 2025-06-04 RX ADMIN — ONDANSETRON 4 MG: 2 INJECTION INTRAMUSCULAR; INTRAVENOUS at 12:02

## 2025-06-04 RX ADMIN — HYDROMORPHONE HYDROCHLORIDE 0.2 MG: 0.2 INJECTION, SOLUTION INTRAMUSCULAR; INTRAVENOUS; SUBCUTANEOUS at 12:55

## 2025-06-04 RX ADMIN — ATORVASTATIN CALCIUM 20 MG: 20 TABLET, FILM COATED ORAL at 17:45

## 2025-06-04 RX ADMIN — Medication 2000 UNITS: at 15:26

## 2025-06-04 RX ADMIN — OXYCODONE HYDROCHLORIDE 10 MG: 10 TABLET ORAL at 15:34

## 2025-06-04 RX ADMIN — CHLORHEXIDINE GLUCONATE 15 ML: 1.2 SOLUTION ORAL at 08:58

## 2025-06-04 RX ADMIN — FENTANYL CITRATE 100 MCG: 50 INJECTION INTRAMUSCULAR; INTRAVENOUS at 10:18

## 2025-06-04 RX ADMIN — FOLIC ACID 1 MG: 1 TABLET ORAL at 15:28

## 2025-06-04 RX ADMIN — SODIUM CHLORIDE, SODIUM LACTATE, POTASSIUM CHLORIDE, AND CALCIUM CHLORIDE: .6; .31; .03; .02 INJECTION, SOLUTION INTRAVENOUS at 11:43

## 2025-06-04 RX ADMIN — CEFAZOLIN SODIUM 1000 MG: 1 SOLUTION INTRAVENOUS at 17:45

## 2025-06-04 RX ADMIN — TIMOLOL MALEATE 1 DROP: 5 SOLUTION/ DROPS OPHTHALMIC at 15:30

## 2025-06-04 RX ADMIN — HYDROMORPHONE HYDROCHLORIDE 0.2 MG: 0.2 INJECTION, SOLUTION INTRAMUSCULAR; INTRAVENOUS; SUBCUTANEOUS at 13:06

## 2025-06-04 RX ADMIN — SODIUM CHLORIDE, SODIUM LACTATE, POTASSIUM CHLORIDE, AND CALCIUM CHLORIDE 20 ML/HR: .6; .31; .03; .02 INJECTION, SOLUTION INTRAVENOUS at 13:22

## 2025-06-04 RX ADMIN — HYDROMORPHONE HYDROCHLORIDE 0.5 MG: 1 INJECTION, SOLUTION INTRAMUSCULAR; INTRAVENOUS; SUBCUTANEOUS at 20:55

## 2025-06-04 RX ADMIN — SODIUM CHLORIDE, SODIUM LACTATE, POTASSIUM CHLORIDE, AND CALCIUM CHLORIDE 50 ML/HR: .6; .31; .03; .02 INJECTION, SOLUTION INTRAVENOUS at 08:58

## 2025-06-04 RX ADMIN — TIMOLOL MALEATE 1 DROP: 5 SOLUTION/ DROPS OPHTHALMIC at 20:58

## 2025-06-04 RX ADMIN — PROPOFOL 80 MCG/KG/MIN: 10 INJECTION, EMULSION INTRAVENOUS at 10:35

## 2025-06-04 RX ADMIN — Medication 100 MCG: at 10:54

## 2025-06-04 RX ADMIN — DEXAMETHASONE SODIUM PHOSPHATE 10 MG: 10 INJECTION, SOLUTION INTRAMUSCULAR; INTRAVENOUS at 11:15

## 2025-06-04 RX ADMIN — MIDAZOLAM 2 MG: 1 INJECTION INTRAMUSCULAR; INTRAVENOUS at 10:10

## 2025-06-04 RX ADMIN — Medication 1 TABLET: at 15:26

## 2025-06-04 RX ADMIN — TRANEXAMIC ACID 1000 MG: 10 INJECTION, SOLUTION INTRAVENOUS at 11:24

## 2025-06-04 RX ADMIN — FERROUS SULFATE TAB 325 MG (65 MG ELEMENTAL FE) 325 MG: 325 (65 FE) TAB at 17:45

## 2025-06-04 RX ADMIN — DOCUSATE SODIUM 100 MG: 100 CAPSULE, LIQUID FILLED ORAL at 17:45

## 2025-06-04 RX ADMIN — ACETAMINOPHEN 975 MG: 325 TABLET ORAL at 20:55

## 2025-06-04 RX ADMIN — OXYCODONE HYDROCHLORIDE AND ACETAMINOPHEN 500 MG: 500 TABLET ORAL at 17:45

## 2025-06-04 NOTE — PLAN OF CARE
Problem: PHYSICAL THERAPY ADULT  Goal: Performs mobility at highest level of function for planned discharge setting.  See evaluation for individualized goals.  Description: Treatment/Interventions: Functional transfer training, LE strengthening/ROM, Elevations, Therapeutic exercise, Endurance training, Gait training  Equipment Recommended: Walker       See flowsheet documentation for full assessment, interventions and recommendations.  Outcome: Progressing  Note: Prognosis: Good  Problem List: Decreased strength, Decreased range of motion, Decreased endurance, Impaired balance, Decreased mobility, Orthopedic restrictions, Pain  Assessment: Pt is 70 y.o. female seen for PT evaluation s/p admit to [facility] on 6/4/2025 w/ Primary osteoarthritis of right hip. PT consulted to assess pt's functional mobility and d/c needs. Order placed for PT eval and tx, w/ WBAT RLE order. Pt agreeable to PT  session upon arrival, pt found supine in bed.  PTA, pt was independent w/ all functional mobility w/ rw and lives w/ significant other in 1 level home .  Pt to benefit from continued PT tx to address deficits and maximize level of functional independent mobility and consistency. Upon conclusion pt  seated in recliner, with all needs in reach, and chair alarm intact. Complexity: Comorbidities affecting pt's physical performance at time of assessment include:  OA, anxiety, cholecystitis, and total hip arthroplasty . Personal factors affecting pt at time of IE include: advanced age, inaccessible home environment, lives in multistory home, ambulating with assistive device, and steps to enter home. Please find objective findings from PT assessment regarding body systems outlined above with impairments and limitations including weakness, decreased ROM, impaired balance, pain, decreased activity tolerance, decreased functional mobility tolerance, and fall risk.  Pt's clinical presentation is currently unstable/unpredictable seen  in pt's presentation of hypertension, pain, recent surgery, and decline in mobility status. The patient's AM-PAC Basic Mobility Inpatient Short Form Raw Score is 17 .  Based on patient presentations and impairments, pt would most appropriately benefit from Level 3 resource intensity upon discharge. A Raw score of greater than 16 suggests the patient may benefit from discharge to home. Please also refer to the recommendation of the Physical Therapist for safe discharge planning. RN verbalized pt appropriate for PT session.        Rehab Resource Intensity Level, PT: III (Minimum Resource Intensity)    See flowsheet documentation for full assessment.

## 2025-06-04 NOTE — ANESTHESIA POSTPROCEDURE EVALUATION
Post-Op Assessment Note    CV Status:  Stable    Pain management: adequate       Mental Status:  Alert and awake   Hydration Status:  Euvolemic   PONV Controlled:  Controlled   Airway Patency:  Patent     Post Op Vitals Reviewed: Yes    No anethesia notable event occurred.    Staff: Anesthesiologist, CRNA           Last Filed PACU Vitals:  Vitals Value Taken Time   Temp 98.3    Pulse 75 06/04/25 12:27   BP 92/53 06/04/25 12:27   Resp     SpO2 100 % 06/04/25 12:27   Vitals shown include unfiled device data.

## 2025-06-04 NOTE — INTERVAL H&P NOTE
H&P reviewed. After examining the patient I find no changes in the patients condition since the H&P had been written.    Vitals:    06/04/25 0850   BP: 137/78   Pulse: 70   Resp: 18   Temp: 99 °F (37.2 °C)   SpO2: 97%      NECK PAIN

## 2025-06-04 NOTE — OP NOTE
OPERATIVE REPORT  PATIENT NAME: Debbi Major  : 1955  MRN: [id number]  Pt Location:  [facility] OR ROOM 02    Surgery Date: 2025    Surgeons and Role:     * Terry Alvarado DO - Primary     Assistant: Dane Guzmán PA-C    Preop Diagnosis:  Primary osteoarthritis of right hip [M16.11]    Post-Op Diagnosis Codes:     * Primary osteoarthritis of right hip [M16.11]    Procedure(s):  Right - RIGHT TOTAL HIP ARTHROPLASTY using the DePuy hip system with the following sizes: 54 mm acetabular cup, neutral liner, size #3 Actis high offset collared femoral component, and a +5 mm x 36 mm ceramic femoral head    Specimens:  Femoral head/reamings    Estimated Blood Loss:   150    Drains:  None    Anesthesia Type:   Spinal    Operative Indications:  Primary osteoarthritis of right hip [M16.11]    Operative Findings:  TT: 0    Complications:   None      Hip Approach: Posterior    Chronic Narcotic Use:  No      Procedure and Technique:    The patient was properly identified and brought to the operating suite.  After successful induction of the anesthetic, she was placed in the lateral decubitus position with the right side facing up.  All areas of possible skin pressurization were well padded to avoid the above.  The right lower extremity, hip, and abdominal region were then prepped and draped in the usual sterile fashion.      It was medically necessary that he physician's assistant be in the room to aid in positioning and applying the appropriate amount of retraction on the patient.  A qualified resident was not available.  The physician assistant role was very integral to success of this case.  He assisted on positioning, draping, retraction soft tissue, retraction of neurovascular bundles, assisted with implantation of prosthesis, assisted with reduction the prosthesis, and assisedt with closure of a complex wound    The surgical landmarks were outlined with  A skin marker.  Using a  #10 Scalpel blade a posterior  lateral incision was made on the patient's right hip.  Hemostasis was achieved with the use of electrocautery.  This layer was 1st brought down to the subcutaneous fat layer.  After further blunt dissection the gluteal tendon was then located.  Using a 2nd  #10. Scalpel blade the gluteal tendon was divided in line with its fibers.  Next a Charnley tractor was placed in the operative field with the sciatic nerve well-padded well visualized well protected.  The trochanteric fat was then excised.  The short external rotators were then subperiostally divided off the greater trochanter and tagged for future repair.  The femoral head was then dislocated.  The soft tissue along the femoral neck was then removed.  Using the Hotelcloud hip system an osteotomy did occur along the femoral neck leaving between 10-15 mm of bone above the level of the lesser trochanter.  Attention was 1st paid to the acetabulum.  The labrum was then removed.  A long Hohmann retractor was placed anteriorly to retract the proximal femur.  Several retractors were placed posteriorly with the sciatic nerve well-padded well visualized and well protected.  At this point there was adequate visualization of the acetabulum.  Using sequential reamers the acetabulum was reamed between 35-45 degrees of vertical inclination and 20-30 degrees of anteversion.  There was a good bleeding bone base  with a 54 mm Reamer.  A trial liner was placed which had nice secure fit.  After the trial component was removed, the acetabulum was reamed in the same manner of vertical inclination and anteversion.  It was further supplemented with a 6.5 x 35 mm screw placed in the posterior superior quadrant.  A trial liner was placed until the conclusion of the procedure.  The acetabulum was protected with a sponge.  Attention then paid to preparation of the proximal femur.  The soft tissue on the femoral proximal femur was then removed.  The proximal femur was then prepared in the  following fashion.  First its canal was opened up with a box osteotome.  Followed by a canal finer.  Followed by a lateralizing Reamer.  Then using sequential broaches the proximal femur was broached between 10-15 degrees of anteversion.  There was a good seating with the size #3. Broach.  Both standard and high offset high offset necks were trialed.  A high offset neck did have the best fit  with a +5 x 36 mm femoral head.  All the trial components then removed.  The final acetabular cup was placed without any incidence.  It was confirmed that the tines were well seated.  The proximal femur was then irrigated.  The final femoral component placed.  The femoral heads were trialed once again.  There was a good fit with a +5 x 36 mm femoral head.  After the trunion was cleaned and dried, the final ceramic femoral head was placed and reduced 1 final time and found be quite stable.  After it was irrigated, the capsule and short external rotators were then reattached to the greater trochanter using #1. Vicryl.   The hip was irrigated again.  The gluteal tendon was closed with #1. Vicryl.  Irrigated again.  The deep layer fascia closed with 0 Vicryl.  Superficial layers closed with 2-0 Vicryl.  Skin was approximated staples.  The wound was then dressed with ointment Xeroform 4x4s ABDs and foam tape.  She was placed in an abductor pillow, transferred to her hospital bed, and went to the recovery room in stable condition.  There was no complications during procedure.       I was present for the entire procedure., A qualified resident physician was not available., and A physician assistant was required during the procedure for retraction, tissue handling, dissection and suturing.    Patient Disposition:  PACU               SIGNATURE: Terry Alvarado DO  DATE: June 4, 2025  TIME: 10:24 AM

## 2025-06-04 NOTE — DISCHARGE INSTR - AVS FIRST PAGE
TOTAL KNEE/TOTAL HIP REPLACEMENT  POST OPERATIVE INFORMATION    1. You should use a walker or crutches, but you may put as much weight on the leg as is comfortable unless instructed otherwise.  2. You may use ice packs as needed for pain and swelling.  20 minutes is required to allow the cold to penetrate deep enough.  Cover skin with a layer of cloth before applying the ice pack.    3. Pump your ankle up and down frequently throughout the day to facilitate circulation, maintain muscle tone, and to aid in reduction of swelling.  4. You may shower after 5 days, but remember to keep the dressings dry.  5. You should call our office if you experience pain not controlled by your medication, develop a fever, and experience increased drainage or redness around the incision.  6. Do not drive a vehicle until you see your physician at the follow-up appointment.  7. Refer to your total joint card regarding the need for antibiotics for the following procedures:  Any dental procedures, any infection, especially skin infections, vaginal or GYN exams or surgery, and colonoscopy.  8. If you have had a total hip replacement following instructions below to prevent the hip from sliding out a position:   -DO NOT bend your hip more than 90°.  This means no squatting, no bending over to tie your shoes, and no bending from the waist to  things from the floor, even if seated.   -DO NOT cross your operated leg/foot inward (Ajo-toed)   -ALWAYS sleep with pillow or cushion between your legs, as instructed by your doctor   -After surgery these precautions will be again covered by your therapists on a daily basis.  9. Call our office for an appointment to see Dr. Alvarado in about 14 days.  10. You should start outpatient therapy as soon as possible after discharge.   11. The patient was instructed to paint incision with betadine two times per day

## 2025-06-04 NOTE — PHYSICAL THERAPY NOTE
"   PHYSICAL THERAPY EVALUATION  NAME:  Debbi Major  DATE: 06/04/25    AGE:   70 y.o.  Mrn:   [id number]  ADMIT DX:  Primary osteoarthritis of right hip [M16.11]  Problem List: Problem List[1]    Past Medical History  Past Medical History[2]    Past Surgical History  Past Surgical History[3]    Length Of Stay: 0  Performed at least 2 patient identifiers during session: Name and Epic photo       06/04/25 1422   PT Last Visit   PT Visit Date 06/04/25   Note Type   Note type Evaluation   Pain Assessment   Pain Assessment Tool 0-10   Pain Score 7   Pain Location/Orientation Orientation: Right;Location: Hip   Pain Onset/Description Onset: Ongoing   Effect of Pain on Daily Activities Limits mobility   Patient's Stated Pain Goal No pain   Hospital Pain Intervention(s) Cold applied;Repositioned   Restrictions/Precautions   Weight Bearing Precautions Per Order Yes   RLE Weight Bearing Per Order WBAT   Other Precautions Bed Alarm;Chair Alarm;Fall Risk;Pain  (villavicencio cath)   Home Living   Type of Home House   Home Layout One level;Stairs to enter with rails  (3 leila)   Bathroom Shower/Tub Tub/shower unit   Bathroom Toilet Standard   Bathroom Equipment Grab bars in shower;Shower chair;Grab bars around toilet   Bathroom Accessibility Accessible   Home Equipment Walker;Cane  (occasional ad use)   Prior Function   Level of [place] Independent with ADLs;Independent with functional mobility;Independent with IADLS   Lives With Significant other   Receives Help From Family   IADLs Independent with driving;Independent with meal prep;Independent with medication management   Falls in the last 6 months 0   Vocational Retired   Cognition   Overall Cognitive Status WFL   Arousal/Participation Alert   Orientation Level Oriented X4   Memory Within functional limits   Following Commands Follows all commands and directions without difficulty   Subjective   Subjective \"This just feels so sore\"   RLE Assessment   RLE Assessment   (3+/5)   LLE " Assessment   LLE Assessment WFL   Vision-Basic Assessment   Current Vision Wears glasses all the time   Coordination   Sensation WFL   Bed Mobility   Supine to Sit 4  Minimal assistance   Additional items Increased time required;LE management;Verbal cues;Assist x 1;Bedrails   Sit to Supine   (dnt, pt in recliner to end session)   Additional Comments pt denied dizziness, verbal cues for hand and feet placement   Transfers   Sit to Stand 4  Minimal assistance   Additional items Assist x 1;Increased time required;Verbal cues;Armrests;Bedrails   Stand to Sit 4  Minimal assistance   Additional items Assist x 1;Increased time required;Armrests;Bed elevated;Verbal cues   Stand pivot 4  Minimal assistance   Additional items Assist x 1;Increased time required   Additional Comments denied dizziness, pt tolerated mobility well   Balance   Static Sitting Fair +   Dynamic Sitting Fair   Static Standing Fair   Dynamic Standing Fair -   Ambulatory Fair -   Activity Tolerance   Activity Tolerance Patient limited by fatigue;Patient limited by pain   Medical Staff Made Aware cm made aware   Nurse Made Aware rn bee   Assessment   Prognosis Good   Problem List Decreased strength;Decreased range of motion;Decreased endurance;Impaired balance;Decreased mobility;Orthopedic restrictions;Pain   Assessment Pt is 70 y.o. female seen for PT evaluation s/p admit to [facility] on 6/4/2025 w/ Primary osteoarthritis of right hip. PT consulted to assess pt's functional mobility and d/c needs. Order placed for PT eval and tx, w/ WBAT RLE order. Pt agreeable to PT  session upon arrival, pt found supine in bed.  PTA, pt was independent w/ all functional mobility w/ rw and lives w/ significant other in 1 level home .  Pt to benefit from continued PT tx to address deficits and maximize level of functional independent mobility and consistency. Upon conclusion pt  seated in recliner, with all needs in reach, and chair alarm intact. Complexity:  Comorbidities affecting pt's physical performance at time of assessment include:  OA, anxiety, cholecystitis, and total hip arthroplasty . Personal factors affecting pt at time of IE include: advanced age, inaccessible home environment, lives in multistory home, ambulating with assistive device, and steps to enter home. Please find objective findings from PT assessment regarding body systems outlined above with impairments and limitations including weakness, decreased ROM, impaired balance, pain, decreased activity tolerance, decreased functional mobility tolerance, and fall risk.  Pt's clinical presentation is currently unstable/unpredictable seen in pt's presentation of hypertension, pain, recent surgery, and decline in mobility status. The patient's AM-PAC Basic Mobility Inpatient Short Form Raw Score is 17 .  Based on patient presentations and impairments, pt would most appropriately benefit from Level 3 resource intensity upon discharge. A Raw score of greater than 16 suggests the patient may benefit from discharge to home. Please also refer to the recommendation of the Physical Therapist for safe discharge planning. RN verbalized pt appropriate for PT session.   Goals   Patient Goals to get better   LTG Expiration Date 06/14/25   Long Term Goal #1 Pt will: Perform bed mobility tasks to modified I to increase Indep in home environment, improve ease of bed mobility, and improve activity tolerance. Perform transfers to modified I to increase Indep in home environment, decrease risk for falls, improve ease of transfers, and improve activity tolerance. Perform ambulation with rw for 25 ft to  increase Indep in home environment, decrease risk for falls, improve activity tolerance, and improve gait quality. Increase dynamic standing balance to F+ to decrease fall risk.   Increase OOB activity tolerance to 10 minutes without s/s of exertion to decrease fall risk.  Navigate up and down 3 steps with modified I so  patient can enter and exit home.   PT Treatment Day 0   Plan   Treatment/Interventions Functional transfer training;LE strengthening/ROM;Elevations;Therapeutic exercise;Endurance training;Gait training   PT Frequency Twice a day   Discharge Recommendation   Rehab Resource Intensity Level, PT III (Minimum Resource Intensity)   Equipment Recommended Walker   AM-PAC Basic Mobility Inpatient   Turning in Flat Bed Without Bedrails 3   Lying on Back to Sitting on Edge of Flat Bed Without Bedrails 3   Moving Bed to Chair 3   Standing Up From Chair Using Arms 3   Walk in Room 3   Climb 3-5 Stairs With Railing 2   Basic Mobility Inpatient Raw Score 17   Basic Mobility Standardized Score 39.67   [facility] Highest Level Of Mobility   -HLM Goal 5: Stand one or more mins   -HLM Achieved 5: Stand (1 or more minutes)       Time In: 1415  Time Out: 1447  Total Evaluation Minutes: 32    Cornelius Pickens, PT         [1]   Patient Active Problem List  Diagnosis    High cholesterol    Current every day smoker    Environmental allergies    Post-menopausal    Situational anxiety    Other osteoporosis without current pathological fracture    Influenza vaccination declined    Chronic constipation    Status post parathyroidectomy    Calculus of gallbladder with chronic cholecystitis without obstruction    Multinodular thyroid    Primary osteoarthritis of right hip   [2]   Past Medical History:  Diagnosis Date    Colon polyp     Depression     Hyperlipidemia     last assessed - 2016    Hyperparathyroidism (HCC) 2018    Wears glasses    [3]   Past Surgical History:  Procedure Laterality Date     SECTION      COLONOSCOPY      MO PARATHYROIDECTOMY/EXPLORATION PARATHYROIDS Right 2023    Procedure: MINIMALLY INVASIVE RIGHT PARATHYROIDECTOMY;  Surgeon: Davon Garrido MD;  Location: [facility];  Service: Surgical Oncology    US GUIDED THYROID BIOPSY  2022

## 2025-06-04 NOTE — ANESTHESIA PREPROCEDURE EVALUATION
Procedure:  RIGHT TOTAL HIP ARTHROPLASTY (Right: Hip)    Relevant Problems   CARDIO   (+) High cholesterol      HEMATOLOGY   (+) Acute postoperative anemia due to expected blood loss      MUSCULOSKELETAL   (+) Primary osteoarthritis of right hip      NEURO/PSYCH   (+) Situational anxiety      Endocrine   (+) Multinodular thyroid      Behavioral Health   (+) Current every day smoker      Obstetrics/Gynecology   (+) Post-menopausal      Surgery/Wound/Pain   (+) Status post parathyroidectomy      Orthopedic/Musculoskeletal   (+) Other osteoporosis without current pathological fracture      FEN/Gastrointestinal   (+) Chronic constipation      Other   (+) Environmental allergies      Lab Results   Component Value Date     11/28/2014    SODIUM 143 05/23/2025    K 4.3 05/23/2025     05/23/2025    CO2 31 05/23/2025    ANIONGAP 5 11/28/2014    AGAP 8 05/23/2025    BUN 16 05/23/2025    CREATININE 0.73 05/23/2025    GLUC 83 05/05/2025    GLUF 76 05/23/2025    CALCIUM 10.2 05/23/2025    AST 14 05/05/2025    ALT 10 05/05/2025    ALKPHOS 61 05/05/2025    PROT 7.6 11/28/2014    TP 7.2 05/05/2025    BILITOT 0.80 11/28/2014    TBILI 0.44 05/05/2025    EGFR 84 05/23/2025     Lab Results   Component Value Date    WBC 6.56 05/05/2025    HGB 14.8 05/05/2025    HCT 45.3 05/05/2025    MCV 94 05/05/2025     05/05/2025       Physical Exam    Airway     Mallampati score: II  TM Distance: <3 FB  Neck ROM: full      Cardiovascular      Dental       Pulmonary      Neurological      Other Findings  post-pubertal.      Anesthesia Plan  ASA Score- 2     Anesthesia Type- spinal with ASA Monitors.         Additional Monitors:     Airway Plan:            Plan Factors-Exercise tolerance (METS): >4 METS.    Chart reviewed. EKG reviewed. Imaging results reviewed. Existing labs reviewed. Patient summary reviewed.                  Induction-     Postoperative Plan- .   Monitoring Plan - Monitoring plan - standard ASA  monitoring          Informed Consent- Anesthetic plan and risks discussed with patient.  I personally reviewed this patient with the CRNA. Discussed and agreed on the Anesthesia Plan with the CRNA..      NPO Status:  No vitals data found for the desired time range.

## 2025-06-04 NOTE — PLAN OF CARE
Problem: PAIN - ADULT  Goal: Verbalizes/displays adequate comfort level or baseline comfort level  Description: Interventions:  - Encourage patient to monitor pain and request assistance  - Assess pain using appropriate pain scale  - Administer analgesics as ordered based on type and severity of pain and evaluate response  - Implement non-pharmacological measures as appropriate and evaluate response  - Consider cultural and social influences on pain and pain management  - Notify physician/advanced practitioner if interventions unsuccessful or patient reports new pain  - Educate patient/family on pain management process including their role and importance of  reporting pain   - Provide non-pharmacologic/complimentary pain relief interventions  Outcome: Progressing     Problem: INFECTION - ADULT  Goal: Absence or prevention of progression during hospitalization  Description: INTERVENTIONS:  - Assess and monitor for signs and symptoms of infection  - Monitor lab/diagnostic results  - Monitor all insertion sites, i.e. indwelling lines, tubes, and drains  - Monitor endotracheal if appropriate and nasal secretions for changes in amount and color  - [place] appropriate cooling/warming therapies per order  - Administer medications as ordered  - Instruct and encourage patient and family to use good hand hygiene technique  - Identify and instruct in appropriate isolation precautions for identified infection/condition  Outcome: Progressing  Goal: Absence of fever/infection during neutropenic period  Description: INTERVENTIONS:  - Monitor WBC  - Perform strict hand hygiene  - Limit to healthy visitors only  - No plants, dried, fresh or silk flowers with duncan in patient room  Outcome: Progressing     Problem: SAFETY ADULT  Goal: Patient will remain free of falls  Description: INTERVENTIONS:  - Educate patient/family on patient safety including physical limitations  - Instruct patient to call for assistance with activity   -  Consider consulting OT/PT to assist with strengthening/mobility based on AM PAC & JH-HLM score  - Consult OT/PT to assist with strengthening/mobility   - Keep Call bell within reach  - Keep bed low and locked with side rails adjusted as appropriate  - Keep care items and personal belongings within reach  - Initiate and maintain comfort rounds  - Make Fall Risk Sign visible to staff  - Offer Toileting every 1 Hours, in advance of need  - Initiate/Maintain bed alarm  - Obtain necessary fall risk management equipment: bed   - Apply yellow socks and bracelet for high fall risk patients  - Consider moving patient to room near nurses station  Outcome: Progressing  Goal: Maintain or return to baseline ADL function  Description: INTERVENTIONS:  -  Assess patient's ability to carry out ADLs; assess patient's baseline for ADL function and identify physical deficits which impact ability to perform ADLs (bathing, care of mouth/teeth, toileting, grooming, dressing, etc.)  - Assess/evaluate cause of self-care deficits   - Assess range of motion  - Assess patient's mobility; develop plan if impaired  - Assess patient's need for assistive devices and provide as appropriate  - Encourage maximum independence but intervene and supervise when necessary  - Involve family in performance of ADLs  - Assess for home care needs following discharge   - Consider OT consult to assist with ADL evaluation and planning for discharge  - Provide patient education as appropriate  - Monitor functional capacity and physical performance, use of AM PAC & JH-HLM   - Monitor gait, balance and fatigue with ambulation    Outcome: Progressing  Goal: Maintains/Returns to pre admission functional level  Description: INTERVENTIONS:  - Perform AM-PAC 6 Click Basic Mobility/ Daily Activity assessment daily.  - Set and communicate daily mobility goal to care team and patient/family/caregiver.   - Collaborate with rehabilitation services on mobility goals if  consulted  - Perform Range of Motion 3 times a day.  - Reposition patient every 2 hours.  - Dangle patient 3 times a day  - Stand patient 3 times a day  - Ambulate patient 3 times a day  - Out of bed to chair 3 times a day   - Out of bed for meals 3 times a day  - Out of bed for toileting  - Record patient progress and toleration of activity level   Outcome: Progressing     Problem: DISCHARGE PLANNING  Goal: Discharge to home or other facility with appropriate resources  Description: INTERVENTIONS:  - Identify barriers to discharge w/patient and caregiver  - Arrange for needed discharge resources and transportation as appropriate  - Identify discharge learning needs (meds, wound care, etc.)  - Arrange for interpretive services to assist at discharge as needed  - Refer to Case Management Department for coordinating discharge planning if the patient needs post-hospital services based on physician/advanced practitioner order or complex needs related to functional status, cognitive ability, or social support system  Outcome: Progressing     Problem: Knowledge Deficit  Goal: Patient/family/caregiver demonstrates understanding of disease process, treatment plan, medications, and discharge instructions  Description: Complete learning assessment and assess knowledge base.  Interventions:  - Provide teaching at level of understanding  - Provide teaching via preferred learning methods  Outcome: Progressing     Problem: MUSCULOSKELETAL - ADULT  Goal: Maintain or return mobility to safest level of function  Description: INTERVENTIONS:  - Assess patient's ability to carry out ADLs; assess patient's baseline for ADL function and identify physical deficits which impact ability to perform ADLs (bathing, care of mouth/teeth, toileting, grooming, dressing, etc.)  - Assess/evaluate cause of self-care deficits   - Assess range of motion  - Assess patient's mobility  - Assess patient's need for assistive devices and provide as  appropriate  - Encourage maximum independence but intervene and supervise when necessary  - Involve family in performance of ADLs  - Assess for home care needs following discharge   - Consider OT consult to assist with ADL evaluation and planning for discharge  - Provide patient education as appropriate  Outcome: Progressing  Goal: Maintain proper alignment of affected body part  Description: INTERVENTIONS:  - Support, maintain and protect limb and body alignment  - Provide patient/ family with appropriate education  Outcome: Progressing

## 2025-06-04 NOTE — ANESTHESIA PROCEDURE NOTES
Spinal Block    Patient location during procedure: OR  Start time: 6/4/2025 10:31 AM  Reason for block: primary anesthetic  Staffing  Performed by: Hamilton Bonner MD  Authorized by: Hamilton Bonner MD    Preanesthetic Checklist  Completed: patient identified, IV checked, site marked, risks and benefits discussed, surgical consent, monitors and equipment checked, pre-op evaluation and timeout performed  Spinal Block  Patient position: sitting  Prep: ChloraPrep  Patient monitoring: cardiac monitor, continuous pulse ox and frequent blood pressure checks  Approach: midline  Location: L3-4  Needle  Needle gauge: 24 G  Assessment  Injection Assessment:  negative aspiration for heme, no paresthesia on injection and positive aspiration for clear CSF.  Post-procedure:  site cleaned

## 2025-06-05 ENCOUNTER — APPOINTMENT (OUTPATIENT)
Dept: PHYSICAL THERAPY | Age: 70
End: 2025-06-05
Attending: ORTHOPAEDIC SURGERY
Payer: MEDICARE

## 2025-06-05 VITALS
OXYGEN SATURATION: 93 % | SYSTOLIC BLOOD PRESSURE: 108 MMHG | WEIGHT: 140 LBS | DIASTOLIC BLOOD PRESSURE: 59 MMHG | HEIGHT: 63 IN | RESPIRATION RATE: 16 BRPM | HEART RATE: 63 BPM | TEMPERATURE: 98.3 F | BODY MASS INDEX: 24.8 KG/M2

## 2025-06-05 DIAGNOSIS — M16.11 PRIMARY OSTEOARTHRITIS OF RIGHT HIP: Primary | ICD-10-CM

## 2025-06-05 PROBLEM — D62 ACUTE POSTOPERATIVE ANEMIA DUE TO EXPECTED BLOOD LOSS: Status: ACTIVE | Noted: 2025-06-05

## 2025-06-05 LAB
ANION GAP SERPL CALCULATED.3IONS-SCNC: 5 MMOL/L (ref 4–13)
BUN SERPL-MCNC: 13 MG/DL (ref 5–25)
CALCIUM SERPL-MCNC: 8.8 MG/DL (ref 8.4–10.2)
CHLORIDE SERPL-SCNC: 104 MMOL/L (ref 96–108)
CO2 SERPL-SCNC: 28 MMOL/L (ref 21–32)
CREAT SERPL-MCNC: 0.73 MG/DL (ref 0.6–1.3)
ERYTHROCYTE [DISTWIDTH] IN BLOOD BY AUTOMATED COUNT: 13.2 % (ref 11.6–15.1)
GFR SERPL CREATININE-BSD FRML MDRD: 84 ML/MIN/1.73SQ M
GLUCOSE P FAST SERPL-MCNC: 127 MG/DL (ref 65–99)
GLUCOSE SERPL-MCNC: 127 MG/DL (ref 65–140)
HCT VFR BLD AUTO: 34.7 % (ref 34.8–46.1)
HGB BLD-MCNC: 11.3 G/DL (ref 11.5–15.4)
MCH RBC QN AUTO: 30.5 PG (ref 26.8–34.3)
MCHC RBC AUTO-ENTMCNC: 32.6 G/DL (ref 31.4–37.4)
MCV RBC AUTO: 94 FL (ref 82–98)
PLATELET # BLD AUTO: 224 THOUSANDS/UL (ref 149–390)
PMV BLD AUTO: 10.5 FL (ref 8.9–12.7)
POTASSIUM SERPL-SCNC: 4.2 MMOL/L (ref 3.5–5.3)
RBC # BLD AUTO: 3.7 MILLION/UL (ref 3.81–5.12)
SODIUM SERPL-SCNC: 137 MMOL/L (ref 135–147)
WBC # BLD AUTO: 10.29 THOUSAND/UL (ref 4.31–10.16)

## 2025-06-05 PROCEDURE — 97116 GAIT TRAINING THERAPY: CPT

## 2025-06-05 PROCEDURE — 80048 BASIC METABOLIC PNL TOTAL CA: CPT | Performed by: PHYSICIAN ASSISTANT

## 2025-06-05 PROCEDURE — 99024 POSTOP FOLLOW-UP VISIT: CPT | Performed by: PHYSICIAN ASSISTANT

## 2025-06-05 PROCEDURE — 97110 THERAPEUTIC EXERCISES: CPT

## 2025-06-05 PROCEDURE — NC001 PR NO CHARGE: Performed by: PHYSICIAN ASSISTANT

## 2025-06-05 PROCEDURE — 85027 COMPLETE CBC AUTOMATED: CPT | Performed by: PHYSICIAN ASSISTANT

## 2025-06-05 PROCEDURE — 97166 OT EVAL MOD COMPLEX 45 MIN: CPT

## 2025-06-05 RX ORDER — OXYCODONE HYDROCHLORIDE 5 MG/1
5 TABLET ORAL EVERY 4 HOURS PRN
Qty: 28 TABLET | Refills: 0 | Status: SHIPPED | OUTPATIENT
Start: 2025-06-05 | End: 2025-06-15

## 2025-06-05 RX ORDER — ACETAMINOPHEN 325 MG/1
975 TABLET ORAL EVERY 8 HOURS
Qty: 126 TABLET | Refills: 0 | Status: SHIPPED | OUTPATIENT
Start: 2025-06-05 | End: 2025-06-19

## 2025-06-05 RX ORDER — ENOXAPARIN SODIUM 100 MG/ML
40 INJECTION SUBCUTANEOUS DAILY
Qty: 5.6 ML | Refills: 0 | Status: SHIPPED | OUTPATIENT
Start: 2025-06-05 | End: 2025-06-19

## 2025-06-05 RX ORDER — DOCUSATE SODIUM 100 MG/1
100 CAPSULE, LIQUID FILLED ORAL 2 TIMES DAILY
Qty: 60 CAPSULE | Refills: 0 | Status: SHIPPED | OUTPATIENT
Start: 2025-06-05 | End: 2025-07-05

## 2025-06-05 RX ADMIN — CEFAZOLIN SODIUM 1000 MG: 1 SOLUTION INTRAVENOUS at 11:35

## 2025-06-05 RX ADMIN — Medication 1 TABLET: at 09:00

## 2025-06-05 RX ADMIN — FERROUS SULFATE TAB 325 MG (65 MG ELEMENTAL FE) 325 MG: 325 (65 FE) TAB at 09:00

## 2025-06-05 RX ADMIN — OXYCODONE HYDROCHLORIDE AND ACETAMINOPHEN 500 MG: 500 TABLET ORAL at 09:00

## 2025-06-05 RX ADMIN — ATORVASTATIN CALCIUM 20 MG: 20 TABLET, FILM COATED ORAL at 16:32

## 2025-06-05 RX ADMIN — Medication 2000 UNITS: at 09:00

## 2025-06-05 RX ADMIN — HYDROMORPHONE HYDROCHLORIDE 0.5 MG: 1 INJECTION, SOLUTION INTRAMUSCULAR; INTRAVENOUS; SUBCUTANEOUS at 13:07

## 2025-06-05 RX ADMIN — ACETAMINOPHEN 975 MG: 325 TABLET ORAL at 11:35

## 2025-06-05 RX ADMIN — FONDAPARINUX SODIUM 2.5 MG: 2.5 INJECTION SUBCUTANEOUS at 00:52

## 2025-06-05 RX ADMIN — CALCIUM 2 TABLET: 500 TABLET ORAL at 09:00

## 2025-06-05 RX ADMIN — DOCUSATE SODIUM 100 MG: 100 CAPSULE, LIQUID FILLED ORAL at 09:00

## 2025-06-05 RX ADMIN — TIMOLOL MALEATE 1 DROP: 5 SOLUTION/ DROPS OPHTHALMIC at 09:04

## 2025-06-05 RX ADMIN — ACETAMINOPHEN 975 MG: 325 TABLET ORAL at 03:58

## 2025-06-05 RX ADMIN — CEFAZOLIN SODIUM 1000 MG: 1 SOLUTION INTRAVENOUS at 03:58

## 2025-06-05 RX ADMIN — OXYCODONE 5 MG: 5 TABLET ORAL at 09:00

## 2025-06-05 RX ADMIN — FOLIC ACID 1 MG: 1 TABLET ORAL at 09:00

## 2025-06-05 RX ADMIN — OXYCODONE HYDROCHLORIDE 10 MG: 10 TABLET ORAL at 00:53

## 2025-06-05 RX ADMIN — FERROUS SULFATE TAB 325 MG (65 MG ELEMENTAL FE) 325 MG: 325 (65 FE) TAB at 16:32

## 2025-06-05 RX ADMIN — Medication 1 PACKET: at 09:00

## 2025-06-05 NOTE — PLAN OF CARE
Problem: PAIN - ADULT  Goal: Verbalizes/displays adequate comfort level or baseline comfort level  Description: Interventions:  - Encourage patient to monitor pain and request assistance  - Assess pain using appropriate pain scale  - Administer analgesics as ordered based on type and severity of pain and evaluate response  - Implement non-pharmacological measures as appropriate and evaluate response  - Consider cultural and social influences on pain and pain management  - Notify physician/advanced practitioner if interventions unsuccessful or patient reports new pain  - Educate patient/family on pain management process including their role and importance of  reporting pain   - Provide non-pharmacologic/complimentary pain relief interventions  Outcome: Progressing     Problem: INFECTION - ADULT  Goal: Absence or prevention of progression during hospitalization  Description: INTERVENTIONS:  - Assess and monitor for signs and symptoms of infection  - Monitor lab/diagnostic results  - Monitor all insertion sites, i.e. indwelling lines, tubes, and drains  - Monitor endotracheal if appropriate and nasal secretions for changes in amount and color  - [place] appropriate cooling/warming therapies per order  - Administer medications as ordered  - Instruct and encourage patient and family to use good hand hygiene technique  - Identify and instruct in appropriate isolation precautions for identified infection/condition  Outcome: Progressing

## 2025-06-05 NOTE — PLAN OF CARE
Problem: PHYSICAL THERAPY ADULT  Goal: Performs mobility at highest level of function for planned discharge setting.  See evaluation for individualized goals.  Description: Treatment/Interventions: Functional transfer training, LE strengthening/ROM, Elevations, Therapeutic exercise, Endurance training, Gait training  Equipment Recommended: Walker       See flowsheet documentation for full assessment, interventions and recommendations.  6/5/2025 1356 by Cornelius Pickens PT  Outcome: Progressing  Note: Prognosis: Good  Problem List: Decreased strength, Decreased range of motion, Decreased endurance, Impaired balance, Decreased mobility, Orthopedic restrictions, Pain  Assessment: Pt seen for PT treatment session this date with interventions consisting of gait training to normalize gait pattern to decrease fall risk and therapeutic exercise to improve strength to improve functional mobility. Pt agreeable to PT treatment session upon arrival, pt found seated OOB in recliner, in no apparent distress, A&O x 4, and responsive. Since previous session, pt has made good progress as evidenced by increased distance ambulated, decreased assistance required with mobility, and ability to ambulate  Barriers during this session include pain and fatigue.  Pt continues to be functioning below baseline level, and remains limited 2* factors listed above and including decreased strength, impaired activity tolerance, impaired  balance, pain, decreased coordination, and decreased ROM.  Pt prognosis for achieving goals is good, pending pt progress with hospitalization/medical status improvements, and indicated by motivated to participate in therapy and ability to follow cues. PT will continue to see pt during current hospitalization in order to address the deficits listed above and provide interventions consistent w/ POC in effort to achieve goals. Current goals and POC remain appropriate, pt continues to have rehab potential  Upon conclusion  pt seated OOB in recliner. The patient's AM-PAC Basic Mobility Inpatient Short Form Raw Score is 17.  A Raw score of greater than 16 suggests the patient may benefit from discharge to home. Based on patient presentations and impairments, pt would most appropriately benefit from Level 3 resource intensity upon discharge.  Please also refer to the recommendation of the Physical Therapist for safe discharge planning. RN verbalized pt appropriate for PT session.        Rehab Resource Intensity Level, PT: III (Minimum Resource Intensity)    See flowsheet documentation for full assessment.     6/5/2025 1124 by Cornelius Pickens PT  Outcome: Progressing  Note: Prognosis: Good  Problem List: Decreased strength, Decreased range of motion, Decreased endurance, Impaired balance, Decreased mobility, Orthopedic restrictions, Pain  Assessment: Pt seen for PT treatment session this date with interventions consisting of gait training to normalize gait pattern to decrease fall risk and therapeutic exercise to improve strength to improve functional mobility. Pt agreeable to PT treatment session upon arrival, pt found seated OOB in recliner, in no apparent distress, A&O x 4, and responsive. Since previous session, pt has made good progress as evidenced by increased distance ambulated, decreased assistance required with mobility, and ability to ambulate  Barriers during this session include pain and fatigue.  Pt continues to be functioning below baseline level, and remains limited 2* factors listed above and including decreased strength, impaired activity tolerance, impaired  balance, pain, decreased coordination, and decreased ROM.  Pt prognosis for achieving goals is good, pending pt progress with hospitalization/medical status improvements, and indicated by motivated to participate in therapy and ability to follow cues. PT will continue to see pt during current hospitalization in order to address the deficits listed above and provide  interventions consistent w/ POC in effort to achieve goals. Current goals and POC remain appropriate, pt continues to have rehab potential  Upon conclusion pt seated OOB in recliner. The patient's AM-PAC Basic Mobility Inpatient Short Form Raw Score is 17.  A Raw score of greater than 16 suggests the patient may benefit from discharge to home. Based on patient presentations and impairments, pt would most appropriately benefit from Level 3 resource intensity upon discharge.  Please also refer to the recommendation of the Physical Therapist for safe discharge planning. RN verbalized pt appropriate for PT session.        Rehab Resource Intensity Level, PT: III (Minimum Resource Intensity)    See flowsheet documentation for full assessment.

## 2025-06-05 NOTE — PHYSICAL THERAPY NOTE
"   PT Treatment Note    NAME:  Debbi Major  DATE: 06/05/25    AGE:   70 y.o.  Mrn:   [id number]  ADMIT DX:  Primary osteoarthritis of right hip [M16.11]  Performed at least 2 patient identifiers during session: Name and Epic photo       06/05/25 1019   PT Last Visit   PT Visit Date 06/05/25   Note Type   Note Type BID visit/treatment   Pain Assessment   Pain Assessment Tool 0-10   Pain Score 6   Pain Location/Orientation Orientation: Right;Location: Hip   Pain Onset/Description Onset: Ongoing   Patient's Stated Pain Goal No pain   Restrictions/Precautions   Weight Bearing Precautions Per Order Yes   RLE Weight Bearing Per Order WBAT   Other Precautions Chair Alarm;Bed Alarm;Fall Risk;Pain   General   Chart Reviewed Yes   Family/Caregiver Present No   Cognition   Overall Cognitive Status WFL   Arousal/Participation Alert   Attention Within functional limits   Orientation Level Oriented X4   Memory Within functional limits   Following Commands Follows all commands and directions without difficulty   Subjective   Subjective \"Im in so much pain\"   Bed Mobility   Additional Comments Pt in recliner to end session   Transfers   Sit to Stand   (cga)   Additional items Assist x 1;Increased time required;Verbal cues   Stand to Sit   (cga)   Additional items Assist x 1;Increased time required;Armrests   Additional Comments denied dizziness with transitional movements,vc for sequencing and proper technique   Ambulation/Elevation   Gait pattern Decreased foot clearance;Improper Weight shift;Antalgic;Decreased R stance   Gait Assistance   (cga)   Additional items Assist x 1;Verbal cues;Tactile cues   Assistive Device Rolling walker   Distance 25 ft x 2   Ambulation/Elevation Additional Comments pt fearful with ambulation and states that she is worried about the pain, 2nd trial of ambulation much safer and pt weight beared through surgical leg more than initial   Balance   Static Sitting Fair +   Dynamic Sitting Fair   Static " Standing Fair   Dynamic Standing Fair -   Ambulatory Fair -   Activity Tolerance   Activity Tolerance Patient limited by fatigue;Patient limited by pain   Exercises   Quad Sets Sitting;15 reps;Bilateral   Glute Sets Sitting;15 reps;Bilateral   Hip Abduction Sitting;15 reps;AROM   Hip Adduction Sitting;15 reps;AROM   Knee AROM Long Arc Quad Sitting;20 reps;AROM;Right   Ankle Pumps Sitting;20 reps;Bilateral   Assessment   Prognosis Good   Problem List Decreased strength;Decreased range of motion;Decreased endurance;Impaired balance;Decreased mobility;Orthopedic restrictions;Pain   Assessment Pt seen for PT treatment session this date with interventions consisting of gait training to normalize gait pattern to decrease fall risk and therapeutic exercise to improve strength to improve functional mobility. Pt agreeable to PT treatment session upon arrival, pt found seated OOB in recliner, in no apparent distress, A&O x 4, and responsive. Since previous session, pt has made good progress as evidenced by increased distance ambulated, decreased assistance required with mobility, and ability to ambulate  Barriers during this session include pain and fatigue.  Pt continues to be functioning below baseline level, and remains limited 2* factors listed above and including decreased strength, impaired activity tolerance, impaired  balance, pain, decreased coordination, and decreased ROM.  Pt prognosis for achieving goals is good, pending pt progress with hospitalization/medical status improvements, and indicated by motivated to participate in therapy and ability to follow cues. PT will continue to see pt during current hospitalization in order to address the deficits listed above and provide interventions consistent w/ POC in effort to achieve goals. Current goals and POC remain appropriate, pt continues to have rehab potential  Upon conclusion pt seated OOB in recliner. The patient's AM-PAC Basic Mobility Inpatient Short Form Raw  Score is 17.  A Raw score of greater than 16 suggests the patient may benefit from discharge to home. Based on patient presentations and impairments, pt would most appropriately benefit from Level 3 resource intensity upon discharge.  Please also refer to the recommendation of the Physical Therapist for safe discharge planning. RN verbalized pt appropriate for PT session.   Goals   Patient Goals to go home and have less pain   LTG Expiration Date 06/14/25   PT Treatment Day 1   Plan   Treatment/Interventions Functional transfer training;LE strengthening/ROM;Elevations;Therapeutic exercise;Endurance training;Gait training   Progress Progressing toward goals   PT Frequency Twice a day   Discharge Recommendation   Rehab Resource Intensity Level, PT III (Minimum Resource Intensity)   Equipment Recommended Walker   AM-PAC Basic Mobility Inpatient   Turning in Flat Bed Without Bedrails 3   Lying on Back to Sitting on Edge of Flat Bed Without Bedrails 3   Moving Bed to Chair 3   Standing Up From Chair Using Arms 3   Walk in Room 3   Climb 3-5 Stairs With Railing 2   Basic Mobility Inpatient Raw Score 17   Basic Mobility Standardized Score 39.67   [facility] Highest Level Of Mobility   JH-HLM Goal 5: Stand one or more mins   JH-HLM Achieved 7: Walk 25 feet or more       Time In: 1008  Time Out: 1047  Total Treatment Minutes: 39    Cornelius Pickens, PT

## 2025-06-05 NOTE — PLAN OF CARE
Problem: PHYSICAL THERAPY ADULT  Goal: Performs mobility at highest level of function for planned discharge setting.  See evaluation for individualized goals.  Description: Treatment/Interventions: Functional transfer training, LE strengthening/ROM, Elevations, Therapeutic exercise, Endurance training, Gait training  Equipment Recommended: Walker       See flowsheet documentation for full assessment, interventions and recommendations.  Outcome: Progressing  Note: Prognosis: Good  Problem List: Decreased strength, Decreased range of motion, Decreased endurance, Impaired balance, Decreased mobility, Orthopedic restrictions, Pain  Assessment: Pt seen for PT treatment session this date with interventions consisting of gait training to normalize gait pattern to decrease fall risk and therapeutic exercise to improve strength to improve functional mobility. Pt agreeable to PT treatment session upon arrival, pt found seated OOB in recliner, in no apparent distress, A&O x 4, and responsive. Since previous session, pt has made good progress as evidenced by increased distance ambulated, decreased assistance required with mobility, and ability to ambulate  Barriers during this session include pain and fatigue.  Pt continues to be functioning below baseline level, and remains limited 2* factors listed above and including decreased strength, impaired activity tolerance, impaired  balance, pain, decreased coordination, and decreased ROM.  Pt prognosis for achieving goals is good, pending pt progress with hospitalization/medical status improvements, and indicated by motivated to participate in therapy and ability to follow cues. PT will continue to see pt during current hospitalization in order to address the deficits listed above and provide interventions consistent w/ POC in effort to achieve goals. Current goals and POC remain appropriate, pt continues to have rehab potential  Upon conclusion pt seated OOB in recliner. The  patient's AM-PAC Basic Mobility Inpatient Short Form Raw Score is 17.  A Raw score of greater than 16 suggests the patient may benefit from discharge to home. Based on patient presentations and impairments, pt would most appropriately benefit from Level 3 resource intensity upon discharge.  Please also refer to the recommendation of the Physical Therapist for safe discharge planning. RN verbalized pt appropriate for PT session.        Rehab Resource Intensity Level, PT: III (Minimum Resource Intensity)    See flowsheet documentation for full assessment.

## 2025-06-05 NOTE — OCCUPATIONAL THERAPY NOTE
Occupational Therapy Evaluation     Patient Name: Debbi Major  Today's Date: 6/5/2025  Problem List  Principal Problem:    Primary osteoarthritis of right hip  Active Problems:    Acute postoperative anemia due to expected blood loss    Past Medical History  Past Medical History[1]  Past Surgical History  Past Surgical History[2]        06/05/25 1030   OT Last Visit   OT Visit Date 06/05/25   Note Type   Note type Evaluation   Additional Comments Pt seen as a co-eval with PT due to the patient's co-morbidities, clinically unstable presentation, and present impairments which are a regression from the patient's baseline.   Pain Assessment   Pain Assessment Tool 0-10   Pain Score 6   Pain Location/Orientation Orientation: Right;Location: Hip   Pain Radiating Towards n/a   Pain Onset/Description Onset: Ongoing   Effect of Pain on Daily Activities mobility   Patient's Stated Pain Goal No pain   Hospital Pain Intervention(s) Medication (See MAR);Repositioned   Multiple Pain Sites No   Restrictions/Precautions   Weight Bearing Precautions Per Order Yes   RLE Weight Bearing Per Order FWB   Other Precautions Chair Alarm;Bed Alarm;Fall Risk;Pain   Home Living   Type of Home House   Home Layout One level;Stairs to enter with rails  (3 LORRIE w/ HR)   Bathroom Shower/Tub Tub/shower unit   Bathroom Toilet Standard   Bathroom Equipment Grab bars in shower;Shower chair;Grab bars around toilet   Bathroom Accessibility Accessible   Home Equipment Walker;Cane  (Pt reports occasional AD used at baseline)   Prior Function   Level of [place] Independent with ADLs;Independent with functional mobility;Independent with IADLS   Lives With Significant other   Receives Help From Family   IADLs Independent with driving;Independent with meal prep;Independent with medication management   Falls in the last 6 months 0  (pt denies)   Vocational Retired   Lifestyle   Autonomy Pt resides in one level house w/ significant other; I with ADLs,  "mobility and IADLs at baseline; +    Reciprocal Relationships supportive family   Service to Others retired   Subjective   Subjective \"I don't know if I can do the steps today\"   ADL   Where Assessed Chair   Eating Assistance 6  Modified independent   Grooming Assistance 6  Modified Independent   UB Bathing Assistance 5  Supervision/Setup   LB Bathing Assistance 4  Minimal Assistance   UB Dressing Assistance 5  Supervision/Setup   LB Dressing Assistance 3  Moderate Assistance   LB Dressing Deficit Thread LLE into pants;Thread RLE into pants;Don/doff L sock;Don/doff R sock  (Therapist educated patient on use of appropriate AE, including sock aide and reacher, to maintain hip precautions during LB dressing)   Additional Comments Given functional performance skills + medical complexity, therapist suspects via clinical judgement + skilled analysis; pt currently requires stated assist above to perform each area of ADLs d/t limitations including: functional transfers, functional activity tolerance, coordination, sitting/standing balance, functional mobility and overall pain   Bed Mobility   Additional Comments DNT; pt seated in recliner upon arrival and conclusion of session   Transfers   Sit to Stand   (CGA)   Additional items Assist x 1;Increased time required;Verbal cues   Stand to Sit   (CGA)   Additional items Assist x 1;Increased time required;Armrests   Additional Comments RW used; VC for safe hand placement, proper body mechanics and overall RW management during directional turns   Functional Mobility   Functional Mobility   (CGA)   Additional Comments Pt completed short distance ADL mobility around room at CGA level w/ RW. No significant LOB observed, mild to moderate instability   Additional items Rolling walker   Balance   Static Sitting Fair +   Dynamic Sitting Fair   Static Standing Fair   Dynamic Standing Fair -   Ambulatory Fair -   Activity Tolerance   Activity Tolerance Patient limited by " fatigue;Patient limited by pain   Medical Staff Made Aware Yes, CM made aware of d/c recs   Nurse Made Aware Yes, nursing staff made aware of session outcomes   RUE Assessment   RUE Assessment WFL   RUE Strength   RUE Overall Strength   (4-/5)   LUE Assessment   LUE Assessment WFL   LUE Strength   LUE Overall Strength   (4-/5)   Hand Function   Gross Motor Coordination Functional   Fine Motor Coordination Functional   Sensation   Light Touch No apparent deficits   Vision-Basic Assessment   Current Vision Wears glasses all the time   Cognition   Overall Cognitive Status WFL   Arousal/Participation Alert   Attention Within functional limits   Orientation Level Oriented X4   Memory Within functional limits   Following Commands Follows all commands and directions without difficulty   Comments Pt agreeable to OT evaluation, pleasant   Assessment   Limitation Decreased ADL status;Decreased UE strength;Decreased Safe judgement during ADL;Decreased endurance;Decreased high-level ADLs;Decreased self-care trans   Prognosis Good   Assessment Pt is a 70 y.o. female seen for OT evaluation s/p admit to [facility] on 6/4/2025 w/ Primary osteoarthritis of right hip.  Comorbidities affecting pt's functional performance at time of assessment include: anemia, high cholesterol, osteoporosis, constipation, parathyroidectomy. Personal factors affecting pt at time of IE include:steps to enter environment, difficulty performing ADLS, difficulty performing IADLS , decreased initiation and engagement , health management , and environment. OT order placed to assess Pt's ADLs, cognitive status, and performance during functional tasks in order to maximize safety and independence while making most appropriate d/c recommendations. Prior to admission, pt was I with ADLs, mobility and IADLs. Upon evaluation: the following deficits impact occupational performance: weakness, decreased strength, decreased balance, decreased tolerance, decreased safety  awareness, and increased pain. Pt's clinical presentation is currently stable  given new onset deficits that effect Pt's occupational performance and ability to safely return to PLOF including decrease activity tolerance, decrease standing balance, decrease sitting balance, decrease performance during ADL tasks, decrease safety awareness , decrease generalized strength, decrease activity engagement, decrease performance during functional transfers, steps to enter home, and high fall risk combined with medical complications of poor blood pressure control, pain impacting overall mobility status, abnormal CBC, decreased skin integrity, and need for input for mobility technique/safety.  Pt to benefit from continued skilled OT tx while in the hospital to address deficits as defined above and maximize level of functional independence w ADL's and functional mobility. Occupational Performance areas to address include: grooming, bathing/shower, toilet hygiene, dressing, functional mobility, community mobility, and clothing management. From OT standpoint, recommendation at time of d/c would with minimal intensity OT resources.   Goals   Patient Goals to have less pain   Plan   Treatment Interventions ADL retraining;Functional transfer training;UE strengthening/ROM;Endurance training;Patient/family training;Compensatory technique education;Energy conservation;Activityengagement   Goal Expiration Date 06/15/25   OT Treatment Day 0   OT Frequency 2-3x/wk   Discharge Recommendation   Rehab Resource Intensity Level, OT III (Minimum Resource Intensity)   Additional Comments  The patient's raw score on the -PAC Daily Activity inpatient short form is 19, standardized score is 40.22, greater than 39.4. Patients at this level are likely to benefit from discharge with minimal intensity OT resources. Please refer to the recommendation of the Occupational Therapist for safe discharge planning.   AM-PAC Daily Activity Inpatient   Lower  Body Dressing 2   Bathing 3   Toileting 3   Upper Body Dressing 3   Grooming 4   Eating 4   Daily Activity Raw Score 19   Daily Activity Standardized Score (Calc for Raw Score >=11) 40.22   AM-PAC Applied Cognition Inpatient   Following a Speech/Presentation 4   Understanding Ordinary Conversation 4   Taking Medications 4   Remembering Where Things Are Placed or Put Away 4   Remembering List of 4-5 Errands 4   Taking Care of Complicated Tasks 4   Applied Cognition Raw Score 24   Applied Cognition Standardized Score 62.21     GOALS    Pt will achieve the following within specified time frame: LTG  Pt will achieve the following goals within 10 days    *ADL transfers with (S) for inc'd independence with ADLs/purposeful tasks    *UB ADL with (I) for inc'd independence with self cares    *LB ADL with (S) using AE prn for inc'd independence with self cares    *Toileting with (S) for clothing management and hygiene for return to PLOF with personal care    *Increase static stand balance and dyn stand balance to F+ for inc'd safety with standing purposeful tasks    *Increase stand tolerance x7 m for inc'd tolerance with standing purposeful tasks    *Bed mobility- (I) for inc'd independence to manage own comfort and initiate EOB & OOB purposeful tasks    *Participate in 10-15m UE therex to increase overall stamina/activity tolerance for purposeful tasks      Yvonne Roldan MS, OTR/L            [1]   Past Medical History:  Diagnosis Date    Colon polyp     Depression     Hyperlipidemia     last assessed - 2016    Hyperparathyroidism (HCC) 2018    Wears glasses    [2]   Past Surgical History:  Procedure Laterality Date     SECTION      COLONOSCOPY      OR PARATHYROIDECTOMY/EXPLORATION PARATHYROIDS Right 2023    Procedure: MINIMALLY INVASIVE RIGHT PARATHYROIDECTOMY;  Surgeon: Davon Garrido MD;  Location: [facility];  Service: Surgical Oncology    US GUIDED THYROID BIOPSY  2022

## 2025-06-05 NOTE — PLAN OF CARE
Problem: PAIN - ADULT  Goal: Verbalizes/displays adequate comfort level or baseline comfort level  Description: Interventions:  - Encourage patient to monitor pain and request assistance  - Assess pain using appropriate pain scale  - Administer analgesics as ordered based on type and severity of pain and evaluate response  - Implement non-pharmacological measures as appropriate and evaluate response  - Consider cultural and social influences on pain and pain management  - Notify physician/advanced practitioner if interventions unsuccessful or patient reports new pain  - Educate patient/family on pain management process including their role and importance of  reporting pain   - Provide non-pharmacologic/complimentary pain relief interventions  Outcome: Adequate for Discharge     Problem: INFECTION - ADULT  Goal: Absence or prevention of progression during hospitalization  Description: INTERVENTIONS:  - Assess and monitor for signs and symptoms of infection  - Monitor lab/diagnostic results  - Monitor all insertion sites, i.e. indwelling lines, tubes, and drains  - Monitor endotracheal if appropriate and nasal secretions for changes in amount and color  - [place] appropriate cooling/warming therapies per order  - Administer medications as ordered  - Instruct and encourage patient and family to use good hand hygiene technique  - Identify and instruct in appropriate isolation precautions for identified infection/condition  Outcome: Adequate for Discharge  Goal: Absence of fever/infection during neutropenic period  Description: INTERVENTIONS:  - Monitor WBC  - Perform strict hand hygiene  - Limit to healthy visitors only  - No plants, dried, fresh or silk flowers with duncan in patient room  Outcome: Adequate for Discharge     Problem: SAFETY ADULT  Goal: Patient will remain free of falls  Description: INTERVENTIONS:  - Educate patient/family on patient safety including physical limitations  - Instruct patient to call  for assistance with activity   - Consider consulting OT/PT to assist with strengthening/mobility based on AM PAC & JH-HLM score  - Consult OT/PT to assist with strengthening/mobility   - Keep Call bell within reach  - Keep bed low and locked with side rails adjusted as appropriate  - Keep care items and personal belongings within reach  - Initiate and maintain comfort rounds  - Make Fall Risk Sign visible to staff  - Offer Toileting every  Hours, in advance of need  - Initiate/Maintain alarm  - Obtain necessary fall risk management equipment:   - Apply yellow socks and bracelet for high fall risk patients  - Consider moving patient to room near nurses station  Outcome: Adequate for Discharge  Goal: Maintain or return to baseline ADL function  Description: INTERVENTIONS:  -  Assess patient's ability to carry out ADLs; assess patient's baseline for ADL function and identify physical deficits which impact ability to perform ADLs (bathing, care of mouth/teeth, toileting, grooming, dressing, etc.)  - Assess/evaluate cause of self-care deficits   - Assess range of motion  - Assess patient's mobility; develop plan if impaired  - Assess patient's need for assistive devices and provide as appropriate  - Encourage maximum independence but intervene and supervise when necessary  - Involve family in performance of ADLs  - Assess for home care needs following discharge   - Consider OT consult to assist with ADL evaluation and planning for discharge  - Provide patient education as appropriate  - Monitor functional capacity and physical performance, use of AM PAC & JH-HLM   - Monitor gait, balance and fatigue with ambulation    Outcome: Adequate for Discharge  Goal: Maintains/Returns to pre admission functional level  Description: INTERVENTIONS:  - Perform AM-PAC 6 Click Basic Mobility/ Daily Activity assessment daily.  - Set and communicate daily mobility goal to care team and patient/family/caregiver.   - Collaborate with  rehabilitation services on mobility goals if consulted  - Perform Range of Motion times a day.  - Reposition patient every  hours.  - Dangle patient  times a day  - Stand patient  times a day  - Ambulate patient  times a day  - Out of bed to chair  times a day   - Out of bed for meals imes a day  - Out of bed for toileting  - Record patient progress and toleration of activity level   Outcome: Adequate for Discharge     Problem: DISCHARGE PLANNING  Goal: Discharge to home or other facility with appropriate resources  Description: INTERVENTIONS:  - Identify barriers to discharge w/patient and caregiver  - Arrange for needed discharge resources and transportation as appropriate  - Identify discharge learning needs (meds, wound care, etc.)  - Arrange for interpretive services to assist at discharge as needed  - Refer to Case Management Department for coordinating discharge planning if the patient needs post-hospital services based on physician/advanced practitioner order or complex needs related to functional status, cognitive ability, or social support system  Outcome: Adequate for Discharge     Problem: Knowledge Deficit  Goal: Patient/family/caregiver demonstrates understanding of disease process, treatment plan, medications, and discharge instructions  Description: Complete learning assessment and assess knowledge base.  Interventions:  - Provide teaching at level of understanding  - Provide teaching via preferred learning methods  Outcome: Adequate for Discharge     Problem: MUSCULOSKELETAL - ADULT  Goal: Maintain or return mobility to safest level of function  Description: INTERVENTIONS:  - Assess patient's ability to carry out ADLs; assess patient's baseline for ADL function and identify physical deficits which impact ability to perform ADLs (bathing, care of mouth/teeth, toileting, grooming, dressing, etc.)  - Assess/evaluate cause of self-care deficits   - Assess range of motion  - Assess patient's mobility  -  Assess patient's need for assistive devices and provide as appropriate  - Encourage maximum independence but intervene and supervise when necessary  - Involve family in performance of ADLs  - Assess for home care needs following discharge   - Consider OT consult to assist with ADL evaluation and planning for discharge  - Provide patient education as appropriate  Outcome: Adequate for Discharge  Goal: Maintain proper alignment of affected body part  Description: INTERVENTIONS:  - Support, maintain and protect limb and body alignment  - Provide patient/ family with appropriate education  Outcome: Adequate for Discharge

## 2025-06-05 NOTE — ASSESSMENT & PLAN NOTE
POD 1 s/p Elective right total hip replacement  Dressing changed  Out of bed  DC planning  Arixtra DVT prophylaxis

## 2025-06-05 NOTE — PLAN OF CARE
Problem: OCCUPATIONAL THERAPY ADULT  Goal: Performs self-care activities at highest level of function for planned discharge setting.  See evaluation for individualized goals.  Description: Treatment Interventions: ADL retraining, Functional transfer training, UE strengthening/ROM, Endurance training, Patient/family training, Compensatory technique education, Energy conservation, Activityengagement      See flowsheet documentation for full assessment, interventions and recommendations.   Note: Limitation: Decreased ADL status, Decreased UE strength, Decreased Safe judgement during ADL, Decreased endurance, Decreased high-level ADLs, Decreased self-care trans  Prognosis: Good  Assessment: Pt is a 70 y.o. female seen for OT evaluation s/p admit to [facility] on 6/4/2025 w/ Primary osteoarthritis of right hip.  Comorbidities affecting pt's functional performance at time of assessment include: anemia, high cholesterol, osteoporosis, constipation, parathyroidectomy. Personal factors affecting pt at time of IE include:steps to enter environment, difficulty performing ADLS, difficulty performing IADLS , decreased initiation and engagement , health management , and environment. OT order placed to assess Pt's ADLs, cognitive status, and performance during functional tasks in order to maximize safety and independence while making most appropriate d/c recommendations. Prior to admission, pt was I with ADLs, mobility and IADLs. Upon evaluation: the following deficits impact occupational performance: weakness, decreased strength, decreased balance, decreased tolerance, decreased safety awareness, and increased pain. Pt's clinical presentation is currently stable  given new onset deficits that effect Pt's occupational performance and ability to safely return to OF including decrease activity tolerance, decrease standing balance, decrease sitting balance, decrease performance during ADL tasks, decrease safety awareness ,  decrease generalized strength, decrease activity engagement, decrease performance during functional transfers, steps to enter home, and high fall risk combined with medical complications of poor blood pressure control, pain impacting overall mobility status, abnormal CBC, decreased skin integrity, and need for input for mobility technique/safety.  Pt to benefit from continued skilled OT tx while in the hospital to address deficits as defined above and maximize level of functional independence w ADL's and functional mobility. Occupational Performance areas to address include: grooming, bathing/shower, toilet hygiene, dressing, functional mobility, community mobility, and clothing management. From OT standpoint, recommendation at time of d/c would with minimal intensity OT resources.     Rehab Resource Intensity Level, OT: III (Minimum Resource Intensity)     Yvonne Roldan OTR/L

## 2025-06-05 NOTE — DISCHARGE SUMMARY
Discharge Summary - Orthopedics   Name: Debbi Major 70 y.o. female I MRN: [id number]  Unit/Bed#: -01 I Date of Admission: 6/4/2025   Date of Service: 6/5/2025 I Hospital Day: 0    Admission Date: 6/4/2025 0826  Discharge Date: 06/05/25  Admitting Diagnosis: Primary osteoarthritis of right hip [M16.11]  Discharge Diagnosis: Primary osteoarthritis of right hip, status post elective right total hip replacement        Procedures Performed: Elective right total hip replacement    Summary of Hospital Course:     70-year-old female presented to the hospital for an elective right total hip replacement.  The patient tolerated the procedure quite well.  She was placed on Arixtra for DVT prophylaxis and began working with physical therapy and Occupational Therapy weightbearing as tolerated with hip precautions.  On postop day 1, her dressing was changed.  Her incision was clean, dry and intact.  She continued to progress with therapy and was then deemed suitable for discharge to home. The patient was discharged with pain medication, vitamins, Lovenox for DVT prophylaxis; and if necessary, an elevated commode, walker, and hip chair. The patient was instructed no hip flexion past 90 degrees, no adduction past midline, and no internal rotation. The patient was instructed to paint incision with betadine two times per day.      Condition at Discharge: stable       Discharge instructions/Information to patient and family:   See After Visit Summary (AVS) for information provided to patient and family.      Provisions for Follow-Up Care:  See after visit summary for information related to follow-up care and any pertinent home health orders.      PCP: Amanda Wynn MD    Disposition: Home    Planned Readmission: No     Discharge Medications:  See after visit summary for reconciled discharge medications provided to patient and family.      Discharge Statement:  I have spent a total time of 30 minutes in caring for this  patient on the day of the visit/encounter.

## 2025-06-05 NOTE — PHYSICAL THERAPY NOTE
"   PT Treatment Note    NAME:  Debbi Major  DATE: 06/05/25    AGE:   70 y.o.  Mrn:   [id number]  ADMIT DX:  Primary osteoarthritis of right hip [M16.11]  Performed at least 2 patient identifiers during session: Name and Epic photo       06/05/25 1338   PT Last Visit   PT Visit Date 06/05/25   Note Type   Note Type BID visit/treatment   Pain Assessment   Pain Assessment Tool 0-10   Pain Score 8   Pain Location/Orientation Orientation: Right;Location: Hip   Pain Onset/Description Onset: Ongoing   Patient's Stated Pain Goal No pain   Hospital Pain Intervention(s) Medication (See MAR);Cold applied;Repositioned   Restrictions/Precautions   Weight Bearing Precautions Per Order Yes   RLE Weight Bearing Per Order FWB   Other Precautions Chair Alarm;Bed Alarm;Fall Risk;Pain   General   Chart Reviewed Yes   Family/Caregiver Present No   Cognition   Overall Cognitive Status WFL   Arousal/Participation Alert   Attention Within functional limits   Orientation Level Oriented X4   Memory Within functional limits   Following Commands Follows all commands and directions without difficulty   Subjective   Subjective \"I feel ready to go home\"   Bed Mobility   Additional Comments dnt, pt in recliner to end session   Transfers   Sit to Stand   (cga)   Additional items Assist x 1;Increased time required;Verbal cues;Armrests   Stand to Sit   (cga)   Additional items Armrests;Increased time required;Verbal cues   Toilet transfer   (cga)   Additional items Increased time required;Commode;Verbal cues;Armrests   Additional Comments rw used, pt denied dizziness with transitional movements, verbal cues for proper sitting and transferring techniques   Ambulation/Elevation   Gait pattern Decreased foot clearance;Improper Weight shift;Decreased R stance;Antalgic   Gait Assistance   (cga)   Additional items Assist x 1;Verbal cues   Assistive Device Rolling walker   Distance 30 ft x 2   Ambulation/Elevation Additional Comments pt increases " confidence with ambulation the more she practices, pt able to tolerate more weight bearing through surgical leg as compared to previous session   Balance   Static Sitting Good   Dynamic Sitting Fair +   Static Standing Fair +   Dynamic Standing Fair   Ambulatory Fair   Activity Tolerance   Activity Tolerance Patient limited by fatigue;Patient limited by pain   Medical Staff Made Aware cm made aware   Exercises   Glute Sets Sitting;15 reps;Bilateral   Knee AROM Long Arc Quad Sitting;10 reps;Right;AAROM   Ankle Pumps Sitting;20 reps;Bilateral   Assessment   Prognosis Good   Problem List Decreased strength;Decreased range of motion;Decreased endurance;Impaired balance;Decreased mobility;Orthopedic restrictions;Pain   Assessment Pt seen for PT treatment session this date with interventions consisting of gait training to normalize gait pattern to decrease fall risk and therapeutic exercise to improve strength to improve functional mobility. Pt agreeable to PT treatment session upon arrival, pt found seated OOB in recliner, in no apparent distress, A&O x 4, and responsive. Since previous session, pt has made good progress as evidenced by increased distance ambulated, decreased assistance required with mobility, and ability to ambulate  Barriers during this session include pain and fatigue.  Pt continues to be functioning below baseline level, and remains limited 2* factors listed above and including decreased strength, impaired activity tolerance, impaired  balance, pain, decreased coordination, and decreased ROM.  Pt prognosis for achieving goals is good, pending pt progress with hospitalization/medical status improvements, and indicated by motivated to participate in therapy and ability to follow cues. PT will continue to see pt during current hospitalization in order to address the deficits listed above and provide interventions consistent w/ POC in effort to achieve goals. Current goals and POC remain appropriate, pt  continues to have rehab potential  Upon conclusion pt seated OOB in recliner. The patient's AM-[facility] Basic Mobility Inpatient Short Form Raw Score is 17.  A Raw score of greater than 16 suggests the patient may benefit from discharge to home. Based on patient presentations and impairments, pt would most appropriately benefit from Level 3 resource intensity upon discharge.  Please also refer to the recommendation of the Physical Therapist for safe discharge planning. RN verbalized pt appropriate for PT session.   Goals   Patient Goals to go home   LTG Expiration Date 06/14/25   PT Treatment Day 2   Plan   Treatment/Interventions Functional transfer training;LE strengthening/ROM;Elevations;Therapeutic exercise;Endurance training;Cognitive reorientation;Gait training   Progress Progressing toward goals   PT Frequency Twice a day   Discharge Recommendation   Rehab Resource Intensity Level, PT III (Minimum Resource Intensity)   Equipment Recommended Walker   AM-[facility] Basic Mobility Inpatient   Turning in Flat Bed Without Bedrails 3   Lying on Back to Sitting on Edge of Flat Bed Without Bedrails 3   Moving Bed to Chair 3   Standing Up From Chair Using Arms 3   Walk in Room 3   Climb 3-5 Stairs With Railing 3   Basic Mobility Inpatient Raw Score 18   Basic Mobility Standardized Score 41.05   [facility] Highest Level Of Mobility   JH-HLM Goal 6: Walk 10 steps or more   -HLM Achieved 7: Walk 25 feet or more       Time In: 1303  Time Out: 1342  Total Treatment Minutes: 39    Cornelius Pickens, PT

## 2025-06-05 NOTE — NURSING NOTE
Report from the prior shift, pt ok for dc to home per ortho team, iv site removed with the tip intact, AVS reviewed with the pt, all questions answered to her satisfaction,taken to family car via wc by the pca and the pt was dcd from the hospital

## 2025-06-05 NOTE — PROGRESS NOTES
"Progress Note - Orthopedics   Name: Debbi Major 70 y.o. female I MRN: [id number]  Unit/Bed#: -01 I Date of Admission: 6/4/2025   Date of Service: 6/5/2025 I Hospital Day: 0     Assessment & Plan  Primary osteoarthritis of right hip  POD 1 s/p Elective right total hip replacement  Dressing changed  Out of bed  DC planning  Arixtra DVT prophylaxis  Acute postoperative anemia due to expected blood loss      Medical co-morbidities include limited endurance and ambulatory dysfunction, which are being managed per primary team. Orthopedics service will follow.    Subjective   70 y.o.female who is resting comfortably in the chair.  She has some lateral right hip pain.  She denies any numbness or tingling.  She denies any fever or chills.  She is eager to work with physical therapy this morning.    Objective :  Temp:  [97.8 °F (36.6 °C)-99.1 °F (37.3 °C)] 98.7 °F (37.1 °C)  HR:  [18-80] 65  BP: ()/(53-73) 94/63  Resp:  [18-20] 18  SpO2:  [90 %-99 %] 94 %  O2 Device: Nasal cannula  Nasal Cannula O2 Flow Rate (L/min):  [2 L/min-4 L/min] 2 L/min    Physical Exam  Right lower extremity is neurovascularly intact  Toes are pink and mobile  Compartments are soft  Good dorsiflexion and plantarflexion of ankle  Incision is clean, dry and intact  Brisk cap refill  Sensation intact    Lab Results: I have reviewed the following results:  Recent Labs     06/05/25  0555   WBC 10.29*   HGB 11.3*   HCT 34.7*      BUN 13   CREATININE 0.73     Blood Culture:  No results found for: \"BLOODCX\"  Wound Culture: No results found for: \"WOUNDCULT\"    Imaging Results Review: No pertinent imaging studies reviewed.  Other Study Results Review: No additional pertinent studies reviewed.  "

## 2025-06-05 NOTE — CASE MANAGEMENT
Case Management Assessment & Discharge Planning Note    Patient name Debbi Major  Location /-01 MRN [id number]  : 1955 Date 2025       Current Admission Date: 2025  Current Admission Diagnosis:Primary osteoarthritis of right hip   Patient Active Problem List    Diagnosis Date Noted    Acute postoperative anemia due to expected blood loss 2025    Primary osteoarthritis of right hip 2025    Multinodular thyroid 2024    Calculus of gallbladder with chronic cholecystitis without obstruction 11/15/2023    Chronic constipation 2022    Influenza vaccination declined 2022    Other osteoporosis without current pathological fracture 2022    Situational anxiety 2020    Post-menopausal 2018    Status post parathyroidectomy 2018    Current every day smoker 2018    Environmental allergies 2018    High cholesterol 2014      LOS (days): 0  Geometric Mean LOS (GMLOS) (days):   Days to GMLOS:     OBJECTIVE:              Current admission status: Outpatient Surgery  Referral Reason: Other (Discharge planning)    Preferred Pharmacy:   [id number] [place] [address]  Phone: [phone] Fax: [phone]    Primary Care Provider: Amanda Wynn MD    Primary Insurance: MEDICARE  Secondary Insurance: AET[employer]    ASSESSMENT:  Active Health Care Proxies    There are no active Health Care Proxies on file.       Advance Directives  Does patient have a Health Care POA?: No  Was patient offered paperwork?: Yes  Does patient currently have a Health Care decision maker?: No  Does patient have Advance Directives?: No  Was patient offered paperwork?: Yes  Primary Contact: Javad Major - Son         Readmission Root Cause  30 Day Readmission: No    Patient Information  Admitted from:: Home  Mental Status: Alert  During Assessment patient was accompanied by: Not  accompanied during assessment  Assessment information provided by:: Patient  Primary Caregiver: Self  Support Systems: Spouse/significant other  County of Residence: [place]  What city do you live in?: [place]  Home entry access options. Select all that apply.: Stairs  Number of steps to enter home.: 3  Do the steps have railings?: Yes  Type of Current Residence: Other (Comment) (1 story home)  Living Arrangements: Lives w/ Spouse/significant other  Is patient a ?: No    Activities of Daily Living Prior to Admission  Functional Status: Independent  Completes ADLs independently?: Yes  Ambulates independently?: Yes  Does patient use assisted devices?: No  Does patient currently own DME?: Yes  What DME does the patient currently own?: Straight Cane, Walker  Does patient have a history of Outpatient Therapy (PT/OT)?: No  Does the patient have a history of Short-Term Rehab?: No  Does patient have a history of HHC?: No  Does patient currently have HHC?: No         Patient Information Continued  Income Source: Employed  Does patient have prescription coverage?: Yes  Can the patient afford their medications and any related supplies (such as glucometers or test strips)?: Yes  Does patient receive dialysis treatments?: No  Does patient have a history of substance abuse?: No  Does patient have a history of Mental Health Diagnosis?: No         Means of Transportation  Means of Transport to Appts:: Drives Self          DISCHARGE DETAILS:    Discharge planning discussed with:: Patient  Freedom of Choice: Yes                        Requested Home Health Care         Is the patient interested in HHC at discharge?: No    DME Referral Provided  Referral made for DME?: No (Pt has all needed DME)    Other Referral/Resources/Interventions Provided:  Interventions: Prescription Price Check  Referral Comments: Per [facility] pt has $66 copay for Eliquis and it will be in stock tomorrow. Pt aware and in agreement  with copay. Pt cleared by therapy to return home with OP PT. Pt will follow up     OP PT as previously scheduled.     Would you like to participate in our [facility] service program?  : No - Declined    Treatment Team Recommendation: Home (with OP PT)  Discharge Destination Plan:: Home (with OP PT)  Transport at Discharge : Family

## 2025-06-05 NOTE — PROGRESS NOTES
PT Evaluation     Today's date: 2025  Patient name: Debbi Major  : 1955  MRN: [id number]  Referring provider: Terry Alvarado DO  Dx:   No diagnosis found.                 Assessment  Impairments: abnormal gait, abnormal muscle firing, abnormal or restricted ROM, activity intolerance, impaired physical strength, lacks appropriate home exercise program and pain with function  Symptom irritability: moderate    Assessment details: Risk Assessment and Prediction Tool results reviewed. Discussed DOS and patient's questions were answered. Mobility/ROM results objective findings. Strength results per above. Balance/Gait assessment (including Timed Up & Go) reviewed. Virtual Home Assessment was reviewed with patient. Home Preparation Checklist was reviewed with patient including identification of care partner and encouragement of single level set-up. Post-operative pain management expectations discussed. Post-operative gait training for level ground, stairs, and car transfers was performed. Patient demonstrated competence with immediate post-operative home exercise program.    25    Understanding of Dx/Px/POC: good     Prognosis: good    Goals  STGs  1) Pt will be able to ambulate with the least restrictive device to return to PLOF  2) Pt will increase AROM at the L/R hip to be able to complete ADLs/IADLs  3) Pt will demonstrate 100% competency of anterior/posterior hip restrictions to ensure appropriate healing and prevent dislocation  4) Pt will be able to ambulate stairs reciprocally      LTGs  1) Pt will improve TUG score from 13 seconds to less than 12 for safe community ambulation  2) Pt will demonstrate 100% competency of HEP to be appropriate for D/C from therapy after goals have been met, pt is functioning at PLOF, and/or the pt displays significant improvement.    3) Pt will improve 5x sit to stand by 3-4 seconds to demonstrate increased LE strength      Plan  Patient would benefit from:  skilled physical therapy  Referral necessary: No  Planned modality interventions: electrical stimulation/Russian stimulation    Planned therapy interventions: joint mobilization, ADL retraining, ADL training, manual therapy, balance, motor coordination training, strengthening, stretching, therapeutic exercise, home exercise program, therapeutic activities, therapeutic training, graded motor, graded exercise, graded activity, gait training, functional ROM exercises, flexibility, transfer training and IADL retraining    Frequency: 2-3x week  Duration in weeks: 10  Plan of Care beginning date: 2025  Plan of Care expiration date: 9/3/2025  Treatment plan discussed with: patient  Plan details: Pt was in agreement that they will be treated by myself in combination with a PTA. Further, informed that we work as a team, the PTAs follow the POC created by the PT, and I trust them to make safe and reasonable changes when appropriate under their scope of practice.     Pt was educated on the nature of their dx and the benefits of completing physical therapy. Additionally, pt is encouraged to ask questions regarding their dx and POC.         Subjective Evaluation    History of Present Illness  Mechanism of injury: Pt is having a R THR on 25              Recurrent probem    Quality of life: good    Patient Goals  Patient goals for therapy: decreased pain, improved balance, increased motion, increased strength, independence with ADLs/IADLs and return to sport/leisure activities    Pain  Current pain ratin  At best pain ratin  At worst pain ratin  Quality: dull ache and grinding  Relieving factors: medications  Aggravating factors: standing, walking, stair climbing and sitting  Progression: worsening    Social Support  Steps to enter house: yes  Stairs in house: yes   Lives in: multiple-level home    Employment status: not working  Hand dominance: right  Exercise history: lawn  maintenance      Diagnostic Tests  X-ray: abnormal      Objective     Active Range of Motion   Left Hip   Flexion: WFL and with pain  Internal rotation (90/90): 5 degrees with pain    Right Hip   Normal active range of motion    Functional Assessment        Comments  5/5/25  5 x sit to stand 18 seconds no UE     2 minute walk test: 13 seconds no AD             POC expires Unit limit Auth Expiration date PT/OT + Visit Limit?   5/4/25 BOMN  BOMN                           Visit/Unit Tracking  AUTH Status:  Date 5/5              No auth needed  Used 1                Remaining                 FOTO                     Precautions:       Manuals                                                                 Neuro Re-Ed                                                                                                        Ther Ex             Ankle pumps             Supine hip abd             Glute set             Qyuad set             LAQs                                                                                                                                                            Ther Activity                                       Gait Training                                       Modalities

## 2025-06-06 ENCOUNTER — TELEPHONE (OUTPATIENT)
Dept: OBGYN CLINIC | Facility: HOSPITAL | Age: 70
End: 2025-06-06

## 2025-06-06 NOTE — ANESTHESIA POSTPROCEDURE EVALUATION
Post-Op Assessment Note    CV Status:  Stable    Pain management: adequate       Mental Status:  Alert and awake   Hydration Status:  Euvolemic   PONV Controlled:  Controlled   Airway Patency:  Patent     Post Op Vitals Reviewed: Yes    No anethesia notable event occurred.    Staff: Anesthesiologist           Last Filed PACU Vitals:  Vitals Value Taken Time   Temp 98.3 °F (36.8 °C) 06/04/25 12:25   Pulse 53 06/04/25 13:36   /60 06/04/25 13:35   Resp 18 06/04/25 13:30   SpO2 98 % 06/04/25 13:36   Vitals shown include unfiled device data.    Modified Reece:     Vitals Value Taken Time   Activity 2 06/04/25 13:30   Respiration 2 06/04/25 13:30   Circulation 2 06/04/25 13:30   Consciousness 2 06/04/25 13:30   Oxygen Saturation 1 06/04/25 13:30     Modified Reece Score: 9

## 2025-06-09 ENCOUNTER — OFFICE VISIT (OUTPATIENT)
Dept: PHYSICAL THERAPY | Age: 70
End: 2025-06-09
Payer: MEDICARE

## 2025-06-09 DIAGNOSIS — Z96.641 S/P TOTAL RIGHT HIP ARTHROPLASTY: ICD-10-CM

## 2025-06-09 DIAGNOSIS — M16.11 PRIMARY OSTEOARTHRITIS OF RIGHT HIP: Primary | ICD-10-CM

## 2025-06-09 PROCEDURE — 97530 THERAPEUTIC ACTIVITIES: CPT | Performed by: PHYSICAL THERAPIST

## 2025-06-09 PROCEDURE — 97110 THERAPEUTIC EXERCISES: CPT | Performed by: PHYSICAL THERAPIST

## 2025-06-09 NOTE — PROGRESS NOTES
PT Evaluation     Today's date: 2025  Patient name: Debbi Major  : 1955  MRN: [id number]  Referring provider: Terry Alvarado DO  Dx:   Encounter Diagnosis     ICD-10-CM    1. Primary osteoarthritis of right hip  M16.11       2. S/P total right hip arthroplasty  Z96.641             Start Time: 1530  Stop Time: 1620  Total time in clinic (min): 50 minutes    Assessment  Impairments: abnormal gait, abnormal muscle firing, abnormal or restricted ROM, activity intolerance, impaired balance, impaired physical strength, lacks appropriate home exercise program and pain with function  Symptom irritability: moderate    Assessment details: Risk Assessment and Prediction Tool results reviewed. Discussed DOS and patient's questions were answered. Mobility/ROM results objective findings. Strength results per above. Balance/Gait assessment (including Timed Up & Go) reviewed. Virtual Home Assessment was reviewed with patient. Home Preparation Checklist was reviewed with patient including identification of care partner and encouragement of single level set-up. Post-operative pain management expectations discussed. Post-operative gait training for level ground, stairs, and car transfers was performed. Patient demonstrated competence with immediate post-operative home exercise program.    25  Patient returns to physical therapy this date for her first post-op visit.  She presents ambulating with a rollator walker with limited weightbearing through the forefoot on the right lower extremity.  Gait improved with verbal cueing.  Patient presents with impairments as outlined above she could benefit from postop physical therapy to maximize functional potential and achieve goals as outlined in her previous visit.  Understanding of Dx/Px/POC: good     Prognosis: good    Goals  STGs  1) Pt will be able to ambulate with the least restrictive device to return to PLOF  2) Pt will increase AROM at the L/R hip to be able to  complete ADLs/IADLs  3) Pt will demonstrate 100% competency of posterior hip restrictions to ensure appropriate healing and prevent dislocation  4) Pt will be able to ambulate stairs reciprocally      LTGs  1) Pt will improve TUG score from 29 seconds to less than 12 for safe community ambulation  2) Pt will demonstrate 100% competency of HEP to be appropriate for D/C from therapy after goals have been met, pt is functioning at PLOF, and/or the pt displays significant improvement.    3) Pt will improve 5x sit to stand by 3-4 seconds to demonstrate increased LE strength      Plan  Patient would benefit from: skilled physical therapy  Referral necessary: No  Planned modality interventions: electrical stimulation/Russian stimulation    Planned therapy interventions: joint mobilization, ADL retraining, ADL training, manual therapy, balance, motor coordination training, therapeutic exercise, home exercise program, therapeutic activities, graded motor, graded exercise, gait training, functional ROM exercises, transfer training, IADL retraining and neuromuscular re-education    Frequency: 2-3x week  Duration in weeks: 10  Plan of Care beginning date: 6/9/2025  Plan of Care expiration date: 9/7/2025  Treatment plan discussed with: patient  Plan details: Pt was in agreement that they will be treated by myself in combination with a PTA. Further, informed that we work as a team, the PTAs follow the POC created by the PT, and I trust them to make safe and reasonable changes when appropriate under their scope of practice.     Pt was educated on the nature of their dx and the benefits of completing physical therapy. Additionally, pt is encouraged to ask questions regarding their dx and POC.           Subjective Evaluation    History of Present Illness  Mechanism of injury: Patient underwent R THR on 6/4/25  Discharge to home 6/5/25 in the evening.  Notes having challenge with constipation with medication. Poor sleep. Sleeping in  a recliner.   Pain at 6/10 at time of treatment; notes mild pins and needles anterior shin.  Presents today for first post op visit                Recurrent probem    Quality of life: good    Patient Goals  Patient goals for therapy: decreased pain, improved balance, increased motion, increased strength, independence with ADLs/IADLs and return to sport/leisure activities  Patient goal: walk without a walker  Pain  Current pain ratin  At best pain ratin  At worst pain ratin  Location: right lateral hip  Quality: dull ache  Relieving factors: medications  Aggravating factors: standing, walking, stair climbing, sitting and nothing    Social Support  Steps to enter house: yes  Stairs in house: yes   Lives in: multiple-level home    Employment status: not working  Hand dominance: right  Exercise history: lawn maintenance      Diagnostic Tests  X-ray: abnormal        Objective     Observations     Right Hip  Positive for edema and incision.     Additional Observation Details  Dressing change in clinic. DSD applied  Previous dressing with dried bloody drainage.  Staples intact. No active drainage currently.   Mild redness at bandage periphery from tape.     Neurological Testing     Sensation     Hip   Left Hip   Intact: light touch    Right Hip   Intact: light touch  Paresthesia: light touch    Comments   Right light touch: right anterior shin.     Active Range of Motion     Right Hip   Flexion: 70 degrees   Abduction: 20 degrees     Passive Range of Motion     Right Hip   Flexion: 85 degrees   Abduction: 30 degrees     Strength/Myotome Testing     Right Hip   Planes of Motion   Flexion: 3-  Abduction: 3+  Adduction: 3+    Ambulation   Weight-Bearing Status   Weight-Bearing Status (Right): weight-bearing as tolerated    Assistive device used: rollator walker    Ambulation: Level Surfaces   Ambulation with assistive device: independent  Ambulation without assistive device: unable    Observational Gait  "  Decreased walking speed, stride length and right stance time.   Right foot contact pattern: forefoot    Functional Assessment        Comments      Neuro Exam:     Functional outcomes   5x sit to stand: 25.69 (seconds)  TU.31 (seconds)             POC expires Unit limit Auth Expiration date PT/OT + Visit Limit?   25 BOMN  BOMN                           Visit/Unit Tracking  AUTH Status:  Date              No auth needed  Used 1  2              Remaining   RE  EVAL              FOTO                     Precautions: R THR - posterior hip precautions      Manuals                                                    [employer] HEP issued            Neuro Re-Ed                                                                                                        Ther Ex             Ankle pumps 20x AH           Supine heel slides A  15x            Glute set 5\"/10x            Ball adductor set HL 3\"/15x            Quad set 5\"/15x            LAQs 10x                         Standing bent knee hip flexion 10x            Standing hip ABD 10x            Standing hip EXT 10x            Calf raises 15x                                                                                          Ther Activity             Hip precautions ed PP            Dressing change PP                                      Gait Training 5'                                      Modalities                                            "

## 2025-06-12 NOTE — TELEPHONE ENCOUNTER
Patient contacted for a postoperative follow up assessment. Patient states current pain level of a 3/10 when sitting and 4/10 when walking with RW.  Patient reports increase in swelling and dressing is Dressing C/D/I Patient isicing the site regularly. NN educated patient on post-op bruising, swelling, and icing. PT 6/13 at 4PM.     We reviewed patients AVS medication list. Patient is taking Tylenol 975mg every 8 hours, Oxycodone 5mg PRN, Lovenox injections daily, and Colace 100mg BID.Patient has had a BM but ispassing gas.       Patient denies nausea, vomiting, abdominal pain, chest pain, shortness of breath, fever, dizziness, and calf pain. Patient confirmed post-op appointment with surgeon on 6/23 at 9:45AM .Patient does not have any other questions or concerns at this time. Pt was encouraged to call with any questions, concerns or issues.

## 2025-06-13 ENCOUNTER — OFFICE VISIT (OUTPATIENT)
Dept: PHYSICAL THERAPY | Age: 70
End: 2025-06-13
Attending: ORTHOPAEDIC SURGERY
Payer: MEDICARE

## 2025-06-13 DIAGNOSIS — M16.11 PRIMARY OSTEOARTHRITIS OF RIGHT HIP: Primary | ICD-10-CM

## 2025-06-13 DIAGNOSIS — Z96.641 S/P TOTAL RIGHT HIP ARTHROPLASTY: ICD-10-CM

## 2025-06-13 PROCEDURE — 97110 THERAPEUTIC EXERCISES: CPT

## 2025-06-13 PROCEDURE — 97112 NEUROMUSCULAR REEDUCATION: CPT

## 2025-06-13 NOTE — PROGRESS NOTES
"Daily Note     Today's date: 2025  Patient name: Debbi Major  : 1955  MRN: [id number]  Referring provider: Terry Alvarado DO  Dx:   Encounter Diagnosis     ICD-10-CM    1. Primary osteoarthritis of right hip  M16.11       2. S/P total right hip arthroplasty  Z96.641                      Subjective: Notes her hip is bothering her.       Objective: See treatment diary below      Assessment: Slow guarded movements, required assistance moving R LE with most exercises. Emphasized the importance of early mobility. Limited progressions secondary to decreased tolerance. Increased time spent on education regarding healing process and recovery time. Patient offers no complaints post session, would benefit from continued PT.       Plan: Continue per plan of care.        POC expires Unit limit Auth Expiration date PT/OT + Visit Limit?   25 BOMN  BOMN                           Visit/Unit Tracking  AUTH Status:  Date             No auth needed  Used 1  2 3             Remaining   RE  EVAL              FOTO                     Precautions: R THR - posterior hip precautions      Manuals                                                   [employer] HEP issued            Neuro Re-Ed                                                                                                        Ther Ex             Ankle pumps 20x 30x           Supine heel slides A  15x 20x           Glute set 5\"/10x 5\"x10x           Ball adductor set HL 3\"/15x 3\" 30x           Quad set 5\"/15x 5\" 30x           LAQs 10x 30x                        Standing bent knee hip flexion 10x 20x           Standing hip ABD 10x 20x           Standing hip EXT 10x 20x           Calf raises 15x 20x                        Nu step   5' l2                                                               Ther Activity             Hip precautions ed PP            Dressing change PP                                      Gait Training 5'          "   Lateral walking at bar  2 laps                        Modalities

## 2025-06-18 ENCOUNTER — OFFICE VISIT (OUTPATIENT)
Dept: PHYSICAL THERAPY | Age: 70
End: 2025-06-18
Attending: ORTHOPAEDIC SURGERY
Payer: MEDICARE

## 2025-06-18 DIAGNOSIS — M16.11 PRIMARY OSTEOARTHRITIS OF RIGHT HIP: Primary | ICD-10-CM

## 2025-06-18 DIAGNOSIS — Z96.641 S/P TOTAL RIGHT HIP ARTHROPLASTY: ICD-10-CM

## 2025-06-18 PROCEDURE — 97110 THERAPEUTIC EXERCISES: CPT

## 2025-06-18 PROCEDURE — 97112 NEUROMUSCULAR REEDUCATION: CPT

## 2025-06-18 NOTE — PROGRESS NOTES
"Daily Note     Today's date: 2025  Patient name: Debbi Major  : 1955  MRN: [id number]  Referring provider: Terry Alvarado DO  Dx:   Encounter Diagnosis     ICD-10-CM    1. Primary osteoarthritis of right hip  M16.11       2. S/P total right hip arthroplasty  Z96.641           Start Time: 1515  Stop Time: 1600  Total time in clinic (min): 45 minutes    Subjective: Pt has difficulty weight bearing on the R side. She feels sore after last session.       Objective: See treatment diary below      Assessment: Added supine SLRs with assistance. Continued current POC, encouraging the patient to WB on the right leg, she was able to accept weight on the R for a couple repetitions with extensions and abduction. Tolerated treatment well. Patient demonstrated fatigue post treatment      Plan: Continue per plan of care.        POC expires Unit limit Auth Expiration date PT/OT + Visit Limit?   25 BOMN  BOMN                           Visit/Unit Tracking  AUTH Status:  Date            No auth needed  Used 1  2 3 4            Remaining   RE  EVAL              FOTO                     Precautions: R THR - posterior hip precautions      Manuals                                                  [employer] HEP issued            Neuro Re-Ed                                                                                                        Ther Ex             Ankle pumps 20x 30x 30x          Supine heel slides A  15x 20x 20x          Glute set 5\"/10x 5\"x10x 10\"x10           Ball adductor set HL 3\"/15x 3\" 30x 3\"x30x           Quad set 5\"/15x 5\" 30x 10\"x10           LAQs 10x 30x 30x                        Standing bent knee hip flexion 10x 20x Marching 20x           Standing hip ABD 10x 20x 20x ea          Standing hip EXT 10x 20x 30x           Calf raises 15x 20x 20x          Mini squats             Nu step   5' l2 5'                                                               Ther " Activity             Hip precautions ed PP            Dressing change PP                                      Gait Training 5'            Lateral walking at bar  2 laps 5 laps                        Modalities

## 2025-06-20 ENCOUNTER — OFFICE VISIT (OUTPATIENT)
Dept: PHYSICAL THERAPY | Age: 70
End: 2025-06-20
Attending: ORTHOPAEDIC SURGERY
Payer: MEDICARE

## 2025-06-20 DIAGNOSIS — Z96.641 S/P TOTAL RIGHT HIP ARTHROPLASTY: ICD-10-CM

## 2025-06-20 DIAGNOSIS — M16.11 PRIMARY OSTEOARTHRITIS OF RIGHT HIP: Primary | ICD-10-CM

## 2025-06-20 PROCEDURE — 97110 THERAPEUTIC EXERCISES: CPT

## 2025-06-20 NOTE — PROGRESS NOTES
Daily Note     Today's date: 2025  Patient name: Debbi Major  : 1955  MRN: [id number]  Referring provider: Terry Alvarado DO  Dx:   Encounter Diagnosis     ICD-10-CM    1. Primary osteoarthritis of right hip  M16.11       2. S/P total right hip arthroplasty  Z96.641           Start Time: 1400  Stop Time: 1445  Total time in clinic (min): 45 minutes    Subjective: Pt has improved weight bearing tolerance. She requested discussing the surgical procedure and the stages of healing. She has been walking around her house with no AD.       Objective: See treatment diary below      Assessment: After reviewing the topics in the subjective the patient felt reassured of her progress. Added squats and ambulating with a quad cane. She has multiple AD at home so I provided education on fitting a device to her. Tolerated treatment well. Patient demonstrated fatigue post treatment      Plan: Continue per plan of care.        POC expires Unit limit Auth Expiration date PT/OT + Visit Limit?   25 BOMN  BOMN                           Visit/Unit Tracking  AUTH Status:  Date           No auth needed  Used 1  2 3 4 5           Remaining   RE  EVAL              FOTO                     Precautions: R THR - posterior hip precautions  Pt presents following a total hip arthroplasty of the R/L, posterior approach. For the first four weeks patient is to avoid these motions as to prevent dislocation and ensure proper healing of the prosthesis:  - hip flexion past 90*  - hip internal rotation  - hip adduction     Discussed with the patient that specific surgeons maintain precautions up to a year at times. Most importantly educated them to avoid motions that would pair hip flexion, IR, and ADD together, increasing the risk of injury.        Manuals                                                 [employer] HEP issued            Neuro Re-Ed                                                  "                                                       Ther Ex             Ankle pumps 20x 30x 30x          Supine heel slides A  15x 20x 20x 20x         Glute set 5\"/10x 5\"x10x 10\"x10  10\"x20         Ball adductor set HL 3\"/15x 3\" 30x 3\"x30x  x30         Hip abd    Rtb x30          Quad set 5\"/15x 5\" 30x 10\"x10  10\"x20          LAQs 10x 30x 30x  2/30x                       Standing bent knee hip flexion 10x 20x Marching 20x  20x          Standing hip ABD 10x 20x 20x ea X20          Standing hip EXT 10x 20x 30x           Calf raises 15x 20x 20x 20x          Mini squats    2x10         Nu step   5' l2 5'  5'         squats    2x10         Standing slr    X10                                   Ther Activity             Hip precautions ed PP            Dressing change PP                                      Gait Training 5'            Lateral walking at bar  2 laps 5 laps  5 laps                       Modalities                                                "

## 2025-06-23 ENCOUNTER — OFFICE VISIT (OUTPATIENT)
Dept: OBGYN CLINIC | Facility: CLINIC | Age: 70
End: 2025-06-23

## 2025-06-23 VITALS — HEIGHT: 63 IN | BODY MASS INDEX: 24.8 KG/M2 | WEIGHT: 140 LBS

## 2025-06-23 DIAGNOSIS — Z96.641 S/P TOTAL RIGHT HIP ARTHROPLASTY: Primary | ICD-10-CM

## 2025-06-23 PROCEDURE — 99024 POSTOP FOLLOW-UP VISIT: CPT | Performed by: ORTHOPAEDIC SURGERY

## 2025-06-23 NOTE — PROGRESS NOTES
Assessment & Plan  S/P total right hip arthroplasty  Reviewed today's physical exam findings and x-ray findings with patient at time of visit   X-rays demonstrate a stable total hip prosthesis   Completed prophylactic DVT injection series, transition to asprin 325mg once/day for next 4 weeks  Continue PT as per protocol  Continue dietary supplementation for additional 4 weeks, and then discontinue  Reviewed hip precautions with patient at time of visit  Will be seen in 4 weeks for 6-week follow-up evaluation at which time range of motion and strength will be assessed  Should any questions or concerns arise she can contact the office  Patient expresses understanding and is in agreement with this treatment plan  Orders:    XR hip/pelv 2-3 vws right if performed; Future      The patient is doing quite well from a right total hip replacement.  She is ambulating with a single-point cane.  Incision clean and dry.  Leg lengths intact.  Continue to precautions.  Follow-up in 1 month for strength and motion check.  If her condition changes, she will not hesitate to let us know    Subjective:   Patient ID: Debbi Major  1955     HPI  Patient is a 70 y.o. female who presents for 2-week follow-up evaluation s/p right MELANIA performed 6/4/2025. Patient reports that she has been consistent with formal physical therapy as per protocol. She has completed her course of injectable DVT prophylaxis. On today's presentation she reports generalized soreness and difficulty with sleep postoperatively. She denies any new onset bruising, swelling, numbness, tingling, feelings of instability, or mechanical symptoms. She also denies any recent fever, chills, headache, nausea, chills, malaise.    The following portions of the patient's history were reviewed and updated as appropriate:  Past medical history, past surgical history, Family history, social history, current medications and allergies    Past Medical History[1]    Past Surgical  "History[2]    Family History[3]    Social History[4]    Current Medications[5]    Allergies   Allergen Reactions    Sulfa Antibiotics Hives       Review of Systems   Constitutional:  Negative for chills, fatigue and fever.   Gastrointestinal:  Negative for nausea.   Musculoskeletal:         As noted in HPI   Neurological:  Negative for dizziness, numbness and headaches.   All other systems reviewed and are negative.       Objective:  Ht 5' 3\" (1.6 m)   Wt 63.5 kg (140 lb)   BMI 24.80 kg/m²     Ortho Exam  Right hip -   Weight-bearing status: Full WBAT without use of assistive device  Incision(s): Clean, dry, and healing -edges well-approximated with staples -Staples removed at time of visit without difficulty and Steri-Strips applied -no signs of drainage or dehiscence  Skin is warm and dry with no signs of erythema, ecchymosis, or infection  Mild generalized soft tissue swelling expected postoperatively  ROM: Smooth passive circumduction without pain exacerbation  Strength: Is able to demonstrate active hip flexion, active knee flexion and extension, active hip abduction/adduction in seated position  Calf compartments are soft  - Angel's sign  2+ TP and DP pulses with brisk capillary refill to the toes  Sural, saphenous, tibial, superficial and deep peroneal motor and sensory distributions intact  Sensation light touch intact distally    Physical Exam  Vitals and nursing note reviewed.   Constitutional:       General: She is not in acute distress.     Appearance: She is well-developed.   HENT:      Head: Normocephalic and atraumatic.     Eyes:      Conjunctiva/sclera: Conjunctivae normal.       Cardiovascular:      Rate and Rhythm: Normal rate.   Pulmonary:      Effort: Pulmonary effort is normal.     Musculoskeletal:      Cervical back: Neck supple.     Skin:     General: Skin is warm and dry.      Capillary Refill: Capillary refill takes less than 2 seconds.     Neurological:      Mental Status: She is alert " and oriented to person, place, and time.     Psychiatric:         Mood and Affect: Mood normal.         Behavior: Behavior normal.          Diagnostic Test Review:  Attending Physician has personally reviewed pertinent imaging in PACS, impression is as follows:    Review of radiographic series taken 2025 of the AP pelvis and right hip shows well-seated total hip prosthesis in maintained alignment without signs of loosening.    Procedures   Procedures performed this visit    Scribe Attestation      I,:  Lazaro Sawant am acting as a scribe while in the presence of the attending physician.:       I,:  Terry Alvarado DO personally performed the services described in this documentation    as scribed in my presence.:                   [1]   Past Medical History:  Diagnosis Date    Colon polyp     Depression     Hyperlipidemia     last assessed - 2016    Hyperparathyroidism (HCC) 2018    Wears glasses    [2]   Past Surgical History:  Procedure Laterality Date     SECTION      COLONOSCOPY      IA ARTHRP ACETBLR/PROX FEM PROSTC AGRFT/ALGRFT Right 2025    Procedure: RIGHT TOTAL HIP ARTHROPLASTY;  Surgeon: Terry Alvarado DO;  Location: [facility];  Service: Orthopedics    IA PARATHYROIDECTOMY/EXPLORATION PARATHYROIDS Right 2023    Procedure: MINIMALLY INVASIVE RIGHT PARATHYROIDECTOMY;  Surgeon: Davon Garrido MD;  Location:  MAIN OR;  Service: Surgical Oncology    US GUIDED THYROID BIOPSY  2022   [3]   Family History  Problem Relation Name Age of Onset    Cancer Mother      Cancer Father      Cancer Sister      Breast cancer Sister      No Known Problems Sister      No Known Problems Sister      No Known Problems Sister      No Known Problems Sister      No Known Problems Sister      No Known Problems Sister      No Known Problems Maternal Grandmother      No Known Problems Paternal Grandmother      No Known Problems Maternal Aunt      No Known Problems Maternal Aunt      No  Known Problems Maternal Aunt      No Known Problems Paternal Aunt      No Known Problems Paternal Aunt     [4]   Social History  Socioeconomic History    Marital status: /Civil Union   Tobacco Use    Smoking status: Every Day     Current packs/day: 0.50     Average packs/day: 0.5 packs/day for 30.0 years (15.0 ttl pk-yrs)     Types: Cigarettes    Smokeless tobacco: Never   Vaping Use    Vaping status: Never Used   Substance and Sexual Activity    Alcohol use: Not Currently    Drug use: No   Social History Narrative    Stress at home     Social Drivers of Health     Financial Resource Strain: Low Risk  (10/4/2023)    Overall Financial Resource Strain (CARDIA)     Difficulty of Paying Living Expenses: Not very hard   Food Insecurity: Patient Declined (6/4/2025)    Nursing - Inadequate Food Risk Classification     Worried About Running Out of Food in the Last Year: Patient declined     Ran Out of Food in the Last Year: Patient declined     Ran Out of Food in the Last Year: Never true   Transportation Needs: No Transportation Needs (6/4/2025)    Nursing - Transportation Risk Classification     Lack of Transportation: No   Intimate Partner Violence: Unknown (6/4/2025)    Nursing IPS     Physically Hurt by Someone: No     Hurt or Threatened by Someone: No   Housing Stability: Unknown (6/4/2025)    Nursing: Inadequate Housing Risk Classification     Unable to Pay for Housing in the Last Year: No     Has Housing: No   [5]   Current Outpatient Medications:     alendronate (FOSAMAX) 70 mg tablet, TAKE 1 TABLET BY MOUTH EVERY 7 DAYS, Disp: 12 tablet, Rfl: 1    ascorbic acid (VITAMIN C) 250 mg tablet, Take 2 tablets (500 mg total) by mouth daily, Disp: 60 tablet, Rfl: 0    atorvastatin (LIPITOR) 20 mg tablet, TAKE ONE TABLET BY MOUTH DAILY (Patient taking differently: Take 20 mg by mouth in the evening. Take before meals), Disp: 30 tablet, Rfl: 5    Cholecalciferol 50 MCG (2000 UT) CAPS, Take by mouth in the morning.,  Disp: , Rfl:     docusate sodium (COLACE) 100 mg capsule, Take 1 capsule (100 mg total) by mouth 2 (two) times a day, Disp: 60 capsule, Rfl: 0    enoxaparin (LOVENOX) 40 mg/0.4 mL, Inject 0.4 mL (40 mg total) under the skin in the morning for 14 days, Disp: 5.6 mL, Rfl: 0    ferrous sulfate 324 (65 Fe) mg, Take 1 tablet (324 mg total) by mouth 2 (two) times a day before meals, Disp: 60 tablet, Rfl: 0    fluticasone (FLONASE) 50 mcg/act nasal spray, 2 sprays into each nostril if needed, Disp: , Rfl:     folic acid (FOLVITE) 400 mcg tablet, Take 1 tablet (400 mcg total) by mouth daily, Disp: 30 tablet, Rfl: 0    LORazepam (Ativan) 0.5 mg tablet, Take 1 tablet (0.5 mg total) by mouth every 8 (eight) hours as needed for anxiety, Disp: 15 tablet, Rfl: 0    timolol (TIMOPTIC) 0.5 % ophthalmic solution, Administer 1 drop to both eyes in the morning and 1 drop in the evening., Disp: , Rfl:     Wheat Dextrin (BENEFIBER PO), Take by mouth, Disp: , Rfl:     Calcium Carbonate-Vit D-Min (CALCIUM 1200 PO), Take by mouth (Patient not taking: Reported on 6/23/2025), Disp: , Rfl:     Multiple Vitamin (multivitamin) capsule, Take 1 capsule by mouth daily (Patient not taking: Reported on 6/23/2025), Disp: , Rfl:     Multiple Vitamin (multivitamin) tablet, Take 1 tablet by mouth daily, Disp: 30 tablet, Rfl: 0

## 2025-06-23 NOTE — PATIENT INSTRUCTIONS
Discussed with patient that she is to continue formal physical therapy as per biceps distal right MELANIA protocol  Aspirin 325 mg once per day for DVT prophylaxis for 4 weeks from today  Continue dietary vitamin supplementation regiment for additional 4 weeks from today

## 2025-06-24 ENCOUNTER — OFFICE VISIT (OUTPATIENT)
Dept: PHYSICAL THERAPY | Age: 70
End: 2025-06-24
Attending: ORTHOPAEDIC SURGERY
Payer: MEDICARE

## 2025-06-24 DIAGNOSIS — Z96.641 S/P TOTAL RIGHT HIP ARTHROPLASTY: ICD-10-CM

## 2025-06-24 DIAGNOSIS — M16.11 PRIMARY OSTEOARTHRITIS OF RIGHT HIP: Primary | ICD-10-CM

## 2025-06-24 PROCEDURE — 97110 THERAPEUTIC EXERCISES: CPT

## 2025-06-24 NOTE — PROGRESS NOTES
"Daily Note     Today's date: 2025  Patient name: Debbi Major  : 1955  MRN: [id number]  Referring provider: Terry Alvarado DO  Dx:   Encounter Diagnosis     ICD-10-CM    1. Primary osteoarthritis of right hip  M16.11       2. S/P total right hip arthroplasty  Z96.641           Start Time: 09  Stop Time: 1015  Total time in clinic (min): 45 minutes    Subjective: Reports no new concerns, some improvement overall       Objective: See treatment diary below  Patient was seen 1:1 for 30 min.     Assessment: Added FW step ups on 4\" step with appropriate challenge. Cues for foot placement and heel drive for better glute activation. Cues for carryover of program and to ensure proper dosage is completed. Patient would benefit from continued PT      Plan: Continue per plan of care.        POC expires Unit limit Auth Expiration date PT/OT + Visit Limit?   25 BOMN  BOMN                           Visit/Unit Tracking  AUTH Status:  Date          No auth needed  Used 1  2 3 4 5 6          Remaining   RE  EVAL              FOTO                     Precautions: R THR - posterior hip precautions  Pt presents following a total hip arthroplasty of the R/L, posterior approach. For the first four weeks patient is to avoid these motions as to prevent dislocation and ensure proper healing of the prosthesis:  - hip flexion past 90*  - hip internal rotation  - hip adduction     Discussed with the patient that specific surgeons maintain precautions up to a year at times. Most importantly educated them to avoid motions that would pair hip flexion, IR, and ADD together, increasing the risk of injury.        Manuals                                                [employer] HEP issued            Neuro Re-Ed                                                                                                        Ther Ex             Ankle pumps 20x 30x 30x          Supine heel " "slides A  15x 20x 20x 20x 30x         Glute set 5\"/10x 5\"x10x 10\"x10  10\"x20 20x         Ball adductor set HL 3\"/15x 3\" 30x 3\"x30x  x30 30x         Hip abd    Rtb x30  RTB 30x         Quad set 5\"/15x 5\" 30x 10\"x10  10\"x20  10\"x20         LAQs 10x 30x 30x  2/30x  2# 30x                     Standing bent knee hip flexion 10x 20x Marching 20x  20x  20x        Standing hip ABD 10x 20x 20x ea X20  20x         Standing hip EXT 10x 20x 30x   20x        Calf raises 15x 20x 20x 20x  30x        Mini squats    2x10 20x         Nu step   5' l2 5'  5' 5'         Standing slr    X10 2x10                                   Ther Activity             Hip precautions ed PP            Dressing change PP    4\" 2x10 ea         Step ups                           Gait Training 5'            Lateral walking at bar  2 laps 5 laps  5 laps  5 laps                      Modalities                                                  "

## 2025-06-27 ENCOUNTER — OFFICE VISIT (OUTPATIENT)
Dept: PHYSICAL THERAPY | Age: 70
End: 2025-06-27
Attending: ORTHOPAEDIC SURGERY
Payer: MEDICARE

## 2025-06-27 DIAGNOSIS — Z96.641 S/P TOTAL RIGHT HIP ARTHROPLASTY: ICD-10-CM

## 2025-06-27 DIAGNOSIS — M16.11 PRIMARY OSTEOARTHRITIS OF RIGHT HIP: Primary | ICD-10-CM

## 2025-06-27 PROCEDURE — 97110 THERAPEUTIC EXERCISES: CPT

## 2025-06-27 NOTE — PROGRESS NOTES
Daily Note     Today's date: 2025  Patient name: Debbi Major  : 1955  MRN: [id number]  Referring provider: Terry Alvarado DO  Dx:   Encounter Diagnosis     ICD-10-CM    1. Primary osteoarthritis of right hip  M16.11       2. S/P total right hip arthroplasty  Z96.641           Start Time: 1015  Stop Time: 1100  Total time in clinic (min): 45 minutes    Subjective: Pt presents to therapy with her a wide base SPC. She has no new complaints. She had her stitches out and that has improved the discomfort surrounding the incision.       Objective: See treatment diary below      Assessment: Added serpentine walking with no AD and sit to stands. She was minimally challenged by these interventions. Progressed POC by eliminating UE.  Tolerated treatment well. Patient demonstrated fatigue post treatment      Plan: Continue per plan of care.        POC expires Unit limit Auth Expiration date PT/OT + Visit Limit?   25 BOMN  BOMN                           Visit/Unit Tracking  AUTH Status:  Date         No auth needed  Used 1  2 3 4 5 6 7         Remaining   RE  EVAL              FOTO                     Precautions: R THR - posterior hip precautions  Pt presents following a total hip arthroplasty of the R/L, posterior approach. For the first four weeks patient is to avoid these motions as to prevent dislocation and ensure proper healing of the prosthesis:  - hip flexion past 90*  - hip internal rotation  - hip adduction     Discussed with the patient that specific surgeons maintain precautions up to a year at times. Most importantly educated them to avoid motions that would pair hip flexion, IR, and ADD together, increasing the risk of injury.        Manuals                                               [employer] HEP issued            Neuro Re-Ed             Cones taps                                                                                    "         Ther Ex             Ankle pumps 20x 30x 30x          Supine heel slides A  15x 20x 20x 20x 30x         Glute set 5\"/10x 5\"x10x 10\"x10  10\"x20 20x         Ball adductor set HL 3\"/15x 3\" 30x 3\"x30x  x30 30x         Hip abd    Rtb x30  RTB 30x         Quad set 5\"/15x 5\" 30x 10\"x10  10\"x20  10\"x20         LAQs 10x 30x 30x  2/30x  2# 30x 3# 30x                     Standing bent knee hip flexion 10x 20x Marching 20x  20x  20x 20x ea 2\" hold        Standing hip ABD 10x 20x 20x ea X20  20x  20x        Standing hip EXT 10x 20x 30x   20x        Calf raises 15x 20x 20x 20x  30x        Mini squats    2x10 20x         Nu step   5' l2 5'  5' 5'  5'        Standing slr    X10 2x10  2x10 ea                                  Ther Activity             Hip precautions ed PP            Dressing change PP    4\" 2x10 ea         Step ups       4\" x20        Sit to stands      3x5 standard        Gait Training 5'            Lateral walking at bar  2 laps 5 laps  5 laps  5 laps                      Modalities                                                    "

## 2025-07-01 ENCOUNTER — OFFICE VISIT (OUTPATIENT)
Dept: PHYSICAL THERAPY | Age: 70
End: 2025-07-01
Attending: ORTHOPAEDIC SURGERY
Payer: MEDICARE

## 2025-07-01 DIAGNOSIS — Z96.641 S/P TOTAL RIGHT HIP ARTHROPLASTY: ICD-10-CM

## 2025-07-01 DIAGNOSIS — M16.11 PRIMARY OSTEOARTHRITIS OF RIGHT HIP: Primary | ICD-10-CM

## 2025-07-01 PROCEDURE — 97530 THERAPEUTIC ACTIVITIES: CPT

## 2025-07-01 PROCEDURE — 97110 THERAPEUTIC EXERCISES: CPT

## 2025-07-01 NOTE — PROGRESS NOTES
Daily Note     Today's date: 2025  Patient name: Debbi Major  : 1955  MRN: [id number]  Referring provider: Terry Alvarado DO  Dx:   Encounter Diagnosis     ICD-10-CM    1. Primary osteoarthritis of right hip  M16.11       2. S/P total right hip arthroplasty  Z96.641           Start Time: 1115  Stop Time: 1200  Total time in clinic (min): 45 minutes    Subjective: Reports improvement overall with minimal pain except at night when trying to sleep at night.      Objective: See treatment diary below      Assessment: Increased reps for standing exercises as documented below with appropriate challenge. Continued weakness noted with SLR, requiring assistance t/o the range to complete all reps.Occasional rest breaks t/o session due to fatigue.  Patient would benefit from continued PT.      Plan: Continue per plan of care.        POC expires Unit limit Auth Expiration date PT/OT + Visit Limit?   25 BOMN  BOMN                           Visit/Unit Tracking  AUTH Status:  Date        No auth needed  Used 1  2 3 4 5 6 7 8        Remaining   RE  EVAL              FOTO                     Precautions: R THR - posterior hip precautions  Pt presents following a total hip arthroplasty of the R/L, posterior approach. For the first four weeks patient is to avoid these motions as to prevent dislocation and ensure proper healing of the prosthesis:  - hip flexion past 90*  - hip internal rotation  - hip adduction     Discussed with the patient that specific surgeons maintain precautions up to a year at times. Most importantly educated them to avoid motions that would pair hip flexion, IR, and ADD together, increasing the risk of injury.        Manuals                                              [employer] HEP issued            Neuro Re-Ed             Cones taps        NV                                                                                   "   Ther Ex             Ankle pumps 20x 30x 30x          Supine heel slides A  15x 20x 20x 20x 30x         Glute set 5\"/10x 5\"x10x 10\"x10  10\"x20 20x         Ball adductor set HL 3\"/15x 3\" 30x 3\"x30x  x30 30x   30x       Hip abd    Rtb x30  RTB 30x   GTB 30x       Quad set 5\"/15x 5\" 30x 10\"x10  10\"x20  10\"x20         LAQs 10x 30x 30x  2/30x  2# 30x 3# 30x  3# 30x                    Standing bent knee hip flexion 10x 20x Marching 20x  20x  20x 20x ea 2\" hold  March 2\"x30      Standing hip ABD 10x 20x 20x ea X20  20x  20x  3x10       Standing hip EXT 10x 20x 30x   20x  30x      Calf raises 15x 20x 20x 20x  30x  30x       Mini squats    2x10 20x   20x      Nu step   5' l2 5'  5' 5'  5'  L5 5'      Standing slr    X10 2x10  2x10 ea  2x10 ea                                Ther Activity             Hip precautions ed PP            Dressing change PP    4\" 2x10 ea         Step ups       4\" x20  6\" 20x       Sit to stands      3x5 standard  3x5 STS      Gait Training 5'            Lateral walking at bar  2 laps 5 laps  5 laps  5 laps   5 laps                   Modalities                                                      "

## 2025-07-02 ENCOUNTER — OFFICE VISIT (OUTPATIENT)
Dept: ENDOCRINOLOGY | Facility: CLINIC | Age: 70
End: 2025-07-02
Payer: MEDICARE

## 2025-07-02 VITALS
SYSTOLIC BLOOD PRESSURE: 118 MMHG | DIASTOLIC BLOOD PRESSURE: 78 MMHG | HEIGHT: 63 IN | BODY MASS INDEX: 25.34 KG/M2 | WEIGHT: 143 LBS | OXYGEN SATURATION: 98 % | TEMPERATURE: 97.5 F | HEART RATE: 69 BPM

## 2025-07-02 DIAGNOSIS — Z98.890 STATUS POST PARATHYROIDECTOMY: ICD-10-CM

## 2025-07-02 DIAGNOSIS — M81.8 OTHER OSTEOPOROSIS WITHOUT CURRENT PATHOLOGICAL FRACTURE: Primary | ICD-10-CM

## 2025-07-02 DIAGNOSIS — E04.2 MULTINODULAR THYROID: ICD-10-CM

## 2025-07-02 DIAGNOSIS — Z90.89 STATUS POST PARATHYROIDECTOMY: ICD-10-CM

## 2025-07-02 PROCEDURE — 99214 OFFICE O/P EST MOD 30 MIN: CPT | Performed by: NURSE PRACTITIONER

## 2025-07-02 PROCEDURE — G2211 COMPLEX E/M VISIT ADD ON: HCPCS | Performed by: NURSE PRACTITIONER

## 2025-07-02 RX ORDER — ASPIRIN 325 MG
325 TABLET ORAL DAILY
COMMUNITY

## 2025-07-02 NOTE — ASSESSMENT & PLAN NOTE
Denies compressive symptoms. Next thyroid US recommended for December 2026.   Orders:    TSH, 3rd generation with Free T4 reflex; Future

## 2025-07-02 NOTE — ASSESSMENT & PLAN NOTE
Discussed the role of Fosamax in strengthening the hip and improving surgical outcomes. Mentioned the need to evaluate the continued use of Fosamax with a DEXA scan.  Continue Fosamax until the DEXA scan is completed and results are reviewed.     Post-hip replacement status: Resolving.  Experiencing fatigue, difficulty sleeping, and challenges with mobility.  Discussed the importance of patience during recovery and the need for continued physical therapy. Reviewed the patient's vitamin D and calcium levels, which are within normal range.  Orders:    DXA bone density spine hip and pelvis; Future

## 2025-07-02 NOTE — ASSESSMENT & PLAN NOTE
Hypercalcemia: Resolved.   Calcium levels are within normal range after discontinuing excess calcium supplements.   Reviewed dietary sources of calcium and confirmed adequate intake through diet and multivitamins.  No additional calcium supplements needed at this time.

## 2025-07-02 NOTE — PROGRESS NOTES
Name: Debbi Major      : 1955      MRN: [id number]  Encounter Provider: JAVON Albert  Encounter Date: 2025   Encounter department: [facility] [place]  :  Assessment & Plan  Other osteoporosis without current pathological fracture  Discussed the role of Fosamax in strengthening the hip and improving surgical outcomes. Mentioned the need to evaluate the continued use of Fosamax with a DEXA scan.  Continue Fosamax until the DEXA scan is completed and results are reviewed.     Post-hip replacement status: Resolving.  Experiencing fatigue, difficulty sleeping, and challenges with mobility.  Discussed the importance of patience during recovery and the need for continued physical therapy. Reviewed the patient's vitamin D and calcium levels, which are within normal range.  Orders:    DXA bone density spine hip and pelvis; Future    Multinodular thyroid  Denies compressive symptoms. Next thyroid US recommended for 2026.   Orders:    TSH, 3rd generation with Free T4 reflex; Future    Status post parathyroidectomy   Hypercalcemia: Resolved.   Calcium levels are within normal range after discontinuing excess calcium supplements.   Reviewed dietary sources of calcium and confirmed adequate intake through diet and multivitamins.  No additional calcium supplements needed at this time.         Assessment & Plan    Follow-up: The patient will follow up in 6 months.      History of Present Illness   History of Present Illness  Debbi Major is a 70 y.o. female with primary hyperparathyroidism s/p parathyroidectomy, thyroid nodules, and age-related osteoprosis presenting today for follow up.    To review:    For primary hyperparathyroidism, patient underwent a minimally invasive right parathyroidectomy on 1/3/2023. Serum Ca+ from 2025 is stable at 8.8 mg/dL.     For thyroid nodules, she completed a fine-needle aspiration biopsy of the left lower pole  thyroid nodule measuring 2.9 x 2.5 x 2.2 cm on 6/9/2022. Results were benign-[place] category 2. She completed her last US on 12/27/2024 which demonstrated stable nodules. Repeat thyroid US recommended for 2026.     For osteoporosis, she is taking 70 mg of alendronate once weekly. Denies side effects. Denies any recent falls or fractures. She is due for a repeat DEXA scan.    She underwent a hip replacement surgery on 06/04/2025. The initial week post-surgery was challenging due to severe pain and difficulty in mobility, including going to the bathroom. She experienced sleep disturbances as she could not lie on her right side and found it difficult to sleep on her back. She also had frequent urination at night. She resorted to using a recliner for sleep for over a week but has since returned to her bed. She had to use pull-ups due to incontinence. She is currently undergoing physical therapy and is cautious about falling, especially around her dogs. She is able to move without the aid of a walker or cane but experiences fatigue and lack of motivation. She took oxycodone for pain management, which led to constipation, taking her 5 days to have a bowel movement. She was prescribed Colace, which she takes once daily, and Benefiber, which she discontinued due to lack of noticeable improvement. She had a colonoscopy in 12/2024, during which she was advised to take Benefiber. She is currently taking a multivitamin and vitamin D3 2000 units daily. Prior to her surgery, she had elevated calcium levels due to excessive intake of Lipitor and calcium supplements, which were subsequently discontinued.           Review of Systems   Constitutional:  Positive for fatigue. Negative for activity change, appetite change and unexpected weight change.   HENT:  Negative for dental problem, sore throat, trouble swallowing and voice change.    Eyes:  Negative for visual disturbance.   Respiratory:  Negative for cough, chest tightness and  "shortness of breath.    Cardiovascular:  Negative for chest pain, palpitations and leg swelling.   Gastrointestinal:  Negative for constipation, diarrhea, nausea and vomiting.   Endocrine: Negative for cold intolerance, heat intolerance, polydipsia, polyphagia and polyuria.   Genitourinary:  Negative for frequency.   Musculoskeletal:  Positive for arthralgias. Negative for back pain, gait problem and myalgias.   Skin:  Negative for wound.   Allergic/Immunologic: Positive for environmental allergies. Negative for food allergies.   Neurological:  Negative for dizziness, weakness, light-headedness, numbness and headaches.   Psychiatric/Behavioral:  Positive for dysphoric mood. Negative for decreased concentration and sleep disturbance. The patient is nervous/anxious.           Objective   /78 (BP Location: Right arm, Patient Position: Sitting, Cuff Size: Large)   Pulse 69   Temp 97.5 °F (36.4 °C) (Temporal)   Ht 5' 3\" (1.6 m)   Wt 64.9 kg (143 lb)   SpO2 98%   BMI 25.33 kg/m²      Physical Exam  Vitals reviewed.   Constitutional:       General: She is not in acute distress.     Appearance: She is well-developed. She is not ill-appearing.   HENT:      Head: Normocephalic and atraumatic.     Eyes:      Conjunctiva/sclera: Conjunctivae normal.     Neck:      Thyroid: No thyroid mass, thyromegaly or thyroid tenderness.     Cardiovascular:      Rate and Rhythm: Normal rate and regular rhythm.      Pulses: Normal pulses.      Heart sounds: Normal heart sounds. No murmur heard.  Pulmonary:      Effort: Pulmonary effort is normal. No respiratory distress.      Breath sounds: Normal breath sounds.   Abdominal:      Palpations: Abdomen is soft.      Tenderness: There is no abdominal tenderness.     Musculoskeletal:         General: No swelling.      Cervical back: Normal range of motion and neck supple.      Right lower leg: No edema.      Left lower leg: No edema.     Skin:     General: Skin is warm and dry.      " Capillary Refill: Capillary refill takes less than 2 seconds.     Neurological:      Mental Status: She is alert.     Psychiatric:         Mood and Affect: Mood normal.       Physical Exam  Respiratory: Lung auscultation revealed normal breath sounds.    Results

## 2025-07-03 ENCOUNTER — OFFICE VISIT (OUTPATIENT)
Dept: PHYSICAL THERAPY | Age: 70
End: 2025-07-03
Attending: ORTHOPAEDIC SURGERY
Payer: MEDICARE

## 2025-07-03 DIAGNOSIS — Z96.641 S/P TOTAL RIGHT HIP ARTHROPLASTY: Primary | ICD-10-CM

## 2025-07-03 PROCEDURE — 97110 THERAPEUTIC EXERCISES: CPT

## 2025-07-03 NOTE — PROGRESS NOTES
Daily Note     Today's date: 7/3/2025  Patient name: Debbi Major  : 1955  MRN: [id number]  Referring provider: Terry Alvarado DO  Dx:   Encounter Diagnosis     ICD-10-CM    1. S/P total right hip arthroplasty  Z96.641           Start Time: 1115  Stop Time: 1200  Total time in clinic (min): 45 minutes    Subjective: Pt is frustrated because she cannot ow her grass; although she presents with no AD walking upright.       Objective: See treatment diary below      Assessment: Advanced from nustep to TM walking as a warm up. Added SL cones taps, hurdles, seated marching and walking over foam mat. Added these interventions to simulate walking on grass and to improve car transfers. Pt was appropriately challenged. Tolerated treatment well. Patient demonstrated fatigue post treatment      Plan: Continue per plan of care.        POC expires Unit limit Auth Expiration date PT/OT + Visit Limit?   25 BOMN  BOMN                           Visit/Unit Tracking  AUTH Status:  Date 5/5 6/9 6/13 6/18 6/20 6/24 6/27 7/1 7/3      No auth needed  Used 1  2 3 4 5 6 7 8 9       Remaining   RE  EVAL              FOTO                     Precautions: R THR - posterior hip precautions  Pt presents following a total hip arthroplasty of the R/L, posterior approach. For the first four weeks patient is to avoid these motions as to prevent dislocation and ensure proper healing of the prosthesis:  - hip flexion past 90*  - hip internal rotation  - hip adduction     Discussed with the patient that specific surgeons maintain precautions up to a year at times. Most importantly educated them to avoid motions that would pair hip flexion, IR, and ADD together, increasing the risk of injury.        Manuals 6/9 6/13 6/18 6/20 6/24 6/27 7/1 7/3                                            [employer] HEP issued            Neuro Re-Ed             Cones taps        NV  2x10 ea                                                                      "               Ther Ex             Ankle pumps 20x 30x 30x          Supine heel slides A  15x 20x 20x 20x 30x         Glute set 5\"/10x 5\"x10x 10\"x10  10\"x20 20x         Ball adductor set HL 3\"/15x 3\" 30x 3\"x30x  x30 30x   30x       Hip abd    Rtb x30  RTB 30x   GTB 30x       Quad set 5\"/15x 5\" 30x 10\"x10  10\"x20  10\"x20         LAQs 10x 30x 30x  2/30x  2# 30x 3# 30x  3# 30x                    Standing bent knee hip flexion 10x 20x Marching 20x  20x  20x 20x ea 2\" hold  March 2\"x30 March 2\" x30  #2     Standing hip ABD 10x 20x 20x ea X20  20x  20x  3x10  X30      Standing hip EXT 10x 20x 30x   20x  30x      Calf raises 15x 20x 20x 20x  30x  30x       Mini squats    2x10 20x   20x      Nu step   5' l2 5'  5' 5'  5'  L5 5' TM 1 mph 5'      Standing slr    X10 2x10  2x10 ea  2x10 ea 2x10 ea                                Ther Activity             Hip precautions ed PP            Dressing change PP    4\" 2x10 ea         Step ups       4\" x20  6\" 20x  6' 20x  Stairs x5      Sit to stands      3x5 standard  3x5 STS 3x5 STS      Hurdles         X3 non reciprocal/lat      Seated marching over jaqui         To simulate cartransfer x20      Gait Training 5'            Lateral walking at bar  2 laps 5 laps  5 laps  5 laps   5 laps Sidestepping rtb knees 5 laps      Uneven ground on foam mat w/ obstacles         x6     Modalities                                                        "

## 2025-07-07 ENCOUNTER — APPOINTMENT (OUTPATIENT)
Dept: PHYSICAL THERAPY | Age: 70
End: 2025-07-07
Attending: ORTHOPAEDIC SURGERY
Payer: MEDICARE

## 2025-07-09 ENCOUNTER — EVALUATION (OUTPATIENT)
Dept: PHYSICAL THERAPY | Age: 70
End: 2025-07-09
Attending: ORTHOPAEDIC SURGERY
Payer: MEDICARE

## 2025-07-09 DIAGNOSIS — M16.11 PRIMARY OSTEOARTHRITIS OF RIGHT HIP: ICD-10-CM

## 2025-07-09 DIAGNOSIS — Z96.641 S/P TOTAL RIGHT HIP ARTHROPLASTY: Primary | ICD-10-CM

## 2025-07-09 PROCEDURE — 97530 THERAPEUTIC ACTIVITIES: CPT

## 2025-07-09 PROCEDURE — 97110 THERAPEUTIC EXERCISES: CPT

## 2025-07-09 NOTE — HOME EXERCISE EDUCATION
Program_ID:[id number]   Access Code: [email or web address]  URL: [email or web address]  Date: 07-  Prepared By: Bruna Bishop    Program Notes      Exercises      - Sit to Stand - 1 x daily - 7 x weekly - 3 sets - 10 reps      - Seated March - 1 x daily - 7 x weekly - 3 sets - 10 reps      - Supine Active Straight Leg Raise - 1 x daily - 7 x weekly - 3 sets - 10 reps      - Stair Climbing without rail using Step-Through Pattern (Foot Over Foot) - 1 x daily - 7 x weekly - 3 sets - 10 reps

## 2025-07-09 NOTE — HOME EXERCISE EDUCATION
Program_ID:[id number]   Access Code: [email or web address]  URL: [email or web address]  Date: 07-  Prepared By: Bruna Bishop    Program Notes      Exercises      - Sit to Stand - 1 x daily - 7 x weekly - 3 sets - 10 reps      - Seated March - 1 x daily - 7 x weekly - 3 sets - 10 reps      - Supine Active Straight Leg Raise - 1 x daily - 7 x weekly - 3 sets - 10 reps      - Stair Climbing without rail using Step-Through Pattern (Foot Over Foot) - 1 x daily - 7 x weekly - 3 sets - 10 reps      - Single Leg Balance with Clock Reach - 1 x daily - 7 x weekly - 3 sets - 10 reps

## 2025-07-09 NOTE — PROGRESS NOTES
PT Re-Evaluation     Today's date: 2025  Patient name: Debbi Major  : 1955  MRN: [id number]  Referring provider: Terry Alvarado DO  Dx:   Encounter Diagnosis     ICD-10-CM    1. S/P total right hip arthroplasty  Z96.641       2. Primary osteoarthritis of right hip  M16.11             Start Time: 1500  Stop Time: 1545  Total time in clinic (min): 45 minutes    Assessment  Impairments: abnormal gait, abnormal muscle firing, abnormal or restricted ROM, activity intolerance, impaired physical strength, lacks appropriate home exercise program and pain with function  Symptom irritability: moderate    Assessment details: Risk Assessment and Prediction Tool results reviewed. Discussed DOS and patient's questions were answered. Mobility/ROM results objective findings. Strength results per above. Balance/Gait assessment (including Timed Up & Go) reviewed. Virtual Home Assessment was reviewed with patient. Home Preparation Checklist was reviewed with patient including identification of care partner and encouragement of single level set-up. Post-operative pain management expectations discussed. Post-operative gait training for level ground, stairs, and car transfers was performed. Patient demonstrated competence with immediate post-operative home exercise program.    25  Patient returns to physical therapy this date for her first post-op visit.  She presents ambulating with a rollator walker with limited weightbearing through the forefoot on the right lower extremity.  Gait improved with verbal cueing.  Patient presents with impairments as outlined above she could benefit from postop physical therapy to maximize functional potential and achieve goals as outlined in her previous visit.      25  Compared re-evaluation findings to initial evaluation findings and her functional testing has improved significantly. This is consistent withher FOTO and her subjective. She has met STGs and LTGs. I made one new  goal. Updated her HEP. Pt remains a candidate for skilled PT.   Understanding of Dx/Px/POC: good     Prognosis: good    Goals  STGs  1) Pt will be able to ambulate with the least restrictive device to return to PLOF - MET   2) Pt will increase AROM at the R hip to be able to complete ADLs/IADLs - MET   3) Pt will demonstrate 100% competency of posterior hip precautions to ensure appropriate healing and prevent dislocation - MET  4) Pt will be able to ambulate stairs reciprocally - METS       LTGs  1) Pt will improve TUG score from 13 seconds to less than 12 for safe community ambulation - MET   2) Pt will demonstrate 100% competency of HEP to be appropriate for D/C from therapy after goals have been met, pt is functioning at PLOF, and/or the pt displays significant improvement.  -MET   3) Pt will improve 5x sit to stand by 3-4 seconds to demonstrate increased LE strength - MET       NEW GOALS:   Pt will be able to complete 10 consecutive SLRs in supine to demonstrate hip flexor strength        Plan  Patient would benefit from: skilled physical therapy  Referral necessary: No  Planned modality interventions: electrical stimulation/Russian stimulation    Planned therapy interventions: joint mobilization, ADL retraining, ADL training, manual therapy, balance, motor coordination training, strengthening, stretching, therapeutic exercise, home exercise program, therapeutic activities, therapeutic training, graded motor, graded exercise, graded activity, gait training, functional ROM exercises, flexibility, transfer training and IADL retraining    Frequency: 2-3x week  Duration in weeks: 14  Plan of Care beginning date: 5/5/2025  Plan of Care expiration date: 8/3/2025  Treatment plan discussed with: patient  Plan details: Pt was in agreement that they will be treated by myself in combination with a PTA. Further, informed that we work as a team, the PTAs follow the POC created by the PT, and I trust them to make safe and  reasonable changes when appropriate under their scope of practice.     Pt was educated on the nature of their dx and the benefits of completing physical therapy. Additionally, pt is encouraged to ask questions regarding their dx and POC.           Subjective Evaluation    History of Present Illness  Mechanism of injury: Pt is having a R THR on 25  Pt is doing good with PT. She still has tenderness while lying on that side and/or touching it. Lastly, she is having difficulty with car transfers.           Recurrent probem    Quality of life: good    Patient Goals  Patient goals for therapy: decreased pain, improved balance, increased motion, increased strength, independence with ADLs/IADLs and return to sport/leisure activities    Pain  Current pain ratin  At best pain ratin  At worst pain ratin  Quality: dull ache and grinding  Relieving factors: medications  Aggravating factors: standing, walking, stair climbing and sitting  Progression: worsening    Social Support  Steps to enter house: yes  Stairs in house: yes   Lives in: multiple-level home    Employment status: not working  Hand dominance: right  Exercise history: lawn maintenance      Diagnostic Tests  X-ray: abnormal        Objective     Active Range of Motion   Left Hip   Flexion: WFL and with pain  Internal rotation (90/90): 5 degrees with pain    Right Hip   Normal active range of motion    Functional Assessment        Comments  25  5 x sit to stand 18 seconds no UE     TU seconds no AD    25  5 x sit to stand: 15 seconds no UE     TU seconds no AD         Flowsheet Rows      Flowsheet Row Most Recent Value   PT/OT G-Codes    Current Score 29   Projected Score 62               POC expires Unit limit Auth Expiration date PT/OT + Visit Limit?   25 BOMN  BOMN                           Visit/Unit Tracking  AUTH Status:  Date 5/5 6/9 6/13 6/18 6/20 6/24 6/27 7/1 7/3 7/9     No auth needed  Used 1  2 3 4 5 6 7 8 9  "10      Remaining   RE  EVAL              FOTO                     Precautions: R THR - posterior hip precautions  Pt presents following a total hip arthroplasty of the R/L, posterior approach. For the first four weeks patient is to avoid these motions as to prevent dislocation and ensure proper healing of the prosthesis:  - hip flexion past 90*  - hip internal rotation  - hip adduction     Discussed with the patient that specific surgeons maintain precautions up to a year at times. Most importantly educated them to avoid motions that would pair hip flexion, IR, and ADD together, increasing the risk of injury.        Manuals 6/9 6/13 6/18 6/20 6/24 6/27 7/1 7/3 7/9                                           [employer] HEP issued            Neuro Re-Ed             Cones taps        NV  2x10 ea                                                                                    Ther Ex             Ankle pumps 20x 30x 30x          Supine heel slides A  15x 20x 20x 20x 30x         Glute set 5\"/10x 5\"x10x 10\"x10  10\"x20 20x         Ball adductor set HL 3\"/15x 3\" 30x 3\"x30x  x30 30x   30x       Hip abd    Rtb x30  RTB 30x   GTB 30x       Quad set 5\"/15x 5\" 30x 10\"x10  10\"x20  10\"x20         LAQs 10x 30x 30x  2/30x  2# 30x 3# 30x  3# 30x                    Standing bent knee hip flexion 10x 20x Marching 20x  20x  20x 20x ea 2\" hold  March 2\"x30 March 2\" x30  #2     Standing hip ABD 10x 20x 20x ea X20  20x  20x  3x10  X30      Standing hip EXT 10x 20x 30x   20x  30x      Calf raises 15x 20x 20x 20x  30x  30x       Mini squats    2x10 20x   20x      Nu step   5' l2 5'  5' 5'  5'  L5 5' TM 1 mph 5'  TM 1.2 mph 5'    Standing slr    X10 2x10  2x10 ea  2x10 ea 2x10 ea  2x10 ea                               Ther Activity             Hip precautions ed PP            Dressing change PP    4\" 2x10 ea         Step ups       4\" x20  6\" 20x  6' 20x  Stairs x5      Sit to stands      3x5 standard  3x5 STS 3x5 STS  From chair to chair 3x5   "   Hurdles         X3 non reciprocal/lat  X3 non reciprocal/lat     Seated marching over jaqui         To simulate cartransfer x20  X20     Gait Training 5'            Lateral walking at bar  2 laps 5 laps  5 laps  5 laps   5 laps Sidestepping rtb knees 5 laps  Sidesteps/monsters 2x30 ft gtb     Uneven ground on foam mat w/ obstacles         x6     Modalities

## 2025-07-10 ENCOUNTER — OFFICE VISIT (OUTPATIENT)
Dept: PHYSICAL THERAPY | Age: 70
End: 2025-07-10
Attending: ORTHOPAEDIC SURGERY
Payer: MEDICARE

## 2025-07-10 DIAGNOSIS — Z96.641 S/P TOTAL RIGHT HIP ARTHROPLASTY: Primary | ICD-10-CM

## 2025-07-10 PROCEDURE — 97110 THERAPEUTIC EXERCISES: CPT

## 2025-07-10 NOTE — PROGRESS NOTES
Daily Note     Today's date: 7/10/2025  Patient name: Debbi Major  : 1955  MRN: [id number]  Referring provider: Terry Alvarado DO  Dx:   Encounter Diagnosis     ICD-10-CM    1. S/P total right hip arthroplasty  Z96.641           Start Time: 1111  Stop Time: 1200  Total time in clinic (min): 49 minutes    Subjective: Pt has no new complaints       Objective: See treatment diary below      Assessment: Added resisted walking forward and backward, progress walking on foam and progressed step ups. Continued current POC only by increasing resistance or repetitions as outlined below. Next visit add 3 way hip kicks at the tb column. Tolerated treatment well. Patient demonstrated fatigue post treatment      Plan: Continue per plan of care.        POC expires Unit limit Auth Expiration date PT/OT + Visit Limit?   25 BOMN  BOMN                           Visit/Unit Tracking  AUTH Status:  Date 5/5 6/9 6/13 6/18 6/20 6/24 6/27 7/1 7/3 7/9 7/10    No auth needed  Used 1  2 3 4 5 6 7 8 9 10 11     Remaining   RE  EVAL              FOTO                     Precautions: R THR - posterior hip precautions  Pt presents following a total hip arthroplasty of the R/L, posterior approach. For the first four weeks patient is to avoid these motions as to prevent dislocation and ensure proper healing of the prosthesis:  - hip flexion past 90*  - hip internal rotation  - hip adduction     Discussed with the patient that specific surgeons maintain precautions up to a year at times. Most importantly educated them to avoid motions that would pair hip flexion, IR, and ADD together, increasing the risk of injury.        Manuals 6/9 6/13 6/18 6/20 6/24 6/27 7/1 7/3 7/9 7/10                                          [employer] HEP issued            Neuro Re-Ed             Cones taps        NV  2x10 ea      clock         x10 X10 ea no cane                                                                     Ther Ex             Ankle  "pumps 20x 30x 30x          Supine heel slides A  15x 20x 20x 20x 30x         Glute set 5\"/10x 5\"x10x 10\"x10  10\"x20 20x         Ball adductor set HL 3\"/15x 3\" 30x 3\"x30x  x30 30x   30x       Hip abd    Rtb x30  RTB 30x   GTB 30x       Quad set 5\"/15x 5\" 30x 10\"x10  10\"x20  10\"x20         LAQs 10x 30x 30x  2/30x  2# 30x 3# 30x  3# 30x                    Standing bent knee hip flexion 10x 20x Marching 20x  20x  20x 20x ea 2\" hold  March 2\"x30 March 2\" x30      Standing hip ABD 10x 20x 20x ea X20  20x  20x  3x10  X30      Standing hip EXT 10x 20x 30x   20x  30x      Calf raises 15x 20x 20x 20x  30x  30x       Mini squats    2x10 20x   20x   20x    Nu step   5' l2 5'  5' 5'  5'  L5 5' TM 1 mph 5'  TM 1.2 mph 5' TM 1.2 mph 5'   Standing slr    X10 2x10  2x10 ea  2x10 ea 2x10 ea  2x10 ea  X20 ea                              Ther Activity             Hip precautions ed PP            Dressing change PP    4\" 2x10 ea         Step ups       4\" x20  6\" 20x  6' 20x  Stairs x5   4' + foam x20    Sit to stands      3x5 standard  3x5 STS 3x5 STS  From chair to chair 3x5  From chair to chair x10   Hurdles         X3 non reciprocal/lat  X3 non reciprocal/lat  X3 reciprocal + cog    Seated marching over jaqui         To simulate cartransfer x20  X20  X20 low table    3 way hip kick at tb column          nv   Gait Training 5'            Lateral walking at bar  2 laps 5 laps  5 laps  5 laps   5 laps Sidestepping rtb knees 5 laps  Sidesteps/monsters 2x30 ft gtb  Sidesteps/monsters 2x30 ft gtb    Uneven ground on foam mat w/ obstacles         x6  Tandem border on foam    Resisted walking           X5 ea 12#    Modalities                                              "

## 2025-07-16 ENCOUNTER — OFFICE VISIT (OUTPATIENT)
Dept: PHYSICAL THERAPY | Age: 70
End: 2025-07-16
Attending: ORTHOPAEDIC SURGERY
Payer: MEDICARE

## 2025-07-16 DIAGNOSIS — M16.11 PRIMARY OSTEOARTHRITIS OF RIGHT HIP: Primary | ICD-10-CM

## 2025-07-16 DIAGNOSIS — Z96.641 S/P TOTAL RIGHT HIP ARTHROPLASTY: ICD-10-CM

## 2025-07-16 PROCEDURE — 97110 THERAPEUTIC EXERCISES: CPT

## 2025-07-16 NOTE — PROGRESS NOTES
Daily Note     Today's date: 2025  Patient name: Debbi Major  : 1955  MRN: [id number]  Referring provider: Terry Alvarado DO  Dx:   Encounter Diagnosis     ICD-10-CM    1. Primary osteoarthritis of right hip  M16.11       2. S/P total right hip arthroplasty  Z96.641           Start Time: 1000  Stop Time: 1030  Total time in clinic (min): 30 minutes    Subjective: Patient reported no change in symptoms since her last visit.       Objective: See treatment diary below      Assessment: Patient would benefit from continued PT. She had performed exercises on wall theraband machine today and was able to perform all movements excluding abduction without a stabilization device. Close supervision was required for tandem walks. Patient did not enjoy three way kicks.       Plan: Continue per plan of care.        POC expires Unit limit Auth Expiration date PT/OT + Visit Limit?   25 BOMN  BOMN                           Visit/Unit Tracking  AUTH Status:  Date 5/5 6/9 6/13 6/18 6/20 6/24 6/27 7/1 7/3 7/9 7/10 7/16   No auth needed  Used 1  2 3 4 5 6 7 8 9 10 11 12    Remaining   RE  EVAL              FOTO                     Precautions: R THR - posterior hip precautions  Pt presents following a total hip arthroplasty of the R/L, posterior approach. For the first four weeks patient is to avoid these motions as to prevent dislocation and ensure proper healing of the prosthesis:  - hip flexion past 90*  - hip internal rotation  - hip adduction     Discussed with the patient that specific surgeons maintain precautions up to a year at times. Most importantly educated them to avoid motions that would pair hip flexion, IR, and ADD together, increasing the risk of injury.        Manuals 7/16 6/13 6/18 6/20 6/24 6/27 7/1 7/3 7/9 7/10                                          [facility]             Neuro Re-Ed             Cones taps        NV  2x10 ea      clock         x10 X10 ea no cane                             "                                         Ther Ex             Ankle pumps  30x 30x          Supine heel slides  20x 20x 20x 30x         Glute set  5\"x10x 10\"x10  10\"x20 20x         Ball adductor set HL  3\" 30x 3\"x30x  x30 30x   30x       Hip abd    Rtb x30  RTB 30x   GTB 30x       Quad set  5\" 30x 10\"x10  10\"x20  10\"x20         LAQs  30x 30x  2/30x  2# 30x 3# 30x  3# 30x                    Standing bent knee hip flexion  20x Marching 20x  20x  20x 20x ea 2\" hold  March 2\"x30 March 2\" x30      Standing hip ABD  20x 20x ea X20  20x  20x  3x10  X30      Standing hip EXT  20x 30x   20x  30x      Calf raises  20x 20x 20x  30x  30x       Mini squats 20x   2x10 20x   20x   20x    Nu step  TM 1.2 mph 5' 5' l2 5'  5' 5'  5'  L5 5' TM 1 mph 5'  TM 1.2 mph 5' TM 1.2 mph 5'   Standing slr X20 ea   X10 2x10  2x10 ea  2x10 ea 2x10 ea  2x10 ea  X20 ea                              Ther Activity             Hip precautions ed             Dressing change     4\" 2x10 ea         Step ups  4' + foam x20      4\" x20  6\" 20x  6' 20x  Stairs x5   4' + foam x20    Sit to stands From chair to chair x10     3x5 standard  3x5 STS 3x5 STS  From chair to chair 3x5  From chair to chair x10   Hurdles  X3 reciprocal + cog       X3 non reciprocal/lat  X3 non reciprocal/lat  X3 reciprocal + cog    Seated marching over jaqui  X20 low table        To simulate cartransfer x20  X20  X20 low table    3 way hip kick at tb column 2x20 D/c        nv   Gait Training             Lateral walking at bar Side steps/ monster walks 2x30 ft gtb 2 laps 5 laps  5 laps  5 laps   5 laps Sidestepping rtb knees 5 laps  Sidesteps/monsters 2x30 ft gtb  Sidesteps/monsters 2x30 ft gtb    Uneven ground on foam mat w/ obstacles  Tandem boarder big foam        x6  Tandem border on foam    Resisted walking  X5 ea 12#          X5 ea 12#    Modalities                                                "

## 2025-07-16 NOTE — PROGRESS NOTES
"Daily Note     Today's date: 2025  Patient name: Debbi Major  : 1955  MRN: [id number]  Referring provider: Terry Alvarado DO  Dx: No diagnosis found.               Subjective: ***      Objective: See treatment diary below      Assessment: Tolerated treatment {Tolerated treatment :}. Patient {assessment:[id number]}      Plan: {PLAN:[id number]}       POC expires Unit limit Auth Expiration date PT/OT + Visit Limit?   25 BOMN  BOMN                           Visit/Unit Tracking  AUTH Status:  Date 5/5 6/9 6/13 6/18 6/20 6/24 6/27 7/1 7/3 7/9 7/10 7/16   No auth needed  Used 1  2 3 4 5 6 7 8 9 10 11 12    Remaining   RE  EVAL              FOTO                     Precautions: R THR - posterior hip precautions  Pt presents following a total hip arthroplasty of the R/L, posterior approach. For the first four weeks patient is to avoid these motions as to prevent dislocation and ensure proper healing of the prosthesis:  - hip flexion past 90*  - hip internal rotation  - hip adduction     Discussed with the patient that specific surgeons maintain precautions up to a year at times. Most importantly educated them to avoid motions that would pair hip flexion, IR, and ADD together, increasing the risk of injury.        Manuals 7/16 6/13 6/18 6/20 6/24 6/27 7/1 7/3 7/9 7/10                                          [facility]             Neuro Re-Ed             Cones taps        NV  2x10 ea      clock         x10 X10 ea no cane                                                                     Ther Ex             Ankle pumps  30x 30x          Supine heel slides  20x 20x 20x 30x         Glute set  5\"x10x 10\"x10  10\"x20 20x         Ball adductor set HL  3\" 30x 3\"x30x  x30 30x   30x       Hip abd    Rtb x30  RTB 30x   GTB 30x       Quad set  5\" 30x 10\"x10  10\"x20  10\"x20         LAQs  30x 30x  2/30x  2# 30x 3# 30x  3# 30x                    Standing bent knee hip flexion  20x Marching 20x  20x  20x " "20x ea 2\" hold  March 2\"x30 March 2\" x30      Standing hip ABD  20x 20x ea X20  20x  20x  3x10  X30      Standing hip EXT  20x 30x   20x  30x      Calf raises  20x 20x 20x  30x  30x       Mini squats 20x   2x10 20x   20x   20x    Nu step  TM 1.2 mph 5' 5' l2 5'  5' 5'  5'  L5 5' TM 1 mph 5'  TM 1.2 mph 5' TM 1.2 mph 5'   Standing slr X20 ea   X10 2x10  2x10 ea  2x10 ea 2x10 ea  2x10 ea  X20 ea                              Ther Activity             Hip precautions ed             Dressing change     4\" 2x10 ea         Step ups  4' + foam x20      4\" x20  6\" 20x  6' 20x  Stairs x5   4' + foam x20    Sit to stands From chair to chair x10     3x5 standard  3x5 STS 3x5 STS  From chair to chair 3x5  From chair to chair x10   Hurdles  X3 reciprocal + cog       X3 non reciprocal/lat  X3 non reciprocal/lat  X3 reciprocal + cog    Seated marching over jaqui  X20 low table        To simulate cartransfer x20  X20  X20 low table    3 way hip kick at tb column 2x20 D/c        nv   Gait Training             Lateral walking at bar Side steps/ monster walks 2x30 ft gtb 2 laps 5 laps  5 laps  5 laps   5 laps Sidestepping rtb knees 5 laps  Sidesteps/monsters 2x30 ft gtb  Sidesteps/monsters 2x30 ft gtb    Uneven ground on foam mat w/ obstacles  Tandem boarder big foam        x6  Tandem border on foam    Resisted walking  X5 ea 12#          X5 ea 12#    Modalities                                                  "

## 2025-07-18 ENCOUNTER — OFFICE VISIT (OUTPATIENT)
Dept: PHYSICAL THERAPY | Age: 70
End: 2025-07-18
Attending: ORTHOPAEDIC SURGERY
Payer: MEDICARE

## 2025-07-18 DIAGNOSIS — M16.11 PRIMARY OSTEOARTHRITIS OF RIGHT HIP: Primary | ICD-10-CM

## 2025-07-18 DIAGNOSIS — Z96.641 S/P TOTAL RIGHT HIP ARTHROPLASTY: ICD-10-CM

## 2025-07-18 PROCEDURE — 97110 THERAPEUTIC EXERCISES: CPT

## 2025-07-18 NOTE — PROGRESS NOTES
"Daily Note     Today's date: 2025  Patient name: Debbi Major  : 1955  MRN: [id number]  Referring provider: Terry Alvarado DO  Dx: No diagnosis found.               Subjective: Patient reports tingling on surgical scar but reports that it is constant since the surgery.       Objective: See treatment diary below      Assessment: Patient would benefit from continued PT. Captain morgans were added to increase hip stabilization. Pegs into lower wall were added to increase patients performance of ADL's and simulate putting items in lower counters.       Plan: Continue per plan of care.        POC expires Unit limit Auth Expiration date PT/OT + Visit Limit?   25 BOMN  BOMN                           Visit/Unit Tracking  AUTH Status:  Date 5/5 6/9 6/13 6/18 6/20 6/24 6/27 7/1 7/3 7/9 7/10 7/16   No auth needed  Used 1  2 3 4 5 6 7 8 9 10 11 12    Remaining   RE  EVAL              FOTO                     Precautions: R THR - posterior hip precautions  Pt presents following a total hip arthroplasty of the R/L, posterior approach. For the first four weeks patient is to avoid these motions as to prevent dislocation and ensure proper healing of the prosthesis:  - hip flexion past 90*  - hip internal rotation  - hip adduction     Discussed with the patient that specific surgeons maintain precautions up to a year at times. Most importantly educated them to avoid motions that would pair hip flexion, IR, and ADD together, increasing the risk of injury.        Manuals 7/16 7/18 6/18 6/20 6/24 6/27 7/1 7/3 7/9 7/10                                          [facility]             Neuro Re-Ed             Cones taps        NV  2x10 ea      clock         x10 X10 ea no cane                                                                     Ther Ex             Ankle pumps   30x          Supine heel slides   20x 20x 30x         Glute set   10\"x10  10\"x20 20x         Ball adductor set HL   3\"x30x  x30 30x   30x     " "  Hip abd    Rtb x30  RTB 30x   GTB 30x       Quad set   10\"x10  10\"x20  10\"x20         LAQs   30x  2/30x  2# 30x 3# 30x  3# 30x                    Standing bent knee hip flexion   Marching 20x  20x  20x 20x ea 2\" hold  March 2\"x30 March 2\" x30      Standing hip ABD   20x ea X20  20x  20x  3x10  X30      Standing hip EXT   30x   20x  30x      Calf raises   20x 20x  30x  30x       Mini squats 20x 20x  2x10 20x   20x   20x    Nu step  TM 1.2 mph 5' TM 1.2 mph 5' 5'  5' 5'  5'  L5 5' TM 1 mph 5'  TM 1.2 mph 5' TM 1.2 mph 5'   Standing slr X20 ea X20 ea  X10 2x10  2x10 ea  2x10 ea 2x10 ea  2x10 ea  X20 ea                              Ther Activity             Hip precautions ed             Dressing change     4\" 2x10 ea         Step ups  4' + foam x20  4' + foam x20    4\" x20  6\" 20x  6' 20x  Stairs x5   4' + foam x20    Sit to stands From chair to chair x10 From chair to chair 10x    3x5 standard  3x5 STS 3x5 STS  From chair to chair 3x5  From chair to chair x10   Hurdles  X3 reciprocal + cog X3 reciprocal + cog      X3 non reciprocal/lat  X3 non reciprocal/lat  X3 reciprocal + cog    Seated marching over jaqui  X20 low table  X20 low table      To simulate cartransfer x20  X20  X20 low table    3 way hip kick at tb column 2x20 D/c        nv   Gait Training             Lateral walking at bar Side steps/ monster walks 2x30 ft gtb Side steps/monster walks 2x30 5 laps  5 laps  5 laps   5 laps Sidestepping rtb knees 5 laps  Sidesteps/monsters 2x30 ft gtb  Sidesteps/monsters 2x30 ft gtb    Uneven ground on foam mat w/ obstacles  Tandem boarder big foam  Tandem on boarder big foam      x6  Tandem border on foam    Resisted walking  X5 ea 12#  X5 ea 15#        X5 ea 12#    Bending down with pegs   2x           Captain morgans  2x10           Modalities                                                  "

## 2025-07-23 ENCOUNTER — OFFICE VISIT (OUTPATIENT)
Dept: PHYSICAL THERAPY | Age: 70
End: 2025-07-23
Attending: ORTHOPAEDIC SURGERY
Payer: MEDICARE

## 2025-07-23 DIAGNOSIS — Z96.641 S/P TOTAL RIGHT HIP ARTHROPLASTY: Primary | ICD-10-CM

## 2025-07-23 PROCEDURE — 97110 THERAPEUTIC EXERCISES: CPT

## 2025-07-23 NOTE — PROGRESS NOTES
"Daily Note     Today's date: 2025  Patient name: Debbi Major  : 1955  MRN: [id number]  Referring provider: Terry Alvarado DO  Dx:   Encounter Diagnosis     ICD-10-CM    1. S/P total right hip arthroplasty  Z96.641           Start Time: 1015  Stop Time: 1100  Total time in clinic (min): 45 minutes    Subjective: Pt presents today with no new complaints. She has been able to sleep through the night. She is still in agreement with discharge.       Objective: See treatment diary below      Assessment: Did not change POC as today is her last visit. Tolerated treatment well. Patient demonstrated fatigue post treatment      Plan: Today is discharge        POC expires Unit limit Auth Expiration date PT/OT + Visit Limit?   25 BOMN  BOMN                           Visit/Unit Tracking  AUTH Status:  Date 7/23 6/9 6/13 6/18 6/20 6/24 6/27 7/1 7/3 7/9 7/10 7/16   No auth needed  Used 13 2 3 4 5 6 7 8 9 10 11 12    Remaining   RE  EVAL              FOTO                     Precautions: R THR - posterior hip precautions  Pt presents following a total hip arthroplasty of the R/L, posterior approach. For the first four weeks patient is to avoid these motions as to prevent dislocation and ensure proper healing of the prosthesis:  - hip flexion past 90*  - hip internal rotation  - hip adduction     Discussed with the patient that specific surgeons maintain precautions up to a year at times. Most importantly educated them to avoid motions that would pair hip flexion, IR, and ADD together, increasing the risk of injury.        Manuals 7/16 7/18 7/23 6/20 6/24 6/27 7/1 7/3 7/9 7/10                                          [facility]             Neuro Re-Ed             Cones taps        NV  2x10 ea      clock         x10 X10 ea no cane                                                                     Ther Ex             Ankle pumps             Supine heel slides    20x 30x         Glute set    10\"x20 20x       " "  Ball adductor set HL    x30 30x   30x       Hip abd    Rtb x30  RTB 30x   GTB 30x       Quad set    10\"x20  10\"x20         LAQs    2/30x  2# 30x 3# 30x  3# 30x                    Standing bent knee hip flexion    20x  20x 20x ea 2\" hold  March 2\"x30 March 2\" x30      Standing hip ABD    X20  20x  20x  3x10  X30      Standing hip EXT     20x  30x      Calf raises    20x  30x  30x       Mini squats 20x 20x 20x  2x10 20x   20x   20x    Nu step  TM 1.2 mph 5' TM 1.2 mph 5' TM 8' mph 1.5'  5' 5'  5'  L5 5' TM 1 mph 5'  TM 1.2 mph 5' TM 1.2 mph 5'   Standing slr X20 ea X20 ea X20 ea  X10 2x10  2x10 ea  2x10 ea 2x10 ea  2x10 ea  X20 ea                              Ther Activity             Hip precautions ed             Dressing change     4\" 2x10 ea         Step ups  4' + foam x20  4' + foam x20 6 + foam x20    4\" x20  6\" 20x  6' 20x  Stairs x5   4' + foam x20    Sit to stands From chair to chair x10 From chair to chair 10x    3x5 standard  3x5 STS 3x5 STS  From chair to chair 3x5  From chair to chair x10   Hurdles  X3 reciprocal + cog X3 reciprocal + cog      X3 non reciprocal/lat  X3 non reciprocal/lat  X3 reciprocal + cog    Seated marching over jaqui  X20 low table  X20 low table      To simulate cartransfer x20  X20  X20 low table    3 way hip kick at tb column 2x20 D/c        nv   Gait Training             Lateral walking at bar Side steps/ monster walks 2x30 ft gtb Side steps/monster walks 2x30 100 ft ea gtb calves  5 laps  5 laps   5 laps Sidestepping rtb knees 5 laps  Sidesteps/monsters 2x30 ft gtb  Sidesteps/monsters 2x30 ft gtb    Uneven ground on foam mat w/ obstacles  Tandem boarder big foam  Tandem on boarder big foam Tandem on foam bar      x6  Tandem border on foam    Resisted walking  X5 ea 12#  X5 ea 15# X5 ea #10        X5 ea 12#    Bending down with pegs   2x 2x foam           Captain holbrooks  2x10           Modalities                                                    "

## 2025-07-25 ENCOUNTER — OFFICE VISIT (OUTPATIENT)
Dept: OBGYN CLINIC | Facility: CLINIC | Age: 70
End: 2025-07-25

## 2025-07-25 VITALS — WEIGHT: 143 LBS | HEIGHT: 63 IN | BODY MASS INDEX: 25.34 KG/M2

## 2025-07-25 DIAGNOSIS — Z96.641 S/P TOTAL RIGHT HIP ARTHROPLASTY: Primary | ICD-10-CM

## 2025-07-25 PROCEDURE — 99024 POSTOP FOLLOW-UP VISIT: CPT | Performed by: ORTHOPAEDIC SURGERY

## 2025-07-25 RX ORDER — CEPHALEXIN 500 MG/1
2000 CAPSULE ORAL ONCE
Qty: 4 CAPSULE | Refills: 3 | Status: SHIPPED | OUTPATIENT
Start: 2025-07-25 | End: 2025-07-25

## 2025-07-25 NOTE — PROGRESS NOTES
"Assessment & Plan  S/P total right hip arthroplasty    Orders:    cephalexin (KEFLEX) 500 mg capsule; Take 4 capsules (2,000 mg total) by mouth 1 (one) time for 1 dose Take 4 tablets 1 hour prior to dental procedures  Reviewed physical exam with patient at time of visit. The patient is 6 week s/p Right total hip arthroplasty. She continues to progress as expected post-operatively. Continue formal physical therapy, per protocol. Continue weightbearing activities, as tolerated. Continue hip precautions for an additional 6 weeks. The patient was provided She will follow-up in 6 weeks for re-evaluation, with repeat XR of her Right hip. The patient expresses understanding and is in agreement with today's treatment plan.    The patient is doing quite well in regards to her right total hip replacement.  Strength and motion intact.  She stopped therapy this week.  Continue home exercise program.  Follow-up in 6 weeks for reevaluation with new x-rays right hip-2 views      Subjective:   Patient ID: Debbi Major  1955     HPI  Patient is a 70 y.o. female who presents for post-operative evaluation of her right hip. The patient is approximately 7 weeks s/p right MELANIA from 6/4/2025. The patient is doing very well since surgery. She is no longer using assistive devices to ambulate. She denies groin or hip pain. The patient is happy with surgical outcome.      The following portions of the patient's history were reviewed and updated as appropriate:  Past medical history, past surgical history, Family history, social history, current medications and allergies    Past Medical History[1]    Past Surgical History[2]    Family History[3]    Social History[4]    Current Medications[5]    Allergies   Allergen Reactions    Sulfa Antibiotics Hives       Review of Systems     Objective:  Ht 5' 3\" (1.6 m)   Wt 64.9 kg (143 lb)   BMI 25.33 kg/m²     Ortho Exam  right Hip -  Patient presents with no anatomical deformity  Ambulates with " steady gait pattern  Uses No assistive devices  Incision is well healed with no signs of drainage or infection  No tenderness upon palpation  Smooth passive circumduction   Strength: 5/5 throughout  Knee flexor and extensor mechanism intact  Ankle DF and PF mechanism intact  - Leg Length Discrepancy   2+ TP and DP pulses with brisk capillary refill to the toes  Sural, saphenous, tibial, superficial and deep peroneal motor and sensory distribution intact  Sensation to light touch intact distally      Physical Exam  HENT:      Head: Normocephalic and atraumatic.      Nose: Nose normal.     Eyes:      Conjunctiva/sclera: Conjunctivae normal.       Cardiovascular:      Rate and Rhythm: Normal rate.   Pulmonary:      Effort: Pulmonary effort is normal.     Musculoskeletal:      Cervical back: Neck supple.     Skin:     General: Skin is warm and dry.      Capillary Refill: Capillary refill takes less than 2 seconds.     Neurological:      Mental Status: She is alert and oriented to person, place, and time.     Psychiatric:         Mood and Affect: Mood normal.         Behavior: Behavior normal.          Diagnostic Test Review:  No new imaging at time of visit.     Procedures   None performed.     Scribe Attestation      I,:  Trice Bates am acting as a scribe while in the presence of the attending physician.:       I,:  Terry Alvarado DO personally performed the services described in this documentation    as scribed in my presence.:                   [1]   Past Medical History:  Diagnosis Date    Colon polyp     Depression     Hyperlipidemia     last assessed - 2016    Hyperparathyroidism (HCC) 2018    Wears glasses    [2]   Past Surgical History:  Procedure Laterality Date     SECTION      COLONOSCOPY      [facility] ARTHRP ACETBLR/PROX FEM PROSTC AGRFT/ALGRFT Right 2025    Procedure: RIGHT TOTAL HIP ARTHROPLASTY;  Surgeon: Terry Alvarado DO;  Location: [facility];  Service: Orthopedics    [facility]  PARATHYROIDECTOMY/EXPLORATION PARATHYROIDS Right 01/03/2023    Procedure: MINIMALLY INVASIVE RIGHT PARATHYROIDECTOMY;  Surgeon: Davon Garrido MD;  Location: [facility];  Service: Surgical Oncology    US GUIDED THYROID BIOPSY  06/09/2022   [3]   Family History  Problem Relation Name Age of Onset    Cancer Mother      Cancer Father      Cancer Sister      Breast cancer Sister      No Known Problems Sister      No Known Problems Sister      No Known Problems Sister      No Known Problems Sister      No Known Problems Sister      No Known Problems Sister      No Known Problems Maternal Grandmother      No Known Problems Paternal Grandmother      No Known Problems Maternal Aunt      No Known Problems Maternal Aunt      No Known Problems Maternal Aunt      No Known Problems Paternal Aunt      No Known Problems Paternal Aunt     [4]   Social History  Socioeconomic History    Marital status: /Civil Union   Tobacco Use    Smoking status: Every Day     Current packs/day: 0.50     Average packs/day: 0.5 packs/day for 30.0 years (15.0 ttl pk-yrs)     Types: Cigarettes    Smokeless tobacco: Never   Vaping Use    Vaping status: Never Used   Substance and Sexual Activity    Alcohol use: Not Currently    Drug use: No   Social History Narrative    Stress at home     Social Drivers of Health     Financial Resource Strain: Low Risk  (10/4/2023)    Overall Financial Resource Strain (CARDIA)     Difficulty of Paying Living Expenses: Not very hard   Food Insecurity: Patient Declined (6/4/2025)    Nursing - Inadequate Food Risk Classification     Worried About Running Out of Food in the Last Year: Patient declined     Ran Out of Food in the Last Year: Patient declined     Ran Out of Food in the Last Year: Never true   Transportation Needs: No Transportation Needs (6/4/2025)    Nursing - Transportation Risk Classification     Lack of Transportation: No   Intimate Partner Violence: Unknown (6/4/2025)    Nursing IPS     Physically  Hurt by Someone: No     Hurt or Threatened by Someone: No   Housing Stability: Unknown (6/4/2025)    Nursing: Inadequate Housing Risk Classification     Unable to Pay for Housing in the Last Year: No     Has Housing: No   [5]   Current Outpatient Medications:     alendronate (FOSAMAX) 70 mg tablet, TAKE 1 TABLET BY MOUTH EVERY 7 DAYS, Disp: 12 tablet, Rfl: 1    ascorbic acid (VITAMIN C) 250 mg tablet, Take 2 tablets (500 mg total) by mouth daily, Disp: 60 tablet, Rfl: 0    aspirin 325 mg tablet, Take 325 mg by mouth daily, Disp: , Rfl:     atorvastatin (LIPITOR) 20 mg tablet, TAKE ONE TABLET BY MOUTH DAILY, Disp: 30 tablet, Rfl: 5    cephalexin (KEFLEX) 500 mg capsule, Take 4 capsules (2,000 mg total) by mouth 1 (one) time for 1 dose Take 4 tablets 1 hour prior to dental procedures, Disp: 4 capsule, Rfl: 3    Cholecalciferol 50 MCG (2000 UT) CAPS, Take by mouth in the morning., Disp: , Rfl:     ferrous sulfate 324 (65 Fe) mg, Take 1 tablet (324 mg total) by mouth 2 (two) times a day before meals, Disp: 60 tablet, Rfl: 0    fluticasone (FLONASE) 50 mcg/act nasal spray, 2 sprays into each nostril if needed, Disp: , Rfl:     folic acid (FOLVITE) 400 mcg tablet, Take 1 tablet (400 mcg total) by mouth daily, Disp: 30 tablet, Rfl: 0    LORazepam (Ativan) 0.5 mg tablet, Take 1 tablet (0.5 mg total) by mouth every 8 (eight) hours as needed for anxiety, Disp: 15 tablet, Rfl: 0    Multiple Vitamin (multivitamin) tablet, Take 1 tablet by mouth daily, Disp: 30 tablet, Rfl: 0    timolol (TIMOPTIC) 0.5 % ophthalmic solution, Administer 1 drop to both eyes in the morning and 1 drop in the evening., Disp: , Rfl:     Wheat Dextrin (BENEFIBER PO), Take by mouth if needed, Disp: , Rfl:     docusate sodium (COLACE) 100 mg capsule, Take 1 capsule (100 mg total) by mouth 2 (two) times a day (Patient not taking: Reported on 7/25/2025), Disp: 60 capsule, Rfl: 0

## (undated) DEVICE — GLOVE INDICATOR PI UNDERGLOVE SZ 7.5 BLUE

## (undated) DEVICE — DECANTER: Brand: UNBRANDED

## (undated) DEVICE — 3M™ STERI-DRAPE™ U-DRAPE 1015: Brand: STERI-DRAPE™

## (undated) DEVICE — STOCKINETTE,IMPERVIOUS,12X48,STERILE: Brand: MEDLINE

## (undated) DEVICE — MINOR PROCEDURE DRAPE: Brand: CONVERTORS

## (undated) DEVICE — GLOVE SRG BIOGEL 7.5

## (undated) DEVICE — ABDOMINAL PAD: Brand: DERMACEA

## (undated) DEVICE — DISPOSABLE OR TOWEL: Brand: CARDINAL HEALTH

## (undated) DEVICE — CLIP ON PATIENT PROTECTIVE PADS FOR USE WITH THE ULP (6 PER CASE): Brand: CLIP-ON™ PATIENT PROTECTIVE PADS

## (undated) DEVICE — PLUMEPEN PRO 10FT

## (undated) DEVICE — INTENDED FOR TISSUE SEPARATION, AND OTHER PROCEDURES THAT REQUIRE A SHARP SURGICAL BLADE TO PUNCTURE OR CUT.: Brand: BARD-PARKER ® CARBON RIB-BACK BLADES

## (undated) DEVICE — OCCLUSIVE GAUZE STRIP,3% BISMUTH TRIBROMOPHENATE IN PETROLATUM BLEND: Brand: XEROFORM

## (undated) DEVICE — GAUZE SPONGES,USP TYPE VII GAUZE, 12 PLY: Brand: CURITY

## (undated) DEVICE — PREP SURGICAL PURPREP 26ML

## (undated) DEVICE — ASTOUND FABRIC REINFORCED SURGICAL GOWN: Brand: CONVERTORS

## (undated) DEVICE — SLIM BODY SKIN STAPLER: Brand: APPOSE ULC

## (undated) DEVICE — GLOVE INDICATOR PI UNDERGLOVE SZ 8 BLUE

## (undated) DEVICE — ABDUCTION PILLOW FOAM POSITIONER: Brand: CARDINAL HEALTH

## (undated) DEVICE — SURGICEL 4 X 8

## (undated) DEVICE — NEPTUNE E-SEP SMOKE EVACUATION PENCIL, COATED, 70MM BLADE, PUSH BUTTON SWITCH: Brand: NEPTUNE E-SEP

## (undated) DEVICE — GARMENT,MEDLINE,DVT,INT,CALF,FOAM,MED: Brand: MEDLINE

## (undated) DEVICE — 3M™ STERI-STRIP™ REINFORCED ADHESIVE SKIN CLOSURES, R1547, 1/2 IN X 4 IN (12 MM X 100 MM), 6 STRIPS/ENVELOPE: Brand: 3M™ STERI-STRIP™

## (undated) DEVICE — SUT VICRYL 4-0 PS-2 27 IN J426H

## (undated) DEVICE — SUT VICRYL 2-0 CP-1 27 IN J266H

## (undated) DEVICE — SUT MONOCRYL 5-0 P-3 18 IN Y493G

## (undated) DEVICE — WET SKIN PREP TRAY: Brand: MEDLINE INDUSTRIES, INC.

## (undated) DEVICE — SUT VICRYL 3-0 SH 27 IN J416H

## (undated) DEVICE — GLOVE INDICATOR PI UNDERGLOVE SZ 7 BLUE

## (undated) DEVICE — Device

## (undated) DEVICE — PEEL-AWAY HOOD: Brand: FLYTE, SURGICOOL

## (undated) DEVICE — PACK UNIVERSAL NECK

## (undated) DEVICE — GAUZE SPONGES,16 PLY: Brand: CURITY

## (undated) DEVICE — DRAPE SURGIKIT SADDLE BAG

## (undated) DEVICE — 3000CC GUARDIAN II: Brand: GUARDIAN

## (undated) DEVICE — CAPIT HIP COP -CMNT/POR-ACTIS

## (undated) DEVICE — GLOVE SRG BIOGEL 8

## (undated) DEVICE — COBAN 6 IN STERILE

## (undated) DEVICE — 3M™ MICROFOAM™ TAPE 1528-4: Brand: 3M™ MICROFOAM™

## (undated) DEVICE — GLOVE SRG BIOGEL 7

## (undated) DEVICE — HOOD: Brand: FLYTE, SURGICOOL

## (undated) DEVICE — SUT VICRYL 1 CP 27 IN J196H

## (undated) DEVICE — FRAZIER SUCTION INSTRUMENT 18 FR W/OBTURATOR, NO CONTROL VENT: Brand: FRAZIER

## (undated) DEVICE — MEDI-VAC YANK SUCT HNDL W/TPRD BULBOUS TIP: Brand: CARDINAL HEALTH

## (undated) DEVICE — LIGACLIP MCA MULTIPLE CLIP APPLIERS, 20 SMALL CLIPS: Brand: LIGACLIP

## (undated) DEVICE — BLADE OSC STD/WIDE 0.89MM THCK

## (undated) DEVICE — 4-PORT MANIFOLD: Brand: NEPTUNE 2

## (undated) DEVICE — BAG WOUND LAVAGE 1L BIASURGE ADV

## (undated) DEVICE — BETHLEHEM TOTAL HIP, KIT: Brand: CARDINAL HEALTH

## (undated) DEVICE — 3M™ STERI-STRIP™ COMPOUND BENZOIN TINCTURE 40 BAGS/CARTON 4 CARTONS/CASE C1544: Brand: 3M™ STERI-STRIP™

## (undated) DEVICE — SUT VICRYL 0 CP-1 27 IN J267H

## (undated) DEVICE — CAPIT UPCHRG GRIPTION MULTIHOLE

## (undated) DEVICE — PULSAVAC TIP INTRAMED BRUSH

## (undated) DEVICE — TUBING SUCTION 5MM X 12 FT